# Patient Record
Sex: FEMALE | Race: WHITE | Employment: OTHER | URBAN - METROPOLITAN AREA
[De-identification: names, ages, dates, MRNs, and addresses within clinical notes are randomized per-mention and may not be internally consistent; named-entity substitution may affect disease eponyms.]

---

## 2017-01-24 ENCOUNTER — OFFICE VISIT (OUTPATIENT)
Dept: FAMILY MEDICINE CLINIC | Age: 77
End: 2017-01-24

## 2017-01-24 ENCOUNTER — TELEPHONE (OUTPATIENT)
Dept: FAMILY MEDICINE CLINIC | Age: 77
End: 2017-01-24

## 2017-01-24 ENCOUNTER — HOSPITAL ENCOUNTER (OUTPATIENT)
Dept: GENERAL RADIOLOGY | Age: 77
Discharge: HOME | End: 2017-01-24
Payer: MEDICARE

## 2017-01-24 ENCOUNTER — HOSPITAL ENCOUNTER (OUTPATIENT)
Age: 77
Discharge: HOME | End: 2017-01-24
Payer: MEDICARE

## 2017-01-24 VITALS
RESPIRATION RATE: 18 BRPM | TEMPERATURE: 97.8 F | OXYGEN SATURATION: 94 % | DIASTOLIC BLOOD PRESSURE: 80 MMHG | HEART RATE: 75 BPM | SYSTOLIC BLOOD PRESSURE: 140 MMHG

## 2017-01-24 DIAGNOSIS — S99.911A RIGHT ANKLE INJURY, INITIAL ENCOUNTER: Primary | ICD-10-CM

## 2017-01-24 DIAGNOSIS — S99.911A RIGHT ANKLE INJURY, INITIAL ENCOUNTER: ICD-10-CM
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
## 2017-01-24 PROCEDURE — 1090F PRES/ABSN URINE INCON ASSESS: CPT | Performed by: NURSE PRACTITIONER

## 2017-01-24 PROCEDURE — G8484 FLU IMMUNIZE NO ADMIN: HCPCS | Performed by: NURSE PRACTITIONER

## 2017-01-24 PROCEDURE — 1036F TOBACCO NON-USER: CPT | Performed by: NURSE PRACTITIONER

## 2017-01-24 PROCEDURE — 73610 X-RAY EXAM OF ANKLE: CPT

## 2017-01-24 PROCEDURE — 1123F ACP DISCUSS/DSCN MKR DOCD: CPT | Performed by: NURSE PRACTITIONER

## 2017-01-24 PROCEDURE — 3014F SCREEN MAMMO DOC REV: CPT | Performed by: NURSE PRACTITIONER

## 2017-01-24 PROCEDURE — 4040F PNEUMOC VAC/ADMIN/RCVD: CPT | Performed by: NURSE PRACTITIONER

## 2017-01-24 PROCEDURE — G8419 CALC BMI OUT NRM PARAM NOF/U: HCPCS | Performed by: NURSE PRACTITIONER

## 2017-01-24 PROCEDURE — G8427 DOCREV CUR MEDS BY ELIG CLIN: HCPCS | Performed by: NURSE PRACTITIONER

## 2017-01-24 PROCEDURE — G8400 PT W/DXA NO RESULTS DOC: HCPCS | Performed by: NURSE PRACTITIONER

## 2017-01-24 PROCEDURE — 99213 OFFICE O/P EST LOW 20 MIN: CPT | Performed by: NURSE PRACTITIONER

## 2017-01-24 PROCEDURE — 3017F COLORECTAL CA SCREEN DOC REV: CPT | Performed by: NURSE PRACTITIONER

## 2017-01-24 ASSESSMENT — ENCOUNTER SYMPTOMS
VOMITING: 0
VISUAL CHANGE: 0
NAUSEA: 0
COUGH: 0
BOWEL INCONTINENCE: 0
ABDOMINAL PAIN: 0

## 2017-02-06 ENCOUNTER — HOSPITAL ENCOUNTER (OUTPATIENT)
Dept: LAB | Age: 77
Discharge: HOME | End: 2017-02-06
Payer: MEDICARE

## 2017-02-06 DIAGNOSIS — E78.5 DYSLIPIDEMIA: ICD-10-CM

## 2017-02-06 DIAGNOSIS — I10 ESSENTIAL HYPERTENSION: ICD-10-CM

## 2017-02-06 LAB
ALBUMIN SERPL-MCNC: 4.4 G/DL (ref 3.9–4.9)
ALP BLD-CCNC: 88 U/L (ref 40–130)
ALT SERPL-CCNC: 16 U/L (ref 0–33)
ANION GAP SERPL CALCULATED.3IONS-SCNC: 18 MEQ/L (ref 7–13)
AST SERPL-CCNC: 14 U/L (ref 0–35)
BASOPHILS ABSOLUTE: 0.1 K/UL (ref 0–0.2)
BASOPHILS RELATIVE PERCENT: 0.5 %
BILIRUB SERPL-MCNC: 0.4 MG/DL (ref 0–1.2)
BUN BLDV-MCNC: 16 MG/DL (ref 8–23)
CALCIUM SERPL-MCNC: 9.4 MG/DL (ref 8.6–10.2)
CHLORIDE BLD-SCNC: 105 MEQ/L (ref 98–107)
CHOLESTEROL, TOTAL: 110 MG/DL (ref 0–199)
CO2: 24 MEQ/L (ref 22–29)
CREAT SERPL-MCNC: 0.7 MG/DL (ref 0.5–0.9)
EOSINOPHILS ABSOLUTE: 0.2 K/UL (ref 0–0.7)
EOSINOPHILS RELATIVE PERCENT: 2 %
GFR AFRICAN AMERICAN: >60
GFR NON-AFRICAN AMERICAN: >60
GLOBULIN: 2.4 G/DL (ref 2.3–3.5)
GLUCOSE BLD-MCNC: 93 MG/DL (ref 74–109)
HCT VFR BLD CALC: 43.2 % (ref 37–47)
HDLC SERPL-MCNC: 43 MG/DL (ref 40–59)
HEMOGLOBIN: 14.2 G/DL (ref 12–16)
LDL CHOLESTEROL CALCULATED: 42 MG/DL (ref 0–129)
LYMPHOCYTES ABSOLUTE: 1.7 K/UL (ref 1–4.8)
LYMPHOCYTES RELATIVE PERCENT: 15.8 %
MCH RBC QN AUTO: 30.4 PG (ref 27–31.3)
MCHC RBC AUTO-ENTMCNC: 32.8 % (ref 33–37)
MCV RBC AUTO: 92.6 FL (ref 82–100)
MONOCYTES ABSOLUTE: 0.8 K/UL (ref 0.2–0.8)
MONOCYTES RELATIVE PERCENT: 7.1 %
NEUTROPHILS ABSOLUTE: 8.1 K/UL (ref 1.4–6.5)
NEUTROPHILS RELATIVE PERCENT: 74.6 %
PDW BLD-RTO: 13.8 % (ref 11.5–14.5)
PLATELET # BLD: 240 K/UL (ref 130–400)
POTASSIUM SERPL-SCNC: 3.6 MEQ/L (ref 3.5–5.1)
RBC # BLD: 4.66 M/UL (ref 4.2–5.4)
SODIUM BLD-SCNC: 147 MEQ/L (ref 132–144)
TOTAL PROTEIN: 6.8 G/DL (ref 6.4–8.1)
TRIGL SERPL-MCNC: 127 MG/DL (ref 0–200)
WBC # BLD: 10.9 K/UL (ref 4.8–10.8)

## 2017-02-06 PROCEDURE — 36415 COLL VENOUS BLD VENIPUNCTURE: CPT

## 2017-02-06 PROCEDURE — 80053 COMPREHEN METABOLIC PANEL: CPT

## 2017-02-06 PROCEDURE — 85025 COMPLETE CBC W/AUTO DIFF WBC: CPT

## 2017-02-06 PROCEDURE — 80061 LIPID PANEL: CPT

## 2017-02-07 RX ORDER — BACLOFEN 10 MG/1
TABLET ORAL
Qty: 30 TABLET | Refills: 0 | Status: SHIPPED | OUTPATIENT
Start: 2017-02-07 | End: 2017-03-01 | Stop reason: SDUPTHER

## 2017-02-14 RX ORDER — ROPINIROLE 0.25 MG/1
TABLET, FILM COATED ORAL
Qty: 90 TABLET | Refills: 0 | Status: SHIPPED | OUTPATIENT
Start: 2017-02-14 | End: 2017-04-03 | Stop reason: SDUPTHER

## 2017-02-21 ENCOUNTER — OFFICE VISIT (OUTPATIENT)
Dept: FAMILY MEDICINE CLINIC | Age: 77
End: 2017-02-21

## 2017-02-21 VITALS
SYSTOLIC BLOOD PRESSURE: 138 MMHG | DIASTOLIC BLOOD PRESSURE: 74 MMHG | WEIGHT: 200 LBS | TEMPERATURE: 98.6 F | HEIGHT: 59 IN | BODY MASS INDEX: 40.32 KG/M2 | OXYGEN SATURATION: 93 % | HEART RATE: 66 BPM | RESPIRATION RATE: 18 BRPM

## 2017-02-21 DIAGNOSIS — M19.90 ARTHRITIS: ICD-10-CM

## 2017-02-21 DIAGNOSIS — F41.1 GENERALIZED ANXIETY DISORDER: ICD-10-CM

## 2017-02-21 DIAGNOSIS — Z91.81 AT HIGH RISK FOR FALLS: ICD-10-CM

## 2017-02-21 DIAGNOSIS — Z12.11 SCREENING FOR COLON CANCER: ICD-10-CM

## 2017-02-21 DIAGNOSIS — G25.81 RESTLESS LEG SYNDROME: ICD-10-CM

## 2017-02-21 DIAGNOSIS — M79.7 FIBROMYALGIA: ICD-10-CM

## 2017-02-21 DIAGNOSIS — I10 ESSENTIAL HYPERTENSION: Primary | ICD-10-CM

## 2017-02-21 DIAGNOSIS — R79.81 LOW OXYGEN SATURATION: ICD-10-CM

## 2017-02-21 DIAGNOSIS — E78.5 DYSLIPIDEMIA: ICD-10-CM

## 2017-02-21 DIAGNOSIS — J45.20 MILD INTERMITTENT ASTHMA WITHOUT COMPLICATION: ICD-10-CM

## 2017-02-21 PROCEDURE — G8427 DOCREV CUR MEDS BY ELIG CLIN: HCPCS | Performed by: NURSE PRACTITIONER

## 2017-02-21 PROCEDURE — 99214 OFFICE O/P EST MOD 30 MIN: CPT | Performed by: NURSE PRACTITIONER

## 2017-02-21 PROCEDURE — 82274 ASSAY TEST FOR BLOOD FECAL: CPT | Performed by: NURSE PRACTITIONER

## 2017-02-21 PROCEDURE — G8400 PT W/DXA NO RESULTS DOC: HCPCS | Performed by: NURSE PRACTITIONER

## 2017-02-21 PROCEDURE — 1090F PRES/ABSN URINE INCON ASSESS: CPT | Performed by: NURSE PRACTITIONER

## 2017-02-21 PROCEDURE — 3017F COLORECTAL CA SCREEN DOC REV: CPT | Performed by: NURSE PRACTITIONER

## 2017-02-21 PROCEDURE — G8417 CALC BMI ABV UP PARAM F/U: HCPCS | Performed by: NURSE PRACTITIONER

## 2017-02-21 PROCEDURE — 4040F PNEUMOC VAC/ADMIN/RCVD: CPT | Performed by: NURSE PRACTITIONER

## 2017-02-21 PROCEDURE — 1123F ACP DISCUSS/DSCN MKR DOCD: CPT | Performed by: NURSE PRACTITIONER

## 2017-02-21 PROCEDURE — 1036F TOBACCO NON-USER: CPT | Performed by: NURSE PRACTITIONER

## 2017-02-21 PROCEDURE — G8484 FLU IMMUNIZE NO ADMIN: HCPCS | Performed by: NURSE PRACTITIONER

## 2017-02-21 RX ORDER — TRAMADOL HYDROCHLORIDE 50 MG/1
TABLET ORAL
Qty: 60 TABLET | Refills: 2 | Status: SHIPPED | OUTPATIENT
Start: 2017-02-21 | End: 2017-08-21 | Stop reason: ALTCHOICE

## 2017-02-21 ASSESSMENT — PATIENT HEALTH QUESTIONNAIRE - PHQ9
2. FEELING DOWN, DEPRESSED OR HOPELESS: 0
1. LITTLE INTEREST OR PLEASURE IN DOING THINGS: 0
SUM OF ALL RESPONSES TO PHQ QUESTIONS 1-9: 0
SUM OF ALL RESPONSES TO PHQ9 QUESTIONS 1 & 2: 0

## 2017-02-27 LAB
CONTROL: NEGATIVE
HEMOCCULT STL QL: NEGATIVE

## 2017-03-01 RX ORDER — AMLODIPINE BESYLATE AND ATORVASTATIN CALCIUM 10; 40 MG/1; MG/1
1 TABLET, FILM COATED ORAL DAILY
Qty: 30 TABLET | Refills: 2 | Status: SHIPPED | OUTPATIENT
Start: 2017-03-01 | End: 2017-06-12 | Stop reason: SDUPTHER

## 2017-03-01 RX ORDER — BACLOFEN 10 MG/1
TABLET ORAL
Qty: 30 TABLET | Refills: 0 | Status: SHIPPED | OUTPATIENT
Start: 2017-03-01 | End: 2017-03-27 | Stop reason: SDUPTHER

## 2017-03-27 RX ORDER — BACLOFEN 10 MG/1
TABLET ORAL
Qty: 30 TABLET | Refills: 0 | Status: SHIPPED | OUTPATIENT
Start: 2017-03-27 | End: 2017-05-02 | Stop reason: SDUPTHER

## 2017-04-03 RX ORDER — ROPINIROLE 0.25 MG/1
TABLET, FILM COATED ORAL
Qty: 90 TABLET | Refills: 0 | Status: SHIPPED | OUTPATIENT
Start: 2017-04-03 | End: 2017-05-22 | Stop reason: SDUPTHER

## 2017-05-02 RX ORDER — BACLOFEN 10 MG/1
TABLET ORAL
Qty: 30 TABLET | Refills: 0 | Status: SHIPPED | OUTPATIENT
Start: 2017-05-02 | End: 2017-05-12 | Stop reason: SDUPTHER

## 2017-05-12 RX ORDER — BACLOFEN 10 MG/1
TABLET ORAL
Qty: 30 TABLET | Refills: 0 | Status: SHIPPED | OUTPATIENT
Start: 2017-05-12 | End: 2017-05-28 | Stop reason: SDUPTHER

## 2017-05-22 RX ORDER — ROPINIROLE 0.25 MG/1
TABLET, FILM COATED ORAL
Qty: 90 TABLET | Refills: 0 | Status: SHIPPED | OUTPATIENT
Start: 2017-05-22 | End: 2017-06-26 | Stop reason: SDUPTHER

## 2017-05-28 RX ORDER — BACLOFEN 10 MG/1
TABLET ORAL
Qty: 30 TABLET | Refills: 0 | Status: SHIPPED | OUTPATIENT
Start: 2017-05-28 | End: 2017-05-30 | Stop reason: SDUPTHER

## 2017-05-30 RX ORDER — BACLOFEN 10 MG/1
TABLET ORAL
Qty: 30 TABLET | Refills: 0 | Status: SHIPPED | OUTPATIENT
Start: 2017-05-30 | End: 2017-06-12 | Stop reason: SDUPTHER

## 2017-06-12 RX ORDER — BACLOFEN 10 MG/1
TABLET ORAL
Qty: 30 TABLET | Refills: 0 | Status: SHIPPED | OUTPATIENT
Start: 2017-06-12 | End: 2017-06-22 | Stop reason: SDUPTHER

## 2017-06-12 RX ORDER — AMLODIPINE BESYLATE AND ATORVASTATIN CALCIUM 10; 40 MG/1; MG/1
1 TABLET, FILM COATED ORAL DAILY
Qty: 30 TABLET | Refills: 2 | Status: SHIPPED | OUTPATIENT
Start: 2017-06-12 | End: 2017-07-10 | Stop reason: SDUPTHER

## 2017-06-22 DIAGNOSIS — F41.1 GENERALIZED ANXIETY DISORDER: ICD-10-CM

## 2017-06-22 RX ORDER — BACLOFEN 10 MG/1
TABLET ORAL
Qty: 30 TABLET | Refills: 0 | Status: SHIPPED | OUTPATIENT
Start: 2017-06-22 | End: 2017-07-05 | Stop reason: SDUPTHER

## 2017-06-26 DIAGNOSIS — F41.1 GENERALIZED ANXIETY DISORDER: ICD-10-CM

## 2017-06-26 RX ORDER — ROPINIROLE 0.25 MG/1
TABLET, FILM COATED ORAL
Qty: 90 TABLET | Refills: 0 | Status: SHIPPED | OUTPATIENT
Start: 2017-06-26 | End: 2017-08-08 | Stop reason: SDUPTHER

## 2017-07-05 RX ORDER — BACLOFEN 10 MG/1
TABLET ORAL
Qty: 30 TABLET | Refills: 0 | Status: SHIPPED | OUTPATIENT
Start: 2017-07-05 | End: 2017-07-24 | Stop reason: SDUPTHER

## 2017-07-10 RX ORDER — AMLODIPINE BESYLATE AND ATORVASTATIN CALCIUM 10; 40 MG/1; MG/1
1 TABLET, FILM COATED ORAL DAILY
Qty: 30 TABLET | Refills: 2 | Status: SHIPPED | OUTPATIENT
Start: 2017-07-10 | End: 2017-09-27 | Stop reason: SDUPTHER

## 2017-07-24 RX ORDER — BACLOFEN 10 MG/1
TABLET ORAL
Qty: 30 TABLET | Refills: 0 | Status: SHIPPED | OUTPATIENT
Start: 2017-07-24 | End: 2017-08-08 | Stop reason: SDUPTHER

## 2017-08-08 RX ORDER — BACLOFEN 10 MG/1
TABLET ORAL
Qty: 30 TABLET | Refills: 0 | Status: SHIPPED | OUTPATIENT
Start: 2017-08-08 | End: 2017-09-18 | Stop reason: SDUPTHER

## 2017-08-08 RX ORDER — ROPINIROLE 0.25 MG/1
TABLET, FILM COATED ORAL
Qty: 90 TABLET | Refills: 0 | Status: SHIPPED | OUTPATIENT
Start: 2017-08-08 | End: 2017-09-25 | Stop reason: SDUPTHER

## 2017-08-21 ENCOUNTER — OFFICE VISIT (OUTPATIENT)
Dept: FAMILY MEDICINE CLINIC | Age: 77
End: 2017-08-21

## 2017-08-21 VITALS
TEMPERATURE: 97.1 F | SYSTOLIC BLOOD PRESSURE: 130 MMHG | BODY MASS INDEX: 43.42 KG/M2 | OXYGEN SATURATION: 95 % | HEART RATE: 71 BPM | RESPIRATION RATE: 16 BRPM | WEIGHT: 215.4 LBS | HEIGHT: 59 IN | DIASTOLIC BLOOD PRESSURE: 80 MMHG

## 2017-08-21 DIAGNOSIS — F41.1 GENERALIZED ANXIETY DISORDER: ICD-10-CM

## 2017-08-21 DIAGNOSIS — M79.7 FIBROMYALGIA: ICD-10-CM

## 2017-08-21 DIAGNOSIS — Z91.89 AT HIGH RISK FOR CAREGIVER ROLE STRAIN: ICD-10-CM

## 2017-08-21 DIAGNOSIS — E78.5 DYSLIPIDEMIA: ICD-10-CM

## 2017-08-21 DIAGNOSIS — I10 ESSENTIAL HYPERTENSION: Primary | ICD-10-CM

## 2017-08-21 DIAGNOSIS — G25.81 RESTLESS LEG SYNDROME: ICD-10-CM

## 2017-08-21 DIAGNOSIS — M85.80 OSTEOPENIA, UNSPECIFIED LOCATION: ICD-10-CM

## 2017-08-21 DIAGNOSIS — Z78.0 POST-MENOPAUSAL: ICD-10-CM

## 2017-08-21 PROCEDURE — G8400 PT W/DXA NO RESULTS DOC: HCPCS | Performed by: NURSE PRACTITIONER

## 2017-08-21 PROCEDURE — G8417 CALC BMI ABV UP PARAM F/U: HCPCS | Performed by: NURSE PRACTITIONER

## 2017-08-21 PROCEDURE — 1123F ACP DISCUSS/DSCN MKR DOCD: CPT | Performed by: NURSE PRACTITIONER

## 2017-08-21 PROCEDURE — 3017F COLORECTAL CA SCREEN DOC REV: CPT | Performed by: NURSE PRACTITIONER

## 2017-08-21 PROCEDURE — 4040F PNEUMOC VAC/ADMIN/RCVD: CPT | Performed by: NURSE PRACTITIONER

## 2017-08-21 PROCEDURE — 1036F TOBACCO NON-USER: CPT | Performed by: NURSE PRACTITIONER

## 2017-08-21 PROCEDURE — G8427 DOCREV CUR MEDS BY ELIG CLIN: HCPCS | Performed by: NURSE PRACTITIONER

## 2017-08-21 PROCEDURE — 1090F PRES/ABSN URINE INCON ASSESS: CPT | Performed by: NURSE PRACTITIONER

## 2017-08-21 PROCEDURE — 99214 OFFICE O/P EST MOD 30 MIN: CPT | Performed by: NURSE PRACTITIONER

## 2017-08-21 RX ORDER — BACLOFEN 10 MG/1
TABLET ORAL
Qty: 30 TABLET | Refills: 3 | Status: SHIPPED | OUTPATIENT
Start: 2017-08-21 | End: 2017-09-18 | Stop reason: SDUPTHER

## 2017-08-23 ENCOUNTER — HOSPITAL ENCOUNTER (OUTPATIENT)
Dept: WOMENS IMAGING | Age: 77
Discharge: HOME | End: 2017-08-23
Payer: MEDICARE

## 2017-08-23 DIAGNOSIS — Z78.0 POST-MENOPAUSAL: ICD-10-CM

## 2017-08-23 DIAGNOSIS — M85.80 OSTEOPENIA, UNSPECIFIED LOCATION: ICD-10-CM

## 2017-08-23 PROCEDURE — 77080 DXA BONE DENSITY AXIAL: CPT

## 2017-09-07 ENCOUNTER — OFFICE VISIT (OUTPATIENT)
Dept: FAMILY MEDICINE CLINIC | Age: 77
End: 2017-09-07

## 2017-09-07 VITALS
HEART RATE: 78 BPM | RESPIRATION RATE: 18 BRPM | BODY MASS INDEX: 42.92 KG/M2 | TEMPERATURE: 97.9 F | HEIGHT: 59 IN | DIASTOLIC BLOOD PRESSURE: 80 MMHG | SYSTOLIC BLOOD PRESSURE: 138 MMHG | WEIGHT: 212.9 LBS

## 2017-09-07 DIAGNOSIS — R06.2 WHEEZING: ICD-10-CM

## 2017-09-07 DIAGNOSIS — J06.9 VIRAL URI: Primary | ICD-10-CM

## 2017-09-07 DIAGNOSIS — J45.41 MODERATE PERSISTENT ASTHMA WITH ACUTE EXACERBATION: ICD-10-CM

## 2017-09-07 DIAGNOSIS — J30.1 SEASONAL ALLERGIC RHINITIS DUE TO POLLEN: ICD-10-CM

## 2017-09-07 DIAGNOSIS — R05.9 COUGH: ICD-10-CM

## 2017-09-07 PROCEDURE — 1123F ACP DISCUSS/DSCN MKR DOCD: CPT | Performed by: FAMILY MEDICINE

## 2017-09-07 PROCEDURE — 99214 OFFICE O/P EST MOD 30 MIN: CPT | Performed by: FAMILY MEDICINE

## 2017-09-07 PROCEDURE — G8399 PT W/DXA RESULTS DOCUMENT: HCPCS | Performed by: FAMILY MEDICINE

## 2017-09-07 PROCEDURE — G8427 DOCREV CUR MEDS BY ELIG CLIN: HCPCS | Performed by: FAMILY MEDICINE

## 2017-09-07 PROCEDURE — 1090F PRES/ABSN URINE INCON ASSESS: CPT | Performed by: FAMILY MEDICINE

## 2017-09-07 PROCEDURE — 1036F TOBACCO NON-USER: CPT | Performed by: FAMILY MEDICINE

## 2017-09-07 PROCEDURE — 4040F PNEUMOC VAC/ADMIN/RCVD: CPT | Performed by: FAMILY MEDICINE

## 2017-09-07 PROCEDURE — G8417 CALC BMI ABV UP PARAM F/U: HCPCS | Performed by: FAMILY MEDICINE

## 2017-09-07 PROCEDURE — 3017F COLORECTAL CA SCREEN DOC REV: CPT | Performed by: FAMILY MEDICINE

## 2017-09-07 RX ORDER — PREDNISONE 20 MG/1
40 TABLET ORAL DAILY
Qty: 10 TABLET | Refills: 0 | Status: SHIPPED | OUTPATIENT
Start: 2017-09-07 | End: 2017-09-12

## 2017-09-07 RX ORDER — BENZONATATE 100 MG/1
100 CAPSULE ORAL 3 TIMES DAILY PRN
Qty: 21 CAPSULE | Refills: 0 | Status: SHIPPED | OUTPATIENT
Start: 2017-09-07 | End: 2017-09-14

## 2017-09-07 RX ORDER — FLUTICASONE PROPIONATE 50 MCG
2 SPRAY, SUSPENSION (ML) NASAL DAILY
Qty: 1 BOTTLE | Refills: 3
Start: 2017-09-07 | End: 2020-06-05

## 2017-09-07 RX ORDER — ALBUTEROL SULFATE 90 UG/1
2 AEROSOL, METERED RESPIRATORY (INHALATION) EVERY 6 HOURS PRN
Qty: 1 INHALER | Refills: 3 | Status: SHIPPED | OUTPATIENT
Start: 2017-09-07 | End: 2018-04-27 | Stop reason: SDUPTHER

## 2017-09-07 ASSESSMENT — ENCOUNTER SYMPTOMS
NAUSEA: 0
WHEEZING: 1
SORE THROAT: 0
CHEST TIGHTNESS: 1
RHINORRHEA: 1
SHORTNESS OF BREATH: 0
DIARRHEA: 0
COUGH: 1
TROUBLE SWALLOWING: 0
SINUS PRESSURE: 0
ABDOMINAL PAIN: 0
VOMITING: 0

## 2017-09-15 ENCOUNTER — HOSPITAL ENCOUNTER (OUTPATIENT)
Dept: LAB | Age: 77
Discharge: HOME | End: 2017-09-15
Payer: MEDICARE

## 2017-09-15 DIAGNOSIS — I10 ESSENTIAL HYPERTENSION: ICD-10-CM

## 2017-09-15 DIAGNOSIS — E78.5 DYSLIPIDEMIA: ICD-10-CM

## 2017-09-15 LAB
ALBUMIN SERPL-MCNC: 4.3 G/DL (ref 3.9–4.9)
ALP BLD-CCNC: 67 U/L (ref 40–130)
ALT SERPL-CCNC: 24 U/L (ref 0–33)
ANION GAP SERPL CALCULATED.3IONS-SCNC: 16 MEQ/L (ref 7–13)
AST SERPL-CCNC: 19 U/L (ref 0–35)
BASOPHILS ABSOLUTE: 0.1 K/UL (ref 0–0.2)
BASOPHILS RELATIVE PERCENT: 0.6 %
BILIRUB SERPL-MCNC: 0.5 MG/DL (ref 0–1.2)
BUN BLDV-MCNC: 8 MG/DL (ref 8–23)
CALCIUM SERPL-MCNC: 8.6 MG/DL (ref 8.6–10.2)
CHLORIDE BLD-SCNC: 102 MEQ/L (ref 98–107)
CHOLESTEROL, TOTAL: 109 MG/DL (ref 0–199)
CO2: 26 MEQ/L (ref 22–29)
CREAT SERPL-MCNC: 0.57 MG/DL (ref 0.5–0.9)
EOSINOPHILS ABSOLUTE: 0.2 K/UL (ref 0–0.7)
EOSINOPHILS RELATIVE PERCENT: 2.2 %
GFR AFRICAN AMERICAN: >60
GFR NON-AFRICAN AMERICAN: >60
GLOBULIN: 2.4 G/DL (ref 2.3–3.5)
GLUCOSE BLD-MCNC: 106 MG/DL (ref 74–109)
HCT VFR BLD CALC: 42 % (ref 37–47)
HDLC SERPL-MCNC: 51 MG/DL (ref 40–59)
HEMOGLOBIN: 13.7 G/DL (ref 12–16)
LDL CHOLESTEROL CALCULATED: 34 MG/DL (ref 0–129)
LYMPHOCYTES ABSOLUTE: 2.1 K/UL (ref 1–4.8)
LYMPHOCYTES RELATIVE PERCENT: 21.3 %
MCH RBC QN AUTO: 30.5 PG (ref 27–31.3)
MCHC RBC AUTO-ENTMCNC: 32.5 % (ref 33–37)
MCV RBC AUTO: 93.9 FL (ref 82–100)
MONOCYTES ABSOLUTE: 0.7 K/UL (ref 0.2–0.8)
MONOCYTES RELATIVE PERCENT: 7.3 %
NEUTROPHILS ABSOLUTE: 6.8 K/UL (ref 1.4–6.5)
NEUTROPHILS RELATIVE PERCENT: 68.6 %
PDW BLD-RTO: 14.8 % (ref 11.5–14.5)
PLATELET # BLD: 205 K/UL (ref 130–400)
POTASSIUM SERPL-SCNC: 3.9 MEQ/L (ref 3.5–5.1)
RBC # BLD: 4.47 M/UL (ref 4.2–5.4)
SODIUM BLD-SCNC: 144 MEQ/L (ref 132–144)
TOTAL PROTEIN: 6.7 G/DL (ref 6.4–8.1)
TRIGL SERPL-MCNC: 121 MG/DL (ref 0–200)
WBC # BLD: 10 K/UL (ref 4.8–10.8)

## 2017-09-15 PROCEDURE — 85025 COMPLETE CBC W/AUTO DIFF WBC: CPT

## 2017-09-15 PROCEDURE — 36415 COLL VENOUS BLD VENIPUNCTURE: CPT

## 2017-09-15 PROCEDURE — 80061 LIPID PANEL: CPT

## 2017-09-15 PROCEDURE — 80053 COMPREHEN METABOLIC PANEL: CPT

## 2017-09-18 ENCOUNTER — OFFICE VISIT (OUTPATIENT)
Dept: FAMILY MEDICINE CLINIC | Age: 77
End: 2017-09-18

## 2017-09-18 VITALS
TEMPERATURE: 98.1 F | BODY MASS INDEX: 43.14 KG/M2 | OXYGEN SATURATION: 95 % | DIASTOLIC BLOOD PRESSURE: 60 MMHG | HEART RATE: 70 BPM | WEIGHT: 214 LBS | SYSTOLIC BLOOD PRESSURE: 154 MMHG | RESPIRATION RATE: 16 BRPM | HEIGHT: 59 IN

## 2017-09-18 DIAGNOSIS — Z23 NEED FOR INFLUENZA VACCINATION: ICD-10-CM

## 2017-09-18 DIAGNOSIS — M62.838 MUSCLE SPASM: ICD-10-CM

## 2017-09-18 DIAGNOSIS — J45.20 MILD INTERMITTENT ASTHMA WITHOUT COMPLICATION: ICD-10-CM

## 2017-09-18 DIAGNOSIS — Z91.89 AT HIGH RISK FOR CAREGIVER ROLE STRAIN: ICD-10-CM

## 2017-09-18 DIAGNOSIS — M79.10 MYALGIA: ICD-10-CM

## 2017-09-18 DIAGNOSIS — E78.5 DYSLIPIDEMIA: ICD-10-CM

## 2017-09-18 DIAGNOSIS — R60.0 BILATERAL EDEMA OF LOWER EXTREMITY: ICD-10-CM

## 2017-09-18 DIAGNOSIS — I10 ESSENTIAL HYPERTENSION: Primary | ICD-10-CM

## 2017-09-18 DIAGNOSIS — F41.1 GENERALIZED ANXIETY DISORDER: ICD-10-CM

## 2017-09-18 PROCEDURE — 3017F COLORECTAL CA SCREEN DOC REV: CPT | Performed by: NURSE PRACTITIONER

## 2017-09-18 PROCEDURE — 4040F PNEUMOC VAC/ADMIN/RCVD: CPT | Performed by: NURSE PRACTITIONER

## 2017-09-18 PROCEDURE — 90662 IIV NO PRSV INCREASED AG IM: CPT | Performed by: NURSE PRACTITIONER

## 2017-09-18 PROCEDURE — 1036F TOBACCO NON-USER: CPT | Performed by: NURSE PRACTITIONER

## 2017-09-18 PROCEDURE — G8427 DOCREV CUR MEDS BY ELIG CLIN: HCPCS | Performed by: NURSE PRACTITIONER

## 2017-09-18 PROCEDURE — G8399 PT W/DXA RESULTS DOCUMENT: HCPCS | Performed by: NURSE PRACTITIONER

## 2017-09-18 PROCEDURE — 1090F PRES/ABSN URINE INCON ASSESS: CPT | Performed by: NURSE PRACTITIONER

## 2017-09-18 PROCEDURE — 99214 OFFICE O/P EST MOD 30 MIN: CPT | Performed by: NURSE PRACTITIONER

## 2017-09-18 PROCEDURE — G8417 CALC BMI ABV UP PARAM F/U: HCPCS | Performed by: NURSE PRACTITIONER

## 2017-09-18 PROCEDURE — 1123F ACP DISCUSS/DSCN MKR DOCD: CPT | Performed by: NURSE PRACTITIONER

## 2017-09-18 PROCEDURE — G0008 ADMIN INFLUENZA VIRUS VAC: HCPCS | Performed by: NURSE PRACTITIONER

## 2017-09-18 RX ORDER — BACLOFEN 10 MG/1
TABLET ORAL
Qty: 90 TABLET | Refills: 1 | Status: SHIPPED | OUTPATIENT
Start: 2017-09-18 | End: 2017-12-13 | Stop reason: SDUPTHER

## 2017-09-18 RX ORDER — PREDNISONE 20 MG/1
TABLET ORAL
Qty: 20 TABLET | Refills: 0 | Status: SHIPPED | OUTPATIENT
Start: 2017-09-18 | End: 2017-09-28

## 2017-09-18 RX ORDER — POTASSIUM CHLORIDE 20 MEQ/1
20 TABLET, EXTENDED RELEASE ORAL
Qty: 30 TABLET | Refills: 1 | Status: SHIPPED | OUTPATIENT
Start: 2017-09-18 | End: 2019-08-26 | Stop reason: ALTCHOICE

## 2017-09-18 RX ORDER — FUROSEMIDE 20 MG/1
20 TABLET ORAL
Qty: 30 TABLET | Refills: 0 | Status: SHIPPED | OUTPATIENT
Start: 2017-09-18 | End: 2018-06-22 | Stop reason: SDUPTHER

## 2017-09-18 ASSESSMENT — PATIENT HEALTH QUESTIONNAIRE - PHQ9
SUM OF ALL RESPONSES TO PHQ QUESTIONS 1-9: 0
SUM OF ALL RESPONSES TO PHQ9 QUESTIONS 1 & 2: 0
2. FEELING DOWN, DEPRESSED OR HOPELESS: 0
1. LITTLE INTEREST OR PLEASURE IN DOING THINGS: 0

## 2017-09-25 RX ORDER — ROPINIROLE 0.25 MG/1
TABLET, FILM COATED ORAL
Qty: 90 TABLET | Refills: 3 | Status: SHIPPED | OUTPATIENT
Start: 2017-09-25 | End: 2018-03-01 | Stop reason: SDUPTHER

## 2017-09-28 RX ORDER — AMLODIPINE BESYLATE AND ATORVASTATIN CALCIUM 10; 40 MG/1; MG/1
1 TABLET, FILM COATED ORAL DAILY
Qty: 30 TABLET | Refills: 2 | Status: SHIPPED | OUTPATIENT
Start: 2017-09-28 | End: 2017-11-29 | Stop reason: SDUPTHER

## 2017-10-04 ENCOUNTER — OFFICE VISIT (OUTPATIENT)
Dept: FAMILY MEDICINE CLINIC | Age: 77
End: 2017-10-04

## 2017-10-04 VITALS
DIASTOLIC BLOOD PRESSURE: 80 MMHG | HEART RATE: 71 BPM | SYSTOLIC BLOOD PRESSURE: 150 MMHG | RESPIRATION RATE: 20 BRPM | HEIGHT: 57 IN | TEMPERATURE: 98.2 F | WEIGHT: 217.2 LBS | OXYGEN SATURATION: 90 % | BODY MASS INDEX: 46.86 KG/M2

## 2017-10-04 DIAGNOSIS — J40 BRONCHITIS: Primary | ICD-10-CM

## 2017-10-04 DIAGNOSIS — J01.90 ACUTE SINUSITIS, RECURRENCE NOT SPECIFIED, UNSPECIFIED LOCATION: ICD-10-CM

## 2017-10-04 PROCEDURE — 1090F PRES/ABSN URINE INCON ASSESS: CPT | Performed by: NURSE PRACTITIONER

## 2017-10-04 PROCEDURE — 4040F PNEUMOC VAC/ADMIN/RCVD: CPT | Performed by: NURSE PRACTITIONER

## 2017-10-04 PROCEDURE — 99213 OFFICE O/P EST LOW 20 MIN: CPT | Performed by: NURSE PRACTITIONER

## 2017-10-04 PROCEDURE — 1123F ACP DISCUSS/DSCN MKR DOCD: CPT | Performed by: NURSE PRACTITIONER

## 2017-10-04 PROCEDURE — G8399 PT W/DXA RESULTS DOCUMENT: HCPCS | Performed by: NURSE PRACTITIONER

## 2017-10-04 PROCEDURE — G8417 CALC BMI ABV UP PARAM F/U: HCPCS | Performed by: NURSE PRACTITIONER

## 2017-10-04 PROCEDURE — G8427 DOCREV CUR MEDS BY ELIG CLIN: HCPCS | Performed by: NURSE PRACTITIONER

## 2017-10-04 PROCEDURE — G8484 FLU IMMUNIZE NO ADMIN: HCPCS | Performed by: NURSE PRACTITIONER

## 2017-10-04 PROCEDURE — 1036F TOBACCO NON-USER: CPT | Performed by: NURSE PRACTITIONER

## 2017-10-04 PROCEDURE — 3017F COLORECTAL CA SCREEN DOC REV: CPT | Performed by: NURSE PRACTITIONER

## 2017-10-04 RX ORDER — LEVOFLOXACIN 500 MG/1
500 TABLET, FILM COATED ORAL DAILY
Qty: 10 TABLET | Refills: 0 | Status: SHIPPED | OUTPATIENT
Start: 2017-10-04 | End: 2017-10-14

## 2017-10-04 ASSESSMENT — ENCOUNTER SYMPTOMS
TROUBLE SWALLOWING: 0
SHORTNESS OF BREATH: 1
RHINORRHEA: 1
WHEEZING: 0
NAUSEA: 0
ABDOMINAL PAIN: 0
DIARRHEA: 0
HEMOPTYSIS: 0
SORE THROAT: 1
COUGH: 1
HEARTBURN: 0
VOMITING: 0
SINUS PRESSURE: 1

## 2017-10-04 NOTE — MR AVS SNAPSHOT
After Visit Summary             Bryce Trujillo   10/4/2017 11:30 AM   Office Visit    Description:  Female : 1940   Provider:  Betzaida Gupta NP   Department:  [facility]              Your Follow-Up and Future Appointments         Below is a list of your follow-up and future appointments. This may not be a complete list as you may have made appointments directly with providers that we are not aware of or your providers may have made some for you. Please call your providers to confirm appointments. It is important to keep your appointments. Please bring your current insurance card, photo ID, co-pay, and all medication bottles to your appointment. If self-pay, payment is expected at the time of service. Your To-Do List     Future Appointments Provider Department Dept Phone    10/19/2017 2:40 PM Beata Valdez CNP [facility] [address] [facility] [phone]    Please arrive 15 minutes prior to appointment, bring photo ID and insurance card. 3/19/2018 2:00 PM Marcial Castellon CNP [facility] [address] [facility] [phone]    Please arrive 15 minutes prior to appointment, bring photo ID and insurance card. Follow-Up    Return in about 2 weeks (around 10/18/2017) for followup with Gloria. Information from Your Visit        Department     Name Address Phone Fax    [facility] [address] [phone]      Vital Signs     Blood Pressure Pulse Temperature Respirations Height Weight    150/80 71 98.2 °F (36.8 °C) (Oral) 20 4' 9.2\" (1.453 m) 217 lb 3.2 oz (98.5 kg)    Oxygen Saturation Body Mass Index Smoking Status             90% 46.67 kg/m2 Former Smoker         Additional Information about your Body Mass Index (BMI)           Your BMI as listed above is considered obese (30 or more). BMI is an estimate of body fat, calculated from your height and weight.   The higher your BMI, the greater your risk of heart disease, high blood pressure, type 2 diabetes, stroke, gallstones, arthritis, sleep apnea, and certain cancers. BMI is not perfect. It may overestimate body fat in athletes and people who are more muscular. Even a small weight loss (between 5 and 10 percent of your current weight) by decreasing your calorie intake and becoming more physically active will help lower your risk of developing or worsening diseases associated with obesity. Learn more at: [email or web address]             Today's Medication Changes          These changes are accurate as of: 10/4/17 12:03 PM.  If you have any questions, ask your nurse or doctor. START taking these medications           levofloxacin 500 MG tablet   Commonly known as:  LEVAQUIN   Instructions:   Take 1 tablet by mouth daily for 10 days   Quantity:  10 tablet   Refills:  0   Started by:  Dasha Dominguez NP            Where to Get Your Medications      These medications were sent to [address] [place] [address] Rudi Liu [phone]  [address] [place] [address]     Phone:  [phone]     levofloxacin 500 MG tablet               Your Current Medications Are              levofloxacin (LEVAQUIN) 500 MG tablet Take 1 tablet by mouth daily for 10 days    amLODIPine-atorvastatatin (CADUET) 10-40 MG per tablet Take 1 tablet by mouth daily    rOPINIRole (REQUIP) 0.25 MG tablet TAKE ONE TABLET BY MOUTH THREE TIMES DAILY    baclofen (LIORESAL) 10 MG tablet TAKE ONE TABLET BY MOUTH THREE TIMES DAILY    Co-Enzyme Q-10 100 MG CAPS Take 1 capsule by mouth daily    furosemide (LASIX) 20 MG tablet Take 1 tablet by mouth every 48 hours as needed (fluid retention)    potassium chloride (KLOR-CON M) 20 MEQ extended release tablet Take 1 tablet by mouth every 48 hours as needed (when taking lasix that day)    fluticasone (FLONASE) 50 MCG/ACT nasal spray 2 sprays by Nasal route daily Cetirizine HCl (ZYRTEC ALLERGY) 10 MG CAPS Take 10 mg by mouth daily    albuterol sulfate HFA (VENTOLIN HFA) 108 (90 Base) MCG/ACT inhaler Inhale 2 puffs into the lungs every 6 hours as needed for Wheezing    fluticasone-salmeterol (ADVAIR) 250-50 MCG/DOSE AEPB Inhale 1 puff into the lungs every 12 hours    sertraline (ZOLOFT) 50 MG tablet TAKE ONE TABLET BY MOUTH ONCE DAILY    polyethyl glycol-propyl glycol 0.4-0.3 % (SYSTANE) 0.4-0.3 % ophthalmic solution 1 drop as needed for Dry Eyes      Allergies              Seasonal     Grass, trees, dust, pollen. YEAR ROUND         Additional Information        Basic Information     Date Of Birth Sex Race Ethnicity Preferred Language    1940 Female White Non-/Non  English      Problem List as of 10/4/2017  Date Reviewed: 10/4/2017                At high risk for caregiver role strain    Hypertension    Generalized anxiety disorder    Restless leg syndrome    Arthritis    Fibromyalgia    Dyslipidemia    Osteopenia    Seasonal allergies    Mild intermittent asthma without complication      Immunizations as of 10/4/2017     Name Date    DTaP Vaccine 6/1/2016    Influenza, High Dose 9/18/2017    Pneumococcal 13-valent Conjugate (Alberto Montano) 12/5/2016      Preventive Care        Date Due    Zoster Vaccine 2/21/2018 (Originally 1/29/2002)    Pneumococcal Vaccines (two) for all adults aged 72 and over (2 of 2 - PPSV23) 12/5/2017    Colon Cancer Stool Test 2/27/2018    Cholesterol Screening 9/15/2022    Tetanus Combination Vaccine (2 - Td) 6/1/2026            MyChart Signup           Our records indicate that you have declined MyChart signup.

## 2017-10-04 NOTE — PROGRESS NOTES
albuterol sulfate HFA (VENTOLIN HFA) 108 (90 Base) MCG/ACT inhaler Inhale 2 puffs into the lungs every 6 hours as needed for Wheezing 1 Inhaler 3    fluticasone-salmeterol (ADVAIR) 250-50 MCG/DOSE AEPB Inhale 1 puff into the lungs every 12 hours 60 each 5    sertraline (ZOLOFT) 50 MG tablet TAKE ONE TABLET BY MOUTH ONCE DAILY 30 tablet 5    polyethyl glycol-propyl glycol 0.4-0.3 % (SYSTANE) 0.4-0.3 % ophthalmic solution 1 drop as needed for Dry Eyes       No current facility-administered medications on file prior to visit. History reviewed. No pertinent surgical history. History reviewed. No pertinent family history. Social History     Social History    Marital status:      Spouse name: N/A    Number of children: N/A    Years of education: N/A     Occupational History    Not on file. Social History Main Topics    Smoking status: Former Smoker    Smokeless tobacco: Not on file    Alcohol use Yes    Drug use: No    Sexual activity: Not on file     Other Topics Concern    Not on file     Social History Narrative     Allergies:  Seasonal    Review of Systems   Constitutional: Negative for appetite change, chills, diaphoresis, fatigue, fever and weight loss. HENT: Positive for congestion, postnasal drip, rhinorrhea, sinus pressure and sore throat. Negative for ear pain and trouble swallowing. Eyes: Negative for visual disturbance. Respiratory: Positive for cough and shortness of breath. Negative for hemoptysis and wheezing. Cardiovascular: Negative for chest pain, palpitations and leg swelling. Gastrointestinal: Negative for abdominal pain, diarrhea, heartburn, nausea and vomiting. Genitourinary: Negative for dysuria and hematuria. Skin: Negative for rash. Allergic/Immunologic: Positive for environmental allergies. Neurological: Positive for headaches. Negative for dizziness, syncope and light-headedness.        Objective:   BP (!) 150/80  Pulse 71  Temp 98.2 °F (36.8

## 2017-10-25 ENCOUNTER — HOSPITAL ENCOUNTER (OUTPATIENT)
Age: 77
Discharge: HOME | End: 2017-10-25
Payer: MEDICARE

## 2017-10-25 ENCOUNTER — OFFICE VISIT (OUTPATIENT)
Dept: FAMILY MEDICINE CLINIC | Age: 77
End: 2017-10-25

## 2017-10-25 ENCOUNTER — HOSPITAL ENCOUNTER (OUTPATIENT)
Dept: GENERAL RADIOLOGY | Age: 77
Discharge: HOME | End: 2017-10-25
Payer: MEDICARE

## 2017-10-25 VITALS
DIASTOLIC BLOOD PRESSURE: 60 MMHG | HEART RATE: 63 BPM | TEMPERATURE: 98.6 F | RESPIRATION RATE: 18 BRPM | HEIGHT: 58 IN | WEIGHT: 217 LBS | BODY MASS INDEX: 45.55 KG/M2 | OXYGEN SATURATION: 98 % | SYSTOLIC BLOOD PRESSURE: 132 MMHG

## 2017-10-25 DIAGNOSIS — R05.9 COUGH: ICD-10-CM

## 2017-10-25 DIAGNOSIS — R05.9 COUGH: Primary | ICD-10-CM

## 2017-10-25 DIAGNOSIS — I10 ESSENTIAL HYPERTENSION: ICD-10-CM

## 2017-10-25 PROCEDURE — 99213 OFFICE O/P EST LOW 20 MIN: CPT | Performed by: FAMILY MEDICINE

## 2017-10-25 PROCEDURE — 1036F TOBACCO NON-USER: CPT | Performed by: FAMILY MEDICINE

## 2017-10-25 PROCEDURE — 4040F PNEUMOC VAC/ADMIN/RCVD: CPT | Performed by: FAMILY MEDICINE

## 2017-10-25 PROCEDURE — 1090F PRES/ABSN URINE INCON ASSESS: CPT | Performed by: FAMILY MEDICINE

## 2017-10-25 PROCEDURE — 3017F COLORECTAL CA SCREEN DOC REV: CPT | Performed by: FAMILY MEDICINE

## 2017-10-25 PROCEDURE — 1123F ACP DISCUSS/DSCN MKR DOCD: CPT | Performed by: FAMILY MEDICINE

## 2017-10-25 PROCEDURE — G8484 FLU IMMUNIZE NO ADMIN: HCPCS | Performed by: FAMILY MEDICINE

## 2017-10-25 PROCEDURE — G8417 CALC BMI ABV UP PARAM F/U: HCPCS | Performed by: FAMILY MEDICINE

## 2017-10-25 PROCEDURE — G8427 DOCREV CUR MEDS BY ELIG CLIN: HCPCS | Performed by: FAMILY MEDICINE

## 2017-10-25 PROCEDURE — 71020 XR CHEST STANDARD TWO VW: CPT

## 2017-10-25 PROCEDURE — G8399 PT W/DXA RESULTS DOCUMENT: HCPCS | Performed by: FAMILY MEDICINE

## 2017-10-25 RX ORDER — AMOXICILLIN AND CLAVULANATE POTASSIUM 875; 125 MG/1; MG/1
1 TABLET, FILM COATED ORAL 2 TIMES DAILY
Qty: 14 TABLET | Refills: 0 | Status: SHIPPED | OUTPATIENT
Start: 2017-10-25 | End: 2017-11-01

## 2017-10-25 ASSESSMENT — ENCOUNTER SYMPTOMS
APNEA: 0
ABDOMINAL PAIN: 0
SINUS PAIN: 1
SORE THROAT: 0
BLOOD IN STOOL: 0
CHEST TIGHTNESS: 0
DIARRHEA: 0
CONSTIPATION: 0
VOICE CHANGE: 0
COUGH: 1
SINUS PRESSURE: 1
VOMITING: 0
RHINORRHEA: 1
SHORTNESS OF BREATH: 0
TROUBLE SWALLOWING: 0
NAUSEA: 0

## 2017-10-25 NOTE — PROGRESS NOTES
Subjective:      Patient ID: Mariana Ragland is a 78 y.o. female who presents today for:  Chief Complaint   Patient presents with    Hypertension     patient here today for blood pressure check . pressure in head and ear are fullness and been feeeling bad since nelld passed oct 1 , wants to talk about weight . HPI  Pt is a 78year old female who presents for:  1. Cough: present x 3 weeks. Pt was placed on levaquin in the beginning of the month and has not noticed improvement. Cough is non-productive and occurs throughout the day , but worse at night. Pt denies fever, but admits to sinus congestion and ear fullness   2. HTN: well controlled recently. No chest pain or shortness of breath  Past Medical History:   Diagnosis Date    Allergic rhinitis     Asthma     Fibromyalgia 12/5/2016    Hypertension     Restless leg syndrome 12/5/2016     History reviewed. No pertinent surgical history. Family History   Problem Relation Age of Onset    Allergy (Severe) Paternal Cousin      Social History     Social History    Marital status:      Spouse name: N/A    Number of children: N/A    Years of education: N/A     Occupational History    Not on file.      Social History Main Topics    Smoking status: Former Smoker    Smokeless tobacco: Never Used    Alcohol use Yes    Drug use: No    Sexual activity: Not on file     Other Topics Concern    Not on file     Social History Narrative    No narrative on file     Current Outpatient Prescriptions on File Prior to Visit   Medication Sig Dispense Refill    amLODIPine-atorvastatatin (CADUET) 10-40 MG per tablet Take 1 tablet by mouth daily 30 tablet 2    rOPINIRole (REQUIP) 0.25 MG tablet TAKE ONE TABLET BY MOUTH THREE TIMES DAILY 90 tablet 3    baclofen (LIORESAL) 10 MG tablet TAKE ONE TABLET BY MOUTH THREE TIMES DAILY 90 tablet 1    furosemide (LASIX) 20 MG tablet Take 1 tablet by mouth every 48 hours as needed (fluid retention) 30 tablet 0    potassium chloride (KLOR-CON M) 20 MEQ extended release tablet Take 1 tablet by mouth every 48 hours as needed (when taking lasix that day) 30 tablet 1    fluticasone (FLONASE) 50 MCG/ACT nasal spray 2 sprays by Nasal route daily 1 Bottle 3    Cetirizine HCl (ZYRTEC ALLERGY) 10 MG CAPS Take 10 mg by mouth daily 90 capsule 3    albuterol sulfate HFA (VENTOLIN HFA) 108 (90 Base) MCG/ACT inhaler Inhale 2 puffs into the lungs every 6 hours as needed for Wheezing 1 Inhaler 3    fluticasone-salmeterol (ADVAIR) 250-50 MCG/DOSE AEPB Inhale 1 puff into the lungs every 12 hours 60 each 5    sertraline (ZOLOFT) 50 MG tablet TAKE ONE TABLET BY MOUTH ONCE DAILY 30 tablet 5    polyethyl glycol-propyl glycol 0.4-0.3 % (SYSTANE) 0.4-0.3 % ophthalmic solution 1 drop as needed for Dry Eyes      Co-Enzyme Q-10 100 MG CAPS Take 1 capsule by mouth daily 30 capsule 3     No current facility-administered medications on file prior to visit. Allergies:  Seasonal    Review of Systems   Constitutional: Negative for activity change, appetite change and fatigue. HENT: Positive for congestion, ear pain, postnasal drip, rhinorrhea, sinus pain and sinus pressure. Negative for sneezing, sore throat, tinnitus, trouble swallowing and voice change. Respiratory: Positive for cough (dry cough). Negative for apnea, chest tightness and shortness of breath. Cardiovascular: Negative for chest pain, palpitations and leg swelling. Gastrointestinal: Negative for abdominal pain, blood in stool, constipation, diarrhea, nausea and vomiting. Musculoskeletal: Negative for arthralgias. Neurological: Negative for seizures and headaches. Psychiatric/Behavioral: Negative for hallucinations and suicidal ideas. Objective:   /60 (Site: Right Arm, Position: Sitting, Cuff Size: Large Adult)   Pulse 63   Temp 98.6 °F (37 °C) (Temporal)   Resp 18   Ht 4' 10\" (1.473 m)   Wt 217 lb (98.4 kg)   SpO2 98%   Breastfeeding?  No   BMI

## 2017-11-07 ENCOUNTER — OFFICE VISIT (OUTPATIENT)
Dept: FAMILY MEDICINE CLINIC | Age: 77
End: 2017-11-07

## 2017-11-07 VITALS
RESPIRATION RATE: 14 BRPM | OXYGEN SATURATION: 92 % | DIASTOLIC BLOOD PRESSURE: 62 MMHG | SYSTOLIC BLOOD PRESSURE: 138 MMHG | TEMPERATURE: 97.8 F | WEIGHT: 217 LBS | HEIGHT: 58 IN | BODY MASS INDEX: 45.55 KG/M2 | HEART RATE: 61 BPM

## 2017-11-07 DIAGNOSIS — R63.5 WEIGHT GAIN: ICD-10-CM

## 2017-11-07 DIAGNOSIS — J32.1 CHRONIC FRONTAL SINUSITIS: Primary | ICD-10-CM

## 2017-11-07 PROCEDURE — G8399 PT W/DXA RESULTS DOCUMENT: HCPCS | Performed by: NURSE PRACTITIONER

## 2017-11-07 PROCEDURE — 1090F PRES/ABSN URINE INCON ASSESS: CPT | Performed by: NURSE PRACTITIONER

## 2017-11-07 PROCEDURE — 4040F PNEUMOC VAC/ADMIN/RCVD: CPT | Performed by: NURSE PRACTITIONER

## 2017-11-07 PROCEDURE — G8427 DOCREV CUR MEDS BY ELIG CLIN: HCPCS | Performed by: NURSE PRACTITIONER

## 2017-11-07 PROCEDURE — 1036F TOBACCO NON-USER: CPT | Performed by: NURSE PRACTITIONER

## 2017-11-07 PROCEDURE — G8417 CALC BMI ABV UP PARAM F/U: HCPCS | Performed by: NURSE PRACTITIONER

## 2017-11-07 PROCEDURE — 3017F COLORECTAL CA SCREEN DOC REV: CPT | Performed by: NURSE PRACTITIONER

## 2017-11-07 PROCEDURE — 1123F ACP DISCUSS/DSCN MKR DOCD: CPT | Performed by: NURSE PRACTITIONER

## 2017-11-07 PROCEDURE — G8484 FLU IMMUNIZE NO ADMIN: HCPCS | Performed by: NURSE PRACTITIONER

## 2017-11-07 PROCEDURE — 99214 OFFICE O/P EST MOD 30 MIN: CPT | Performed by: NURSE PRACTITIONER

## 2017-11-07 RX ORDER — PREDNISONE 20 MG/1
TABLET ORAL
Qty: 20 TABLET | Refills: 0 | Status: SHIPPED | OUTPATIENT
Start: 2017-11-07 | End: 2017-11-17

## 2017-11-07 ASSESSMENT — PATIENT HEALTH QUESTIONNAIRE - PHQ9
2. FEELING DOWN, DEPRESSED OR HOPELESS: 1
1. LITTLE INTEREST OR PLEASURE IN DOING THINGS: 1
SUM OF ALL RESPONSES TO PHQ QUESTIONS 1-9: 2
SUM OF ALL RESPONSES TO PHQ9 QUESTIONS 1 & 2: 2

## 2017-11-07 NOTE — PROGRESS NOTES
does not want referral to dietician.        Electronically signed by Shelly Adamson, 5:13 PM 11/7/17

## 2017-11-09 ENCOUNTER — HOSPITAL ENCOUNTER (OUTPATIENT)
Dept: CT IMAGING | Age: 77
Discharge: HOME | End: 2017-11-09
Payer: MEDICARE

## 2017-11-09 DIAGNOSIS — J32.1 CHRONIC FRONTAL SINUSITIS: ICD-10-CM

## 2017-11-09 PROCEDURE — 70486 CT MAXILLOFACIAL W/O DYE: CPT

## 2017-11-10 NOTE — PROGRESS NOTES
Please notify Kwabena Dumont that CT of the sinuses show a few areas of chronic sinusitis. (inflammation of the sinuses). I can refer to ENT if she would like. This is my recommendation based on current symptoms.

## 2017-11-15 DIAGNOSIS — J32.9 CHRONIC SINUSITIS, UNSPECIFIED LOCATION: Primary | ICD-10-CM

## 2017-11-30 RX ORDER — AMLODIPINE BESYLATE AND ATORVASTATIN CALCIUM 10; 40 MG/1; MG/1
TABLET, FILM COATED ORAL
Qty: 90 TABLET | Refills: 1 | Status: SHIPPED | OUTPATIENT
Start: 2017-11-30 | End: 2019-06-24 | Stop reason: ALTCHOICE

## 2017-12-13 DIAGNOSIS — M62.838 MUSCLE SPASM: ICD-10-CM

## 2017-12-14 RX ORDER — BACLOFEN 10 MG/1
TABLET ORAL
Qty: 90 TABLET | Refills: 1 | Status: SHIPPED | OUTPATIENT
Start: 2017-12-14 | End: 2018-03-01 | Stop reason: SDUPTHER

## 2017-12-29 ENCOUNTER — HOSPITAL ENCOUNTER (OUTPATIENT)
Dept: PREADMISSION TESTING | Age: 77
Discharge: HOME | End: 2017-12-29
Payer: MEDICARE

## 2017-12-29 VITALS
HEART RATE: 63 BPM | BODY MASS INDEX: 45.76 KG/M2 | RESPIRATION RATE: 16 BRPM | SYSTOLIC BLOOD PRESSURE: 126 MMHG | WEIGHT: 218 LBS | DIASTOLIC BLOOD PRESSURE: 52 MMHG | HEIGHT: 58 IN | TEMPERATURE: 97.3 F

## 2017-12-29 DIAGNOSIS — J34.3 HYPERTROPHY OF NASAL TURBINATES: ICD-10-CM

## 2017-12-29 DIAGNOSIS — J32.0 CHRONIC MAXILLARY SINUSITIS: ICD-10-CM

## 2017-12-29 DIAGNOSIS — J34.2 DEVIATED SEPTUM: ICD-10-CM

## 2017-12-29 LAB
ANION GAP SERPL CALCULATED.3IONS-SCNC: 15 MEQ/L (ref 7–13)
BUN BLDV-MCNC: 13 MG/DL (ref 8–23)
CALCIUM SERPL-MCNC: 8.7 MG/DL (ref 8.6–10.2)
CHLORIDE BLD-SCNC: 104 MEQ/L (ref 98–107)
CO2: 26 MEQ/L (ref 22–29)
CREAT SERPL-MCNC: 0.68 MG/DL (ref 0.5–0.9)
EKG ATRIAL RATE: 62 BPM
EKG P AXIS: 55 DEGREES
EKG P-R INTERVAL: 172 MS
EKG Q-T INTERVAL: 430 MS
EKG QRS DURATION: 100 MS
EKG QTC CALCULATION (BAZETT): 436 MS
EKG R AXIS: -8 DEGREES
EKG T AXIS: 63 DEGREES
EKG VENTRICULAR RATE: 62 BPM
GFR AFRICAN AMERICAN: >60
GFR NON-AFRICAN AMERICAN: >60
GLUCOSE BLD-MCNC: 117 MG/DL (ref 74–109)
HCT VFR BLD CALC: 41.4 % (ref 37–47)
HEMOGLOBIN: 13.7 G/DL (ref 12–16)
MCH RBC QN AUTO: 30.9 PG (ref 27–31.3)
MCHC RBC AUTO-ENTMCNC: 33 % (ref 33–37)
MCV RBC AUTO: 93.6 FL (ref 82–100)
PDW BLD-RTO: 14.5 % (ref 11.5–14.5)
PLATELET # BLD: 197 K/UL (ref 130–400)
POTASSIUM SERPL-SCNC: 3.8 MEQ/L (ref 3.5–5.1)
RBC # BLD: 4.42 M/UL (ref 4.2–5.4)
SODIUM BLD-SCNC: 145 MEQ/L (ref 132–144)
WBC # BLD: 9 K/UL (ref 4.8–10.8)

## 2017-12-29 PROCEDURE — 93005 ELECTROCARDIOGRAM TRACING: CPT

## 2017-12-29 PROCEDURE — 80048 BASIC METABOLIC PNL TOTAL CA: CPT

## 2017-12-29 PROCEDURE — 85027 COMPLETE CBC AUTOMATED: CPT

## 2017-12-29 RX ORDER — SODIUM CHLORIDE 0.9 % (FLUSH) 0.9 %
10 SYRINGE (ML) INJECTION PRN
Status: CANCELLED | OUTPATIENT
Start: 2017-12-29

## 2017-12-29 RX ORDER — LIDOCAINE HYDROCHLORIDE 10 MG/ML
1 INJECTION, SOLUTION EPIDURAL; INFILTRATION; INTRACAUDAL; PERINEURAL
Status: CANCELLED | OUTPATIENT
Start: 2017-12-29 | End: 2017-12-29

## 2017-12-29 RX ORDER — SODIUM CHLORIDE, SODIUM LACTATE, POTASSIUM CHLORIDE, CALCIUM CHLORIDE 600; 310; 30; 20 MG/100ML; MG/100ML; MG/100ML; MG/100ML
INJECTION, SOLUTION INTRAVENOUS CONTINUOUS
Status: CANCELLED | OUTPATIENT
Start: 2017-12-29

## 2017-12-29 RX ORDER — SODIUM CHLORIDE 0.9 % (FLUSH) 0.9 %
10 SYRINGE (ML) INJECTION EVERY 12 HOURS SCHEDULED
Status: CANCELLED | OUTPATIENT
Start: 2017-12-29

## 2017-12-29 ASSESSMENT — ENCOUNTER SYMPTOMS
BLURRED VISION: 0
STRIDOR: 0
HEARTBURN: 0
ABDOMINAL PAIN: 0
EYES NEGATIVE: 1
SINUS PAIN: 0
NAUSEA: 0
DOUBLE VISION: 0
COUGH: 0
VOMITING: 0
SORE THROAT: 0
EYE REDNESS: 0
DIARRHEA: 0
CONSTIPATION: 0

## 2017-12-29 NOTE — H&P
Alicia Cid is an 78 y.o.  female. Sinusitis began years ago, but has progressively become worse the last two months. The patient has used medications to help with symptoms but nothing has helped. Dr. Bina Rivas recommends surgery. Past Medical History:   Diagnosis Date    Allergic rhinitis     Arthritis     Asthma     Chronic sinusitis     Class 3 obesity with body mass index (BMI) of 40.0 to 44.9 in adult [facility]) 11/7/2017    Fibromyalgia 12/5/2016    Hyperlipidemia     Hypertension     Restless leg syndrome 12/5/2016       Allergies: Allergies   Allergen Reactions    Seasonal      Grass, trees, dust, pollen. YEAR ROUND       Active Problems:    Chronic maxillary sinusitis    Blood pressure (!) 126/52, pulse 63, temperature 97.3 °F (36.3 °C), temperature source Temporal, resp. rate 16, height 4' 10\" (1.473 m), weight 218 lb (98.9 kg), not currently breastfeeding. Review of Systems   Constitutional: Negative. Negative for chills and fever. HENT: Positive for congestion. Negative for ear pain, sinus pain, sore throat and tinnitus. Bilateral ear fullness hearing deficit. Eyes: Negative. Negative for blurred vision, double vision and redness. Respiratory: Negative for cough and stridor. Patient experiences asthma symptoms of wheezing and shortness of breath. Patient uses inhalers as prescribed. Cardiovascular: Positive for leg swelling (trace leg edema). Negative for chest pain and palpitations. Gastrointestinal: Negative for abdominal pain, constipation, diarrhea, heartburn, nausea and vomiting. Genitourinary: Negative for dysuria, frequency and urgency. Musculoskeletal: Negative for falls. Generalized pain from arthritis. Skin: Negative. Negative for itching and rash. Neurological: Negative. Negative for dizziness, tingling, tremors, sensory change, seizures, weakness and headaches. Endo/Heme/Allergies: Does not bruise/bleed easily.

## 2018-01-02 PROCEDURE — 93010 ELECTROCARDIOGRAM REPORT: CPT | Performed by: INTERNAL MEDICINE

## 2018-01-03 ENCOUNTER — ANESTHESIA EVENT (OUTPATIENT)
Dept: OPERATING ROOM | Age: 78
End: 2018-01-03
Payer: MEDICARE

## 2018-01-04 ENCOUNTER — HOSPITAL ENCOUNTER (OUTPATIENT)
Dept: GENERAL RADIOLOGY | Age: 78
Setting detail: OUTPATIENT SURGERY
Discharge: HOME | End: 2018-01-04
Attending: OTOLARYNGOLOGY
Payer: MEDICARE

## 2018-01-04 ENCOUNTER — ANESTHESIA (OUTPATIENT)
Dept: OPERATING ROOM | Age: 78
End: 2018-01-04
Payer: MEDICARE

## 2018-01-04 ENCOUNTER — HOSPITAL ENCOUNTER (OUTPATIENT)
Age: 78
Setting detail: OUTPATIENT SURGERY
Discharge: HOME | End: 2018-01-04
Attending: OTOLARYNGOLOGY | Admitting: OTOLARYNGOLOGY
Payer: MEDICARE

## 2018-01-04 VITALS
HEART RATE: 70 BPM | OXYGEN SATURATION: 96 % | WEIGHT: 218 LBS | BODY MASS INDEX: 45.76 KG/M2 | HEIGHT: 58 IN | SYSTOLIC BLOOD PRESSURE: 139 MMHG | RESPIRATION RATE: 16 BRPM | TEMPERATURE: 97.5 F | DIASTOLIC BLOOD PRESSURE: 68 MMHG

## 2018-01-04 VITALS — OXYGEN SATURATION: 100 % | SYSTOLIC BLOOD PRESSURE: 146 MMHG | TEMPERATURE: 93.4 F | DIASTOLIC BLOOD PRESSURE: 77 MMHG

## 2018-01-04 DIAGNOSIS — R52 PAIN: ICD-10-CM

## 2018-01-04 DIAGNOSIS — J32.0 CHRONIC MAXILLARY SINUSITIS: Primary | ICD-10-CM

## 2018-01-04 DIAGNOSIS — J34.2 DEVIATED SEPTUM: ICD-10-CM

## 2018-01-04 PROCEDURE — 3600000014 HC SURGERY LEVEL 4 ADDTL 15MIN: Performed by: OTOLARYNGOLOGY

## 2018-01-04 PROCEDURE — 3209999900 FLUORO FOR SURGICAL PROCEDURES

## 2018-01-04 PROCEDURE — 2580000003 HC RX 258: Performed by: OTOLARYNGOLOGY

## 2018-01-04 PROCEDURE — 6370000000 HC RX 637 (ALT 250 FOR IP): Performed by: OTOLARYNGOLOGY

## 2018-01-04 PROCEDURE — 6360000002 HC RX W HCPCS: Performed by: OTOLARYNGOLOGY

## 2018-01-04 PROCEDURE — 3600000004 HC SURGERY LEVEL 4 BASE: Performed by: OTOLARYNGOLOGY

## 2018-01-04 PROCEDURE — 7100000010 HC PHASE II RECOVERY - FIRST 15 MIN: Performed by: OTOLARYNGOLOGY

## 2018-01-04 PROCEDURE — 6360000002 HC RX W HCPCS: Performed by: ANESTHESIOLOGY

## 2018-01-04 PROCEDURE — C1887 CATHETER, GUIDING: HCPCS | Performed by: OTOLARYNGOLOGY

## 2018-01-04 PROCEDURE — 6360000004 HC RX CONTRAST MEDICATION: Performed by: OTOLARYNGOLOGY

## 2018-01-04 PROCEDURE — 2720000010 HC SURG SUPPLY STERILE: Performed by: OTOLARYNGOLOGY

## 2018-01-04 PROCEDURE — 3700000000 HC ANESTHESIA ATTENDED CARE: Performed by: OTOLARYNGOLOGY

## 2018-01-04 PROCEDURE — 2500000003 HC RX 250 WO HCPCS: Performed by: OTOLARYNGOLOGY

## 2018-01-04 PROCEDURE — 88304 TISSUE EXAM BY PATHOLOGIST: CPT

## 2018-01-04 PROCEDURE — 7100000001 HC PACU RECOVERY - ADDTL 15 MIN: Performed by: OTOLARYNGOLOGY

## 2018-01-04 PROCEDURE — C1729 CATH, DRAINAGE: HCPCS | Performed by: OTOLARYNGOLOGY

## 2018-01-04 PROCEDURE — 2500000003 HC RX 250 WO HCPCS: Performed by: STUDENT IN AN ORGANIZED HEALTH CARE EDUCATION/TRAINING PROGRAM

## 2018-01-04 PROCEDURE — 6360000002 HC RX W HCPCS: Performed by: NURSE ANESTHETIST, CERTIFIED REGISTERED

## 2018-01-04 PROCEDURE — 7100000000 HC PACU RECOVERY - FIRST 15 MIN: Performed by: OTOLARYNGOLOGY

## 2018-01-04 PROCEDURE — 3700000001 HC ADD 15 MINUTES (ANESTHESIA): Performed by: OTOLARYNGOLOGY

## 2018-01-04 PROCEDURE — 2580000003 HC RX 258: Performed by: STUDENT IN AN ORGANIZED HEALTH CARE EDUCATION/TRAINING PROGRAM

## 2018-01-04 PROCEDURE — 7100000011 HC PHASE II RECOVERY - ADDTL 15 MIN: Performed by: OTOLARYNGOLOGY

## 2018-01-04 PROCEDURE — 2500000003 HC RX 250 WO HCPCS: Performed by: NURSE ANESTHETIST, CERTIFIED REGISTERED

## 2018-01-04 RX ORDER — FENTANYL CITRATE 50 UG/ML
INJECTION, SOLUTION INTRAMUSCULAR; INTRAVENOUS PRN
Status: DISCONTINUED | OUTPATIENT
Start: 2018-01-04 | End: 2018-01-04 | Stop reason: SDUPTHER

## 2018-01-04 RX ORDER — ONDANSETRON 2 MG/ML
INJECTION INTRAMUSCULAR; INTRAVENOUS PRN
Status: DISCONTINUED | OUTPATIENT
Start: 2018-01-04 | End: 2018-01-04 | Stop reason: SDUPTHER

## 2018-01-04 RX ORDER — ACETAMINOPHEN 325 MG/1
650 TABLET ORAL EVERY 4 HOURS PRN
Status: CANCELLED | OUTPATIENT
Start: 2018-01-04

## 2018-01-04 RX ORDER — DEXAMETHASONE SODIUM PHOSPHATE 4 MG/ML
INJECTION, SOLUTION INTRA-ARTICULAR; INTRALESIONAL; INTRAMUSCULAR; INTRAVENOUS; SOFT TISSUE PRN
Status: DISCONTINUED | OUTPATIENT
Start: 2018-01-04 | End: 2018-01-04 | Stop reason: SDUPTHER

## 2018-01-04 RX ORDER — GLYCOPYRROLATE 0.2 MG/ML
INJECTION INTRAMUSCULAR; INTRAVENOUS PRN
Status: DISCONTINUED | OUTPATIENT
Start: 2018-01-04 | End: 2018-01-04 | Stop reason: SDUPTHER

## 2018-01-04 RX ORDER — ROCURONIUM BROMIDE 10 MG/ML
INJECTION, SOLUTION INTRAVENOUS PRN
Status: DISCONTINUED | OUTPATIENT
Start: 2018-01-04 | End: 2018-01-04 | Stop reason: SDUPTHER

## 2018-01-04 RX ORDER — METOCLOPRAMIDE HYDROCHLORIDE 5 MG/ML
10 INJECTION INTRAMUSCULAR; INTRAVENOUS
Status: DISCONTINUED | OUTPATIENT
Start: 2018-01-04 | End: 2018-01-04 | Stop reason: HOSPADM

## 2018-01-04 RX ORDER — WOUND DRESSING ADHESIVE - LIQUID
LIQUID MISCELLANEOUS PRN
Status: DISCONTINUED | OUTPATIENT
Start: 2018-01-04 | End: 2018-01-04 | Stop reason: HOSPADM

## 2018-01-04 RX ORDER — FENTANYL CITRATE 50 UG/ML
50 INJECTION, SOLUTION INTRAMUSCULAR; INTRAVENOUS EVERY 10 MIN PRN
Status: DISCONTINUED | OUTPATIENT
Start: 2018-01-04 | End: 2018-01-04 | Stop reason: HOSPADM

## 2018-01-04 RX ORDER — CHLORHEXIDINE GLUCONATE 4 G/100ML
SOLUTION TOPICAL PRN
Status: DISCONTINUED | OUTPATIENT
Start: 2018-01-04 | End: 2018-01-04 | Stop reason: HOSPADM

## 2018-01-04 RX ORDER — LIDOCAINE HYDROCHLORIDE 10 MG/ML
1 INJECTION, SOLUTION EPIDURAL; INFILTRATION; INTRACAUDAL; PERINEURAL
Status: COMPLETED | OUTPATIENT
Start: 2018-01-04 | End: 2018-01-04

## 2018-01-04 RX ORDER — HYDROCODONE BITARTRATE AND ACETAMINOPHEN 5; 325 MG/1; MG/1
1 TABLET ORAL EVERY 4 HOURS PRN
Qty: 20 TABLET | Refills: 0 | Status: SHIPPED | OUTPATIENT
Start: 2018-01-04 | End: 2018-01-11

## 2018-01-04 RX ORDER — ONDANSETRON 2 MG/ML
4 INJECTION INTRAMUSCULAR; INTRAVENOUS EVERY 6 HOURS PRN
Status: CANCELLED | OUTPATIENT
Start: 2018-01-04

## 2018-01-04 RX ORDER — SODIUM CHLORIDE 0.9 % (FLUSH) 0.9 %
10 SYRINGE (ML) INJECTION EVERY 12 HOURS SCHEDULED
Status: DISCONTINUED | OUTPATIENT
Start: 2018-01-04 | End: 2018-01-04 | Stop reason: HOSPADM

## 2018-01-04 RX ORDER — HYDROCODONE BITARTRATE AND ACETAMINOPHEN 5; 325 MG/1; MG/1
1 TABLET ORAL PRN
Status: DISCONTINUED | OUTPATIENT
Start: 2018-01-04 | End: 2018-01-04 | Stop reason: HOSPADM

## 2018-01-04 RX ORDER — ONDANSETRON 2 MG/ML
4 INJECTION INTRAMUSCULAR; INTRAVENOUS
Status: DISCONTINUED | OUTPATIENT
Start: 2018-01-04 | End: 2018-01-04 | Stop reason: HOSPADM

## 2018-01-04 RX ORDER — LIDOCAINE HYDROCHLORIDE 20 MG/ML
INJECTION, SOLUTION INFILTRATION; PERINEURAL PRN
Status: DISCONTINUED | OUTPATIENT
Start: 2018-01-04 | End: 2018-01-04 | Stop reason: SDUPTHER

## 2018-01-04 RX ORDER — SODIUM CHLORIDE, SODIUM LACTATE, POTASSIUM CHLORIDE, CALCIUM CHLORIDE 600; 310; 30; 20 MG/100ML; MG/100ML; MG/100ML; MG/100ML
INJECTION, SOLUTION INTRAVENOUS CONTINUOUS
Status: DISCONTINUED | OUTPATIENT
Start: 2018-01-04 | End: 2018-01-04 | Stop reason: HOSPADM

## 2018-01-04 RX ORDER — MEPERIDINE HYDROCHLORIDE 25 MG/ML
12.5 INJECTION INTRAMUSCULAR; INTRAVENOUS; SUBCUTANEOUS EVERY 5 MIN PRN
Status: DISCONTINUED | OUTPATIENT
Start: 2018-01-04 | End: 2018-01-04 | Stop reason: HOSPADM

## 2018-01-04 RX ORDER — CEPHALEXIN 250 MG/1
250 CAPSULE ORAL 3 TIMES DAILY
Qty: 30 CAPSULE | Refills: 0 | Status: SHIPPED | OUTPATIENT
Start: 2018-01-04 | End: 2018-01-14

## 2018-01-04 RX ORDER — MAGNESIUM HYDROXIDE 1200 MG/15ML
LIQUID ORAL CONTINUOUS PRN
Status: DISCONTINUED | OUTPATIENT
Start: 2018-01-04 | End: 2018-01-04 | Stop reason: HOSPADM

## 2018-01-04 RX ORDER — HYDROCODONE BITARTRATE AND ACETAMINOPHEN 5; 325 MG/1; MG/1
2 TABLET ORAL PRN
Status: DISCONTINUED | OUTPATIENT
Start: 2018-01-04 | End: 2018-01-04 | Stop reason: HOSPADM

## 2018-01-04 RX ORDER — SODIUM CHLORIDE 0.9 % (FLUSH) 0.9 %
10 SYRINGE (ML) INJECTION EVERY 12 HOURS SCHEDULED
Status: CANCELLED | OUTPATIENT
Start: 2018-01-04

## 2018-01-04 RX ORDER — SODIUM CHLORIDE 0.9 % (FLUSH) 0.9 %
10 SYRINGE (ML) INJECTION PRN
Status: CANCELLED | OUTPATIENT
Start: 2018-01-04

## 2018-01-04 RX ORDER — PROPOFOL 10 MG/ML
INJECTION, EMULSION INTRAVENOUS PRN
Status: DISCONTINUED | OUTPATIENT
Start: 2018-01-04 | End: 2018-01-04 | Stop reason: SDUPTHER

## 2018-01-04 RX ORDER — DIPHENHYDRAMINE HYDROCHLORIDE 50 MG/ML
12.5 INJECTION INTRAMUSCULAR; INTRAVENOUS
Status: DISCONTINUED | OUTPATIENT
Start: 2018-01-04 | End: 2018-01-04 | Stop reason: HOSPADM

## 2018-01-04 RX ORDER — GINSENG 100 MG
CAPSULE ORAL PRN
Status: DISCONTINUED | OUTPATIENT
Start: 2018-01-04 | End: 2018-01-04 | Stop reason: HOSPADM

## 2018-01-04 RX ORDER — SODIUM CHLORIDE 0.9 % (FLUSH) 0.9 %
10 SYRINGE (ML) INJECTION PRN
Status: DISCONTINUED | OUTPATIENT
Start: 2018-01-04 | End: 2018-01-04 | Stop reason: HOSPADM

## 2018-01-04 RX ADMIN — FENTANYL CITRATE 50 MCG: 50 INJECTION, SOLUTION INTRAMUSCULAR; INTRAVENOUS at 10:12

## 2018-01-04 RX ADMIN — CEFAZOLIN SODIUM 2 G: 2 SOLUTION INTRAVENOUS at 07:30

## 2018-01-04 RX ADMIN — GLYCOPYRROLATE 0.2 MG: 0.2 INJECTION INTRAMUSCULAR; INTRAVENOUS at 07:47

## 2018-01-04 RX ADMIN — DEXAMETHASONE SODIUM PHOSPHATE 8 MG: 4 INJECTION INTRA-ARTICULAR; INTRALESIONAL; INTRAMUSCULAR; INTRAVENOUS; SOFT TISSUE at 07:47

## 2018-01-04 RX ADMIN — ROCURONIUM BROMIDE 50 MG: 10 INJECTION INTRAVENOUS at 07:38

## 2018-01-04 RX ADMIN — FENTANYL CITRATE 25 MCG: 50 INJECTION, SOLUTION INTRAMUSCULAR; INTRAVENOUS at 09:25

## 2018-01-04 RX ADMIN — PROPOFOL 150 MG: 10 INJECTION, EMULSION INTRAVENOUS at 07:37

## 2018-01-04 RX ADMIN — SODIUM CHLORIDE, POTASSIUM CHLORIDE, SODIUM LACTATE AND CALCIUM CHLORIDE: 600; 310; 30; 20 INJECTION, SOLUTION INTRAVENOUS at 06:42

## 2018-01-04 RX ADMIN — Medication 0.1 MG: at 08:35

## 2018-01-04 RX ADMIN — FENTANYL CITRATE 25 MCG: 50 INJECTION, SOLUTION INTRAMUSCULAR; INTRAVENOUS at 09:28

## 2018-01-04 RX ADMIN — SUGAMMADEX 198 MG: 100 INJECTION, SOLUTION INTRAVENOUS at 09:15

## 2018-01-04 RX ADMIN — LIDOCAINE HYDROCHLORIDE 80 MG: 20 INJECTION, SOLUTION INFILTRATION; PERINEURAL at 07:37

## 2018-01-04 RX ADMIN — FENTANYL CITRATE 50 MCG: 50 INJECTION, SOLUTION INTRAMUSCULAR; INTRAVENOUS at 07:37

## 2018-01-04 RX ADMIN — SODIUM CHLORIDE, POTASSIUM CHLORIDE, SODIUM LACTATE AND CALCIUM CHLORIDE: 600; 310; 30; 20 INJECTION, SOLUTION INTRAVENOUS at 08:40

## 2018-01-04 RX ADMIN — LIDOCAINE HYDROCHLORIDE 1 ML: 10 INJECTION, SOLUTION EPIDURAL; INFILTRATION; INTRACAUDAL; PERINEURAL at 06:42

## 2018-01-04 RX ADMIN — ONDANSETRON 4 MG: 2 INJECTION INTRAMUSCULAR; INTRAVENOUS at 09:10

## 2018-01-04 ASSESSMENT — PAIN DESCRIPTION - PAIN TYPE
TYPE: SURGICAL PAIN

## 2018-01-04 ASSESSMENT — PULMONARY FUNCTION TESTS
PIF_VALUE: 0
PIF_VALUE: 26
PIF_VALUE: 26
PIF_VALUE: 17
PIF_VALUE: 13
PIF_VALUE: 28
PIF_VALUE: 29
PIF_VALUE: 29
PIF_VALUE: 26
PIF_VALUE: 0
PIF_VALUE: 5
PIF_VALUE: 31
PIF_VALUE: 27
PIF_VALUE: 32
PIF_VALUE: 18
PIF_VALUE: 26
PIF_VALUE: 14
PIF_VALUE: 26
PIF_VALUE: 17
PIF_VALUE: 1
PIF_VALUE: 26
PIF_VALUE: 29
PIF_VALUE: 26
PIF_VALUE: 28
PIF_VALUE: 32
PIF_VALUE: 26
PIF_VALUE: 28
PIF_VALUE: 26
PIF_VALUE: 28
PIF_VALUE: 28
PIF_VALUE: 14
PIF_VALUE: 0
PIF_VALUE: 28
PIF_VALUE: 29
PIF_VALUE: 31
PIF_VALUE: 26
PIF_VALUE: 26
PIF_VALUE: 28
PIF_VALUE: 30
PIF_VALUE: 26
PIF_VALUE: 2
PIF_VALUE: 28
PIF_VALUE: 31
PIF_VALUE: 29
PIF_VALUE: 26
PIF_VALUE: 13
PIF_VALUE: 31
PIF_VALUE: 29
PIF_VALUE: 28
PIF_VALUE: 27
PIF_VALUE: 26
PIF_VALUE: 29
PIF_VALUE: 0
PIF_VALUE: 30
PIF_VALUE: 29
PIF_VALUE: 33
PIF_VALUE: 29
PIF_VALUE: 26
PIF_VALUE: 28
PIF_VALUE: 26
PIF_VALUE: 30
PIF_VALUE: 29
PIF_VALUE: 32
PIF_VALUE: 27
PIF_VALUE: 30
PIF_VALUE: 28
PIF_VALUE: 29
PIF_VALUE: 26
PIF_VALUE: 28
PIF_VALUE: 30
PIF_VALUE: 26
PIF_VALUE: 1
PIF_VALUE: 29
PIF_VALUE: 1
PIF_VALUE: 26
PIF_VALUE: 26
PIF_VALUE: 31
PIF_VALUE: 26
PIF_VALUE: 17
PIF_VALUE: 28
PIF_VALUE: 28
PIF_VALUE: 31
PIF_VALUE: 32
PIF_VALUE: 31
PIF_VALUE: 31
PIF_VALUE: 26
PIF_VALUE: 29
PIF_VALUE: 6
PIF_VALUE: 29
PIF_VALUE: 1
PIF_VALUE: 28
PIF_VALUE: 26
PIF_VALUE: 1
PIF_VALUE: 26
PIF_VALUE: 0
PIF_VALUE: 26
PIF_VALUE: 27
PIF_VALUE: 28
PIF_VALUE: 0
PIF_VALUE: 0
PIF_VALUE: 28
PIF_VALUE: 28
PIF_VALUE: 32
PIF_VALUE: 0
PIF_VALUE: 26
PIF_VALUE: 17
PIF_VALUE: 26
PIF_VALUE: 28

## 2018-01-04 ASSESSMENT — PAIN SCALES - GENERAL
PAINLEVEL_OUTOF10: 3
PAINLEVEL_OUTOF10: 2
PAINLEVEL_OUTOF10: 5
PAINLEVEL_OUTOF10: 5
PAINLEVEL_OUTOF10: 2
PAINLEVEL_OUTOF10: 3
PAINLEVEL_OUTOF10: 3

## 2018-01-04 ASSESSMENT — PAIN DESCRIPTION - LOCATION
LOCATION: NOSE

## 2018-01-04 ASSESSMENT — PAIN - FUNCTIONAL ASSESSMENT: PAIN_FUNCTIONAL_ASSESSMENT: 0-10

## 2018-01-04 NOTE — PROGRESS NOTES
Nasal drip pad changed for moderate red drainage. No active bleeding at present. Getting dresses with assist from daughter. Stable and comfortable. Patient declined offer for pain medicine  Lungs clear, heart rate regular. Color good, skin warm and dry.

## 2018-01-04 NOTE — H&P (VIEW-ONLY)
Ayana Yung is an 78 y.o.  female. Sinusitis began years ago, but has progressively become worse the last two months. The patient has used medications to help with symptoms but nothing has helped. Dr. Karrie Martinez recommends surgery. Past Medical History:   Diagnosis Date    Allergic rhinitis     Arthritis     Asthma     Chronic sinusitis     Class 3 obesity with body mass index (BMI) of 40.0 to 44.9 in adult [facility]) 11/7/2017    Fibromyalgia 12/5/2016    Hyperlipidemia     Hypertension     Restless leg syndrome 12/5/2016       Allergies: Allergies   Allergen Reactions    Seasonal      Grass, trees, dust, pollen. YEAR ROUND       Active Problems:    Chronic maxillary sinusitis    Blood pressure (!) 126/52, pulse 63, temperature 97.3 °F (36.3 °C), temperature source Temporal, resp. rate 16, height 4' 10\" (1.473 m), weight 218 lb (98.9 kg), not currently breastfeeding. Review of Systems   Constitutional: Negative. Negative for chills and fever. HENT: Positive for congestion. Negative for ear pain, sinus pain, sore throat and tinnitus. Bilateral ear fullness hearing deficit. Eyes: Negative. Negative for blurred vision, double vision and redness. Respiratory: Negative for cough and stridor. Patient experiences asthma symptoms of wheezing and shortness of breath. Patient uses inhalers as prescribed. Cardiovascular: Positive for leg swelling (trace leg edema). Negative for chest pain and palpitations. Gastrointestinal: Negative for abdominal pain, constipation, diarrhea, heartburn, nausea and vomiting. Genitourinary: Negative for dysuria, frequency and urgency. Musculoskeletal: Negative for falls. Generalized pain from arthritis. Skin: Negative. Negative for itching and rash. Neurological: Negative. Negative for dizziness, tingling, tremors, sensory change, seizures, weakness and headaches. Endo/Heme/Allergies: Does not bruise/bleed easily. Psychiatric/Behavioral: Positive for depression (recent death of spouse). Negative for memory loss and suicidal ideas. The patient is not nervous/anxious and does not have insomnia. Physical Exam   Constitutional: She is oriented to person, place, and time. She appears well-developed and well-nourished. HENT:   Head: Normocephalic and atraumatic. Right Ear: External ear normal.   Left Ear: External ear normal.   Mouth/Throat: Oropharynx is clear and moist. No oropharyngeal exudate. Eyes: Conjunctivae and EOM are normal. Pupils are equal, round, and reactive to light. Neck: Normal range of motion. No JVD present. No tracheal deviation present. No thyromegaly present. Cardiovascular: Normal rate, regular rhythm and normal heart sounds. Exam reveals no gallop. No murmur heard. Pulmonary/Chest: Effort normal. No respiratory distress. She has no wheezes. She exhibits no tenderness. Abdominal: Soft. Bowel sounds are normal. There is no tenderness. obese   Genitourinary:   Genitourinary Comments: Deferred   Musculoskeletal: Normal range of motion. She exhibits no edema, tenderness or deformity. Lymphadenopathy:     She has no cervical adenopathy. Neurological: She is alert and oriented to person, place, and time. No cranial nerve deficit. Coordination normal.   Skin: Skin is warm and dry. No rash noted. No erythema. No pallor. Psychiatric: She has a normal mood and affect.        Assessment:  H/O Chronic Sinusitis    Plan:  SEPTOPLASTY MICRODEBRIDER ASSISTED TURBINOPALSTY AND OUT-FRACTURING BILATERAL BALLOON SINUPLASTY OF MAXILLARY AND SPENOID SINUSES    Manohar Porter NP and Philippe Crouch CNP  12/29/2017

## 2018-01-04 NOTE — ANESTHESIA PRE PROCEDURE
medications:    No current facility-administered medications for this encounter. Current Outpatient Prescriptions   Medication Sig Dispense Refill    baclofen (LIORESAL) 10 MG tablet TAKE ONE TABLET BY MOUTH THREE TIMES DAILY 90 tablet 1    amLODIPine-atorvastatatin (CADUET) 10-40 MG per tablet TAKE ONE TABLET BY MOUTH ONCE DAILY 90 tablet 1    rOPINIRole (REQUIP) 0.25 MG tablet TAKE ONE TABLET BY MOUTH THREE TIMES DAILY 90 tablet 3    furosemide (LASIX) 20 MG tablet Take 1 tablet by mouth every 48 hours as needed (fluid retention) 30 tablet 0    potassium chloride (KLOR-CON M) 20 MEQ extended release tablet Take 1 tablet by mouth every 48 hours as needed (when taking lasix that day) 30 tablet 1    fluticasone (FLONASE) 50 MCG/ACT nasal spray 2 sprays by Nasal route daily 1 Bottle 3    Cetirizine HCl (ZYRTEC ALLERGY) 10 MG CAPS Take 10 mg by mouth daily 90 capsule 3    albuterol sulfate HFA (VENTOLIN HFA) 108 (90 Base) MCG/ACT inhaler Inhale 2 puffs into the lungs every 6 hours as needed for Wheezing 1 Inhaler 3    fluticasone-salmeterol (ADVAIR) 250-50 MCG/DOSE AEPB Inhale 1 puff into the lungs every 12 hours 60 each 5    sertraline (ZOLOFT) 50 MG tablet TAKE ONE TABLET BY MOUTH ONCE DAILY 30 tablet 5    polyethyl glycol-propyl glycol 0.4-0.3 % (SYSTANE) 0.4-0.3 % ophthalmic solution 1 drop as needed for Dry Eyes         Allergies: Allergies   Allergen Reactions    Seasonal      Grass, trees, dust, pollen.   YEAR ROUND       Problem List:    Patient Active Problem List   Diagnosis Code    Seasonal allergies J30.2    Mild intermittent asthma without complication R84.43    Hypertension I10    Generalized anxiety disorder F41.1    Restless leg syndrome G25.81    Arthritis M19.90    Fibromyalgia M79.7    Dyslipidemia E78.5    Osteopenia M85.80    At high risk for caregiver role strain Z91.89    Class 3 obesity with body mass index (BMI) of 40.0 to 44.9 in adult (HCC) E66.9, Z68.41    Chronic maxillary sinusitis J32.0    Deviated septum J34.2    Hypertrophy of nasal turbinates J34.3       Past Medical History:        Diagnosis Date    Allergic rhinitis     Arthritis     Asthma     Chronic sinusitis     Class 3 obesity with body mass index (BMI) of 40.0 to 44.9 in adult [facility]) 11/7/2017    Deviated septum     Fibromyalgia 12/5/2016    Hyperlipidemia     Hypertension     Hypertrophy of nasal turbinates     Restless leg syndrome 12/5/2016       Past Surgical History:        Procedure Laterality Date    APPENDECTOMY      CHOLECYSTECTOMY      COLONOSCOPY      ENDOSCOPY, COLON, DIAGNOSTIC         Social History:    Social History   Substance Use Topics    Smoking status: Former Smoker     Types: Cigarettes    Smokeless tobacco: Never Used      Comment: quit 25 years ago    Alcohol use Yes      Comment: occ                                Counseling given: Not Answered      Vital Signs (Current): There were no vitals filed for this visit.                                            BP Readings from Last 3 Encounters:   12/29/17 (!) 126/52   11/07/17 138/62   10/25/17 132/60       NPO Status:                                                                                 BMI:   Wt Readings from Last 3 Encounters:   12/29/17 218 lb (98.9 kg)   11/07/17 217 lb (98.4 kg)   10/25/17 217 lb (98.4 kg)     There is no height or weight on file to calculate BMI.    CBC:   Lab Results   Component Value Date    WBC 9.0 12/29/2017    RBC 4.42 12/29/2017    HGB 13.7 12/29/2017    HCT 41.4 12/29/2017    MCV 93.6 12/29/2017    RDW 14.5 12/29/2017     12/29/2017       CMP:   Lab Results   Component Value Date     12/29/2017    K 3.8 12/29/2017     12/29/2017    CO2 26 12/29/2017    BUN 13 12/29/2017    CREATININE 0.68 12/29/2017    GFRAA >60.0 12/29/2017    LABGLOM >60.0 12/29/2017    GLUCOSE 117 12/29/2017    PROT 6.7 09/15/2017    CALCIUM 8.7 12/29/2017    BILITOT 0.5 09/15/2017

## 2018-01-04 NOTE — PROGRESS NOTES
DR. Kaykay Shin was in to see patient. She is stable and comfortable.   Taking ice chips and crackers and tolerating well

## 2018-02-20 ENCOUNTER — OFFICE VISIT (OUTPATIENT)
Dept: FAMILY MEDICINE CLINIC | Age: 78
End: 2018-02-20
Payer: MEDICARE

## 2018-02-20 VITALS
WEIGHT: 213.4 LBS | HEART RATE: 77 BPM | OXYGEN SATURATION: 91 % | DIASTOLIC BLOOD PRESSURE: 80 MMHG | RESPIRATION RATE: 20 BRPM | TEMPERATURE: 98.4 F | BODY MASS INDEX: 44.6 KG/M2 | SYSTOLIC BLOOD PRESSURE: 136 MMHG

## 2018-02-20 DIAGNOSIS — J30.1 CHRONIC SEASONAL ALLERGIC RHINITIS DUE TO POLLEN: ICD-10-CM

## 2018-02-20 DIAGNOSIS — R06.2 WHEEZING: ICD-10-CM

## 2018-02-20 DIAGNOSIS — R05.9 COUGH: ICD-10-CM

## 2018-02-20 DIAGNOSIS — J45.41 MODERATE PERSISTENT ASTHMA WITH ACUTE EXACERBATION: Chronic | ICD-10-CM

## 2018-02-20 DIAGNOSIS — J40 BRONCHITIS: Primary | ICD-10-CM

## 2018-02-20 PROBLEM — J45.909 ASTHMA: Chronic | Status: ACTIVE | Noted: 2018-02-20

## 2018-02-20 PROCEDURE — 99213 OFFICE O/P EST LOW 20 MIN: CPT | Performed by: FAMILY MEDICINE

## 2018-02-20 RX ORDER — BACLOFEN 10 MG/1
TABLET ORAL
Qty: 90 TABLET | Refills: 1 | Status: CANCELLED | OUTPATIENT
Start: 2018-02-20

## 2018-02-20 RX ORDER — PREDNISONE 10 MG/1
TABLET ORAL
Qty: 45 TABLET | Refills: 0 | Status: SHIPPED | OUTPATIENT
Start: 2018-02-20 | End: 2018-04-27 | Stop reason: ALTCHOICE

## 2018-02-20 ASSESSMENT — ENCOUNTER SYMPTOMS
EYE DISCHARGE: 0
SHORTNESS OF BREATH: 1
ABDOMINAL PAIN: 0
RHINORRHEA: 1
NAUSEA: 0
WHEEZING: 1
VOMITING: 0
TROUBLE SWALLOWING: 0
SORE THROAT: 0
SINUS PRESSURE: 0
CHEST TIGHTNESS: 1
DIARRHEA: 0
SINUS PAIN: 0
COUGH: 1

## 2018-02-20 NOTE — PROGRESS NOTES
Subjective:      Patient ID: Ani Kaiser is a 70 y.o. female who presents today for:  Chief Complaint   Patient presents with    URI     Presents today C/O URI SX X 2-3 days/ SX include cough, congestion, SOB, wheezing, runny nose, chills, low grade temp of 99.4 at the highest. Denies any body aches, sweats, nausea, vomiting or diarrhea. Has a past HX of Pneumonia and is concerned that she could have Pneumonia. HPI     Patient here for acute visit regarding respiratory symptoms. She reports 2-3 day history of malaise, nasal congestion, runny nose, postnasal drainage, chills, and subjective fever with temps up to 99.4 F at home. There is harsh frequent cough occasionally productive of mucus with noted wheezing and tightness with breathing that she has been managing with use of albuterol Q6 hours. She denies any dyspnea at rest but has noted more problems with breathing when she is more active. She states she has not been using her advair at home over the past several days. She denies any myalgias, headache, sinus pain, sore throat, dyspnea, chest pain, abdominal pain, nausea/vomiting, diarrhea, or skin rash. She denies any close sick contacts.     Past Medical History:   Diagnosis Date    Allergic rhinitis     Arthritis     Asthma     Chronic sinusitis     Class 3 obesity with body mass index (BMI) of 40.0 to 44.9 in adult [facility]) 11/7/2017    Deviated septum     Fibromyalgia 12/5/2016    Hyperlipidemia     Hypertension     Hypertrophy of nasal turbinates     Restless leg syndrome 12/5/2016     Past Surgical History:   Procedure Laterality Date    APPENDECTOMY      CHOLECYSTECTOMY      COLONOSCOPY      ENDOSCOPY, COLON, DIAGNOSTIC      MI OFFICE/OUTPT VISIT,PROCEDURE ONLY N/A 1/4/2018    SEPTOPLASTY MICRODEBRIDER ASSISTED TURBINOPALSTY AND OUT-FRACTURING BILATERAL BALLOON SINUPLASTY OF MAXILLARY AND SPENOID SINUSES performed by Mechelle Sy MD at [facility]     Family History   Problem Relation Age of Onset    Allergy (Severe) Paternal Cousin    [place] Cancer Mother      renal CA    Heart Disease Father     Heart Attack Father     Cancer Sister      Breast CA    No Known Problems Daughter     No Known Problems Daughter      Social History     Social History    Marital status:      Spouse name: N/A    Number of children: N/A    Years of education: N/A     Occupational History    Not on file.      Social History Main Topics    Smoking status: Former Smoker     Types: Cigarettes    Smokeless tobacco: Never Used      Comment: quit 25 years ago    Alcohol use Yes      Comment: occ    Drug use: No    Sexual activity: Not on file     Other Topics Concern    Not on file     Social History Narrative    No narrative on file     Current Outpatient Prescriptions on File Prior to Visit   Medication Sig Dispense Refill    baclofen (LIORESAL) 10 MG tablet TAKE ONE TABLET BY MOUTH THREE TIMES DAILY 90 tablet 1    amLODIPine-atorvastatatin (CADUET) 10-40 MG per tablet TAKE ONE TABLET BY MOUTH ONCE DAILY 90 tablet 1    rOPINIRole (REQUIP) 0.25 MG tablet TAKE ONE TABLET BY MOUTH THREE TIMES DAILY 90 tablet 3    furosemide (LASIX) 20 MG tablet Take 1 tablet by mouth every 48 hours as needed (fluid retention) 30 tablet 0    potassium chloride (KLOR-CON M) 20 MEQ extended release tablet Take 1 tablet by mouth every 48 hours as needed (when taking lasix that day) 30 tablet 1    fluticasone (FLONASE) 50 MCG/ACT nasal spray 2 sprays by Nasal route daily 1 Bottle 3    Cetirizine HCl (ZYRTEC ALLERGY) 10 MG CAPS Take 10 mg by mouth daily 90 capsule 3    albuterol sulfate HFA (VENTOLIN HFA) 108 (90 Base) MCG/ACT inhaler Inhale 2 puffs into the lungs every 6 hours as needed for Wheezing 1 Inhaler 3    sertraline (ZOLOFT) 50 MG tablet TAKE ONE TABLET BY MOUTH ONCE DAILY 30 tablet 5    polyethyl glycol-propyl glycol 0.4-0.3 % (SYSTANE) 0.4-0.3 % ophthalmic solution 1 drop as needed for Dry Eyes discharge. Left eye exhibits no discharge. Neck: Neck supple. No thyromegaly present. Cardiovascular: Normal rate, regular rhythm and normal heart sounds. No murmur heard. Pulmonary/Chest: Effort normal. No accessory muscle usage. No tachypnea. No respiratory distress. She has decreased breath sounds (throughout all lung fields). She has wheezes ( expiratory wheezing throughout all lung fields). She has no rhonchi. She has no rales. Abdominal: Soft. Bowel sounds are normal. There is no tenderness. Musculoskeletal: Normal range of motion. She exhibits no edema. Lymphadenopathy:     She has cervical adenopathy (tender anterior cervical nodes bilaterally). Neurological: She is alert and oriented to person, place, and time. Skin: No rash noted. She is not diaphoretic. Ortho Exam (If Applicable)      Assessment & Plan:      1. Bronchitis  Will treat supportively for viral infection initially with supportive care and course of nasal spray and antihistamine. Patient was instructed to call office if no better or worsening over the next 5-7 days to start antibiotic for secondary bacterial infection. Patient is to return to office if no better or worsening despite treatment. 2. Wheezing  Likely due to airway exacerbation. Will treat with tapered steroid and have her use scheduled albuterol Q6H for the next 5 days and then Q6H PRN afterward. See below. - predniSONE (DELTASONE) 10 MG tablet; Take 5 tabs daily x 3 days, 4 tabs daily x 3 days, 3 tabs daily x 3 days, 2 tabs daily x 3 days, 1 tab daily x 3 days  Dispense: 45 tablet; Refill: 0    3. Cough  Likely due to PND and airway exacerbation. Patient to restart nasal spray. See below    4. Moderate persistent asthma with acute exacerbation  I stressed with patient the need to restart Advair twice daily at baseline for underlying asthma management.   She was instructed to go to the emergency room for any worsening dyspnea despite use of prednisone

## 2018-02-22 ENCOUNTER — TELEPHONE (OUTPATIENT)
Dept: FAMILY MEDICINE CLINIC | Age: 78
End: 2018-02-22

## 2018-02-22 ENCOUNTER — OFFICE VISIT (OUTPATIENT)
Dept: FAMILY MEDICINE CLINIC | Age: 78
End: 2018-02-22
Payer: MEDICARE

## 2018-02-22 VITALS
SYSTOLIC BLOOD PRESSURE: 136 MMHG | HEIGHT: 59 IN | RESPIRATION RATE: 14 BRPM | WEIGHT: 212.6 LBS | TEMPERATURE: 98.4 F | HEART RATE: 81 BPM | DIASTOLIC BLOOD PRESSURE: 70 MMHG | OXYGEN SATURATION: 93 % | BODY MASS INDEX: 42.86 KG/M2

## 2018-02-22 DIAGNOSIS — J01.90 ACUTE BACTERIAL SINUSITIS: Primary | ICD-10-CM

## 2018-02-22 DIAGNOSIS — B96.89 ACUTE BACTERIAL SINUSITIS: Primary | ICD-10-CM

## 2018-02-22 PROCEDURE — G8399 PT W/DXA RESULTS DOCUMENT: HCPCS | Performed by: NURSE PRACTITIONER

## 2018-02-22 PROCEDURE — 1036F TOBACCO NON-USER: CPT | Performed by: NURSE PRACTITIONER

## 2018-02-22 PROCEDURE — 99213 OFFICE O/P EST LOW 20 MIN: CPT | Performed by: NURSE PRACTITIONER

## 2018-02-22 PROCEDURE — 1090F PRES/ABSN URINE INCON ASSESS: CPT | Performed by: NURSE PRACTITIONER

## 2018-02-22 PROCEDURE — 4040F PNEUMOC VAC/ADMIN/RCVD: CPT | Performed by: NURSE PRACTITIONER

## 2018-02-22 PROCEDURE — 1123F ACP DISCUSS/DSCN MKR DOCD: CPT | Performed by: NURSE PRACTITIONER

## 2018-02-22 PROCEDURE — G8427 DOCREV CUR MEDS BY ELIG CLIN: HCPCS | Performed by: NURSE PRACTITIONER

## 2018-02-22 PROCEDURE — G8484 FLU IMMUNIZE NO ADMIN: HCPCS | Performed by: NURSE PRACTITIONER

## 2018-02-22 PROCEDURE — G8417 CALC BMI ABV UP PARAM F/U: HCPCS | Performed by: NURSE PRACTITIONER

## 2018-02-22 RX ORDER — AMOXICILLIN AND CLAVULANATE POTASSIUM 875; 125 MG/1; MG/1
1 TABLET, FILM COATED ORAL 2 TIMES DAILY
Qty: 20 TABLET | Refills: 0 | Status: SHIPPED | OUTPATIENT
Start: 2018-02-22 | End: 2018-03-04

## 2018-02-22 NOTE — TELEPHONE ENCOUNTER
I would recommend that the patient be evaluated as he may need to start antibiotics and obtain a chest x-ray

## 2018-02-22 NOTE — PROGRESS NOTES
pressure. Negative for hoarse voice, sneezing and sore throat. Eyes: Negative. Respiratory: Positive for cough, chest tightness and wheezing. Negative for shortness of breath. Cardiovascular: Negative for chest pain and palpitations. Gastrointestinal: Negative for abdominal distention, abdominal pain, anal bleeding, blood in stool, constipation, diarrhea, nausea and vomiting. Musculoskeletal: Negative for neck pain. Neurological: Negative for headaches. Objective:   /70 (Site: Left Arm, Position: Sitting, Cuff Size: Large Adult)   Pulse 81   Temp 98.4 °F (36.9 °C) (Temporal)   Resp 14   Ht 4' 11\" (1.499 m)   Wt 212 lb 9.6 oz (96.4 kg)   SpO2 93%   Breastfeeding? No   BMI 42.94 kg/m²     Physical Exam   Constitutional: She is oriented to person, place, and time. Vital signs are normal. She appears well-developed and well-nourished. No distress. HENT:   Head: Normocephalic and atraumatic. Right Ear: Tympanic membrane, external ear and ear canal normal.   Left Ear: Tympanic membrane, external ear and ear canal normal.   Nose: Mucosal edema, rhinorrhea and sinus tenderness present. Right sinus exhibits maxillary sinus tenderness. Right sinus exhibits no frontal sinus tenderness. Left sinus exhibits maxillary sinus tenderness. Left sinus exhibits no frontal sinus tenderness. Mouth/Throat: Uvula is midline and mucous membranes are normal. Posterior oropharyngeal erythema present. No oropharyngeal exudate or posterior oropharyngeal edema. Eyes: Conjunctivae and lids are normal.   Cardiovascular: Normal rate, regular rhythm, S1 normal, S2 normal, normal heart sounds, intact distal pulses and normal pulses. No murmur heard. Pulmonary/Chest: Effort normal. No accessory muscle usage. No respiratory distress. She has no decreased breath sounds. She has wheezes. She has no rhonchi. She has no rales. Lymphadenopathy:     She has cervical adenopathy.    Neurological: She is alert and

## 2018-02-23 ASSESSMENT — ENCOUNTER SYMPTOMS
VOMITING: 0
HOARSE VOICE: 0
SORE THROAT: 0
ANAL BLEEDING: 0
WHEEZING: 1
BLOOD IN STOOL: 0
SHORTNESS OF BREATH: 0
SWOLLEN GLANDS: 1
ABDOMINAL PAIN: 0
COUGH: 1
EYES NEGATIVE: 1
NAUSEA: 0
SINUS PRESSURE: 1
RHINORRHEA: 1
CONSTIPATION: 0
ABDOMINAL DISTENTION: 0
DIARRHEA: 0
CHEST TIGHTNESS: 1

## 2018-03-01 DIAGNOSIS — M62.838 MUSCLE SPASM: ICD-10-CM

## 2018-03-01 RX ORDER — BACLOFEN 10 MG/1
TABLET ORAL
Qty: 90 TABLET | Refills: 2 | Status: SHIPPED | OUTPATIENT
Start: 2018-03-01 | End: 2018-04-27 | Stop reason: SDUPTHER

## 2018-03-01 RX ORDER — ROPINIROLE 0.25 MG/1
TABLET, FILM COATED ORAL
Qty: 90 TABLET | Refills: 2 | Status: SHIPPED | OUTPATIENT
Start: 2018-03-01 | End: 2018-04-27 | Stop reason: SDUPTHER

## 2018-03-07 DIAGNOSIS — J30.1 SEASONAL ALLERGIC RHINITIS DUE TO POLLEN: ICD-10-CM

## 2018-03-07 NOTE — TELEPHONE ENCOUNTER
Last seen 2/22/2018. Has a follow up appointment scheduled 3/29/2018. Medication is pending if okay. Please advise.

## 2018-03-08 RX ORDER — FLUTICASONE PROPIONATE 50 MCG
SPRAY, SUSPENSION (ML) NASAL
Qty: 3 BOTTLE | Refills: 1 | Status: SHIPPED | OUTPATIENT
Start: 2018-03-08 | End: 2018-04-27 | Stop reason: SDUPTHER

## 2018-03-22 ENCOUNTER — HOSPITAL ENCOUNTER (OUTPATIENT)
Age: 78
Setting detail: SPECIMEN
Discharge: HOME | End: 2018-03-22
Payer: MEDICARE

## 2018-03-22 ENCOUNTER — HOSPITAL ENCOUNTER (EMERGENCY)
Age: 78
Discharge: HOME | End: 2018-03-22
Attending: EMERGENCY MEDICINE
Payer: MEDICARE

## 2018-03-22 ENCOUNTER — APPOINTMENT (OUTPATIENT)
Dept: GENERAL RADIOLOGY | Age: 78
End: 2018-03-22
Payer: MEDICARE

## 2018-03-22 VITALS
TEMPERATURE: 98.3 F | WEIGHT: 212 LBS | RESPIRATION RATE: 16 BRPM | HEIGHT: 59 IN | SYSTOLIC BLOOD PRESSURE: 176 MMHG | BODY MASS INDEX: 42.74 KG/M2 | HEART RATE: 65 BPM | DIASTOLIC BLOOD PRESSURE: 70 MMHG | OXYGEN SATURATION: 94 %

## 2018-03-22 DIAGNOSIS — S82.62XA CLOSED DISPLACED FRACTURE OF LATERAL MALLEOLUS OF LEFT FIBULA, INITIAL ENCOUNTER: Primary | ICD-10-CM

## 2018-03-22 DIAGNOSIS — R63.5 WEIGHT GAIN: ICD-10-CM

## 2018-03-22 LAB
ALBUMIN SERPL-MCNC: 4.3 G/DL (ref 3.9–4.9)
ALP BLD-CCNC: 74 U/L (ref 40–130)
ALT SERPL-CCNC: 23 U/L (ref 0–33)
ANION GAP SERPL CALCULATED.3IONS-SCNC: 22 MEQ/L (ref 7–13)
AST SERPL-CCNC: 20 U/L (ref 0–35)
BASOPHILS ABSOLUTE: 0 K/UL (ref 0–0.2)
BASOPHILS RELATIVE PERCENT: 0.4 %
BILIRUB SERPL-MCNC: 0.6 MG/DL (ref 0–1.2)
BUN BLDV-MCNC: 10 MG/DL (ref 8–23)
CALCIUM SERPL-MCNC: 9.3 MG/DL (ref 8.6–10.2)
CHLORIDE BLD-SCNC: 102 MEQ/L (ref 98–107)
CO2: 23 MEQ/L (ref 22–29)
CREAT SERPL-MCNC: 0.62 MG/DL (ref 0.5–0.9)
EOSINOPHILS ABSOLUTE: 0.2 K/UL (ref 0–0.7)
EOSINOPHILS RELATIVE PERCENT: 1.9 %
GFR AFRICAN AMERICAN: >60
GFR NON-AFRICAN AMERICAN: >60
GLOBULIN: 2.5 G/DL (ref 2.3–3.5)
GLUCOSE BLD-MCNC: 90 MG/DL (ref 74–109)
HCT VFR BLD CALC: 40.6 % (ref 37–47)
HEMOGLOBIN: 13.9 G/DL (ref 12–16)
LYMPHOCYTES ABSOLUTE: 1.3 K/UL (ref 1–4.8)
LYMPHOCYTES RELATIVE PERCENT: 14.4 %
MCH RBC QN AUTO: 30.6 PG (ref 27–31.3)
MCHC RBC AUTO-ENTMCNC: 34.2 % (ref 33–37)
MCV RBC AUTO: 89.5 FL (ref 82–100)
MONOCYTES ABSOLUTE: 0.6 K/UL (ref 0.2–0.8)
MONOCYTES RELATIVE PERCENT: 6.3 %
NEUTROPHILS ABSOLUTE: 7.2 K/UL (ref 1.4–6.5)
NEUTROPHILS RELATIVE PERCENT: 77 %
PDW BLD-RTO: 15.5 % (ref 11.5–14.5)
PLATELET # BLD: 206 K/UL (ref 130–400)
POTASSIUM SERPL-SCNC: 4.1 MEQ/L (ref 3.5–5.1)
RBC # BLD: 4.54 M/UL (ref 4.2–5.4)
SODIUM BLD-SCNC: 147 MEQ/L (ref 132–144)
TOTAL PROTEIN: 6.8 G/DL (ref 6.4–8.1)
TSH SERPL DL<=0.05 MIU/L-ACNC: 3.67 UIU/ML (ref 0.27–4.2)
WBC # BLD: 9.3 K/UL (ref 4.8–10.8)

## 2018-03-22 PROCEDURE — 85025 COMPLETE CBC W/AUTO DIFF WBC: CPT

## 2018-03-22 PROCEDURE — 84443 ASSAY THYROID STIM HORMONE: CPT

## 2018-03-22 PROCEDURE — 84439 ASSAY OF FREE THYROXINE: CPT

## 2018-03-22 PROCEDURE — 6360000002 HC RX W HCPCS: Performed by: EMERGENCY MEDICINE

## 2018-03-22 PROCEDURE — 96372 THER/PROPH/DIAG INJ SC/IM: CPT

## 2018-03-22 PROCEDURE — 99283 EMERGENCY DEPT VISIT LOW MDM: CPT

## 2018-03-22 PROCEDURE — 73630 X-RAY EXAM OF FOOT: CPT

## 2018-03-22 PROCEDURE — 73610 X-RAY EXAM OF ANKLE: CPT

## 2018-03-22 PROCEDURE — 29515 APPLICATION SHORT LEG SPLINT: CPT

## 2018-03-22 PROCEDURE — 80053 COMPREHEN METABOLIC PANEL: CPT

## 2018-03-22 RX ORDER — KETOROLAC TROMETHAMINE 10 MG/1
10 TABLET, FILM COATED ORAL EVERY 6 HOURS PRN
Qty: 20 TABLET | Refills: 0 | Status: SHIPPED | OUTPATIENT
Start: 2018-03-22 | End: 2018-04-27 | Stop reason: ALTCHOICE

## 2018-03-22 RX ORDER — KETOROLAC TROMETHAMINE 30 MG/ML
60 INJECTION, SOLUTION INTRAMUSCULAR; INTRAVENOUS ONCE
Status: COMPLETED | OUTPATIENT
Start: 2018-03-22 | End: 2018-03-22

## 2018-03-22 RX ORDER — TRAMADOL HYDROCHLORIDE 50 MG/1
50 TABLET ORAL EVERY 6 HOURS PRN
Qty: 12 TABLET | Refills: 0 | Status: SHIPPED | OUTPATIENT
Start: 2018-03-22 | End: 2018-03-25

## 2018-03-22 RX ADMIN — KETOROLAC TROMETHAMINE 60 MG: 30 INJECTION, SOLUTION INTRAMUSCULAR at 17:32

## 2018-03-22 ASSESSMENT — ENCOUNTER SYMPTOMS
SINUS PRESSURE: 0
CONSTIPATION: 0
VOMITING: 0
SORE THROAT: 0
EYE PAIN: 0
APNEA: 0
ABDOMINAL DISTENTION: 0
SHORTNESS OF BREATH: 0
BACK PAIN: 0
DIARRHEA: 0
ABDOMINAL PAIN: 0
PHOTOPHOBIA: 0
COLOR CHANGE: 0
NAUSEA: 0
COUGH: 0
WHEEZING: 0
RHINORRHEA: 0

## 2018-03-22 ASSESSMENT — PAIN DESCRIPTION - ORIENTATION: ORIENTATION: LEFT

## 2018-03-22 ASSESSMENT — PAIN DESCRIPTION - DESCRIPTORS: DESCRIPTORS: SHARP

## 2018-03-22 ASSESSMENT — PAIN DESCRIPTION - LOCATION: LOCATION: ANKLE

## 2018-03-22 ASSESSMENT — PAIN DESCRIPTION - FREQUENCY: FREQUENCY: CONTINUOUS

## 2018-03-22 ASSESSMENT — PAIN DESCRIPTION - PAIN TYPE: TYPE: ACUTE PAIN

## 2018-03-22 ASSESSMENT — PAIN SCALES - GENERAL: PAINLEVEL_OUTOF10: 5

## 2018-03-22 NOTE — ED PROVIDER NOTES
and headaches. Psychiatric/Behavioral: Negative for agitation, confusion and hallucinations. All other systems reviewed and are negative. Except as noted above the remainder of the review of systems was reviewed and negative.        PAST MEDICAL HISTORY     Past Medical History:   Diagnosis Date    Allergic rhinitis     Arthritis     Asthma     Chronic sinusitis     Class 3 obesity with body mass index (BMI) of 40.0 to 44.9 in adult [facility]) 11/7/2017    Deviated septum     Fibromyalgia 12/5/2016    Hyperlipidemia     Hypertension     Hypertrophy of nasal turbinates     Restless leg syndrome 12/5/2016         SURGICAL HISTORY       Past Surgical History:   Procedure Laterality Date    APPENDECTOMY      CHOLECYSTECTOMY      COLONOSCOPY      ENDOSCOPY, COLON, DIAGNOSTIC      CO OFFICE/OUTPT VISIT,PROCEDURE ONLY N/A 1/4/2018    SEPTOPLASTY MICRODEBRIDER ASSISTED TURBINOPALSTY AND OUT-FRACTURING BILATERAL BALLOON SINUPLASTY OF MAXILLARY AND SPENOID SINUSES performed by Dimas Pineda MD at [address] [place] [address]       Discharge Medication List as of 3/22/2018  6:21 PM      CONTINUE these medications which have NOT CHANGED    Details   !! fluticasone (FLONASE) 50 MCG/ACT nasal spray USE ONE SPRAY(S) IN EACH NOSTRIL ONCE DAILY, Disp-3 Bottle, R-1Please consider 90 day supplies to promote better adherenceNormal      rOPINIRole (REQUIP) 0.25 MG tablet TAKE ONE TABLET BY MOUTH THREE TIMES DAILY, Disp-90 tablet, R-2Please consider 90 day supplies to promote better adherenceNormal      baclofen (LIORESAL) 10 MG tablet TAKE ONE TABLET BY MOUTH THREE TIMES DAILY, Disp-90 tablet, R-2Please consider 90 day supplies to promote better adherenceNormal      predniSONE (DELTASONE) 10 MG tablet Take 5 tabs daily x 3 days, 4 tabs daily x 3 days, 3 tabs daily x 3 days, 2 tabs daily x 3 days, 1 tab daily x 3 days, Disp-45 tablet, R-0Normal      fluticasone-salmeterol (ADVAIR) 250-50 MCG/DOSE AEPB Inhale Asked     Other Topics Concern    None     Social History Narrative    None       SCREENINGS             PHYSICAL EXAM    (up to 7 for level 4, 8 or more for level 5)     ED Triage Vitals [03/22/18 1707]   BP Temp Temp Source Pulse Resp SpO2 Height Weight   (!) 176/70 98.3 °F (36.8 °C) Oral 59 18 96 % 4' 11\" (1.499 m) 212 lb (96.2 kg)       Physical Exam   Constitutional: She is oriented to person, place, and time. She appears well-developed and well-nourished. No distress. HENT:   Head: Normocephalic and atraumatic. Nose: Nose normal.   Mouth/Throat: Oropharynx is clear and moist. No oropharyngeal exudate. Eyes: Conjunctivae and EOM are normal. Pupils are equal, round, and reactive to light. Right eye exhibits no discharge. Left eye exhibits no discharge. No scleral icterus. Neck: Normal range of motion. Neck supple. No JVD present. No tracheal deviation present. No thyromegaly present. Cardiovascular: Normal rate, regular rhythm, normal heart sounds and intact distal pulses. Exam reveals no gallop and no friction rub. No murmur heard. Pulmonary/Chest: Effort normal and breath sounds normal. No stridor. No respiratory distress. She has no wheezes. She has no rales. She exhibits no tenderness. Abdominal: Soft. Bowel sounds are normal. She exhibits no distension and no mass. There is no tenderness. There is no rebound and no guarding. Musculoskeletal: Normal range of motion. She exhibits edema and tenderness. She exhibits no deformity. Right ankle swelling with no deformities. Right ankle tenderness. Right foot tenderness. Lymphadenopathy:     She has no cervical adenopathy. Neurological: She is alert and oriented to person, place, and time. She has normal reflexes. No cranial nerve deficit. She exhibits normal muscle tone. Coordination normal.   Skin: Skin is warm and dry. No rash noted. She is not diaphoretic. No erythema. No pallor. Psychiatric: She has a normal mood and affect.  Her patient's condition which required my urgent intervention. CONSULTS:  None    PROCEDURES:  Unless otherwise noted below, none     Procedures    FINAL IMPRESSION      1. Closed displaced fracture of lateral malleolus of left fibula, initial encounter          DISPOSITION/PLAN   DISPOSITION Decision To Discharge 03/22/2018 06:23:49 PM      PATIENT REFERRED TO:  Rejielina Yuan VERNON [address], [address] [address] [address]  [phone]    In 1 week      Chandrakant Beltran MD  [id number] [place] [id number]    In 1 day        DISCHARGE MEDICATIONS:  Discharge Medication List as of 3/22/2018  6:21 PM      START taking these medications    Details   ketorolac (TORADOL) 10 MG tablet Take 1 tablet by mouth every 6 hours as needed for Pain, Disp-20 tablet, R-0Print      traMADol (ULTRAM) 50 MG tablet Take 1 tablet by mouth every 6 hours as needed for Pain for up to 3 days .  Take lowest dose possible to manage pain., Disp-12 tablet, R-0Print                (Please note that portions of this note were completed with a voice recognition program.  Efforts were made to edit the dictations but occasionally words are mis-transcribed.)    Megan Hunt MD (electronically signed)  Attending Emergency Physician          Megan Hunt MD  03/23/18 7229

## 2018-03-23 LAB — T4 FREE: 1.07 NG/DL (ref 0.93–1.7)

## 2018-04-23 DIAGNOSIS — F41.1 GENERALIZED ANXIETY DISORDER: ICD-10-CM

## 2018-04-27 ENCOUNTER — OFFICE VISIT (OUTPATIENT)
Dept: FAMILY MEDICINE CLINIC | Age: 78
End: 2018-04-27
Payer: MEDICARE

## 2018-04-27 VITALS
RESPIRATION RATE: 14 BRPM | DIASTOLIC BLOOD PRESSURE: 70 MMHG | TEMPERATURE: 98.2 F | OXYGEN SATURATION: 96 % | WEIGHT: 219 LBS | SYSTOLIC BLOOD PRESSURE: 156 MMHG | HEART RATE: 64 BPM | BODY MASS INDEX: 44.15 KG/M2 | HEIGHT: 59 IN

## 2018-04-27 DIAGNOSIS — J45.41 MODERATE PERSISTENT ASTHMA WITH ACUTE EXACERBATION: ICD-10-CM

## 2018-04-27 DIAGNOSIS — S82.892D CLOSED FRACTURE OF LEFT ANKLE WITH ROUTINE HEALING, SUBSEQUENT ENCOUNTER: ICD-10-CM

## 2018-04-27 DIAGNOSIS — I10 ESSENTIAL HYPERTENSION: Primary | ICD-10-CM

## 2018-04-27 DIAGNOSIS — Z23 NEED FOR VACCINATION FOR STREP PNEUMONIAE: ICD-10-CM

## 2018-04-27 DIAGNOSIS — M62.838 MUSCLE SPASM: ICD-10-CM

## 2018-04-27 DIAGNOSIS — G25.81 RESTLESS LEG SYNDROME: ICD-10-CM

## 2018-04-27 DIAGNOSIS — E78.5 DYSLIPIDEMIA: ICD-10-CM

## 2018-04-27 DIAGNOSIS — F34.1 DYSTHYMIA: ICD-10-CM

## 2018-04-27 PROBLEM — S82.892A CLOSED FRACTURE OF LEFT ANKLE: Status: ACTIVE | Noted: 2018-04-27

## 2018-04-27 PROCEDURE — G8417 CALC BMI ABV UP PARAM F/U: HCPCS | Performed by: NURSE PRACTITIONER

## 2018-04-27 PROCEDURE — 4040F PNEUMOC VAC/ADMIN/RCVD: CPT | Performed by: NURSE PRACTITIONER

## 2018-04-27 PROCEDURE — 90732 PPSV23 VACC 2 YRS+ SUBQ/IM: CPT | Performed by: NURSE PRACTITIONER

## 2018-04-27 PROCEDURE — 1123F ACP DISCUSS/DSCN MKR DOCD: CPT | Performed by: NURSE PRACTITIONER

## 2018-04-27 PROCEDURE — G8427 DOCREV CUR MEDS BY ELIG CLIN: HCPCS | Performed by: NURSE PRACTITIONER

## 2018-04-27 PROCEDURE — G0009 ADMIN PNEUMOCOCCAL VACCINE: HCPCS | Performed by: NURSE PRACTITIONER

## 2018-04-27 PROCEDURE — 1090F PRES/ABSN URINE INCON ASSESS: CPT | Performed by: NURSE PRACTITIONER

## 2018-04-27 PROCEDURE — 99214 OFFICE O/P EST MOD 30 MIN: CPT | Performed by: NURSE PRACTITIONER

## 2018-04-27 PROCEDURE — G8399 PT W/DXA RESULTS DOCUMENT: HCPCS | Performed by: NURSE PRACTITIONER

## 2018-04-27 PROCEDURE — 1036F TOBACCO NON-USER: CPT | Performed by: NURSE PRACTITIONER

## 2018-04-27 RX ORDER — ALBUTEROL SULFATE 90 UG/1
2 AEROSOL, METERED RESPIRATORY (INHALATION) EVERY 6 HOURS PRN
Qty: 1 INHALER | Refills: 3 | Status: SHIPPED | OUTPATIENT
Start: 2018-04-27 | End: 2019-03-20 | Stop reason: SDUPTHER

## 2018-04-27 RX ORDER — AMLODIPINE BESYLATE 10 MG/1
10 TABLET ORAL DAILY
Qty: 90 TABLET | Refills: 1 | Status: SHIPPED | OUTPATIENT
Start: 2018-04-27 | End: 2019-01-16 | Stop reason: ALTCHOICE

## 2018-04-27 RX ORDER — ROPINIROLE 0.5 MG/1
TABLET, FILM COATED ORAL
Qty: 90 TABLET | Refills: 2 | Status: SHIPPED | OUTPATIENT
Start: 2018-04-27 | End: 2018-09-25 | Stop reason: SDUPTHER

## 2018-04-27 RX ORDER — ATORVASTATIN CALCIUM 40 MG/1
40 TABLET, FILM COATED ORAL DAILY
Qty: 90 TABLET | Refills: 1 | Status: SHIPPED | OUTPATIENT
Start: 2018-04-27 | End: 2019-01-26 | Stop reason: SDUPTHER

## 2018-04-27 RX ORDER — BACLOFEN 10 MG/1
TABLET ORAL
Qty: 90 TABLET | Refills: 2 | Status: SHIPPED | OUTPATIENT
Start: 2018-04-27 | End: 2018-09-07 | Stop reason: SDUPTHER

## 2018-05-24 ENCOUNTER — HOSPITAL ENCOUNTER (OUTPATIENT)
Dept: GENERAL RADIOLOGY | Age: 78
Discharge: HOME | End: 2018-05-26
Payer: MEDICARE

## 2018-05-24 DIAGNOSIS — S82.65XD CLOSED NONDISPLACED FRACTURE OF LATERAL MALLEOLUS OF LEFT FIBULA WITH ROUTINE HEALING, SUBSEQUENT ENCOUNTER: ICD-10-CM

## 2018-05-24 PROCEDURE — 73610 X-RAY EXAM OF ANKLE: CPT

## 2018-06-22 DIAGNOSIS — R60.0 BILATERAL EDEMA OF LOWER EXTREMITY: ICD-10-CM

## 2018-06-22 RX ORDER — FUROSEMIDE 20 MG/1
20 TABLET ORAL
Qty: 30 TABLET | Refills: 0 | Status: SHIPPED | OUTPATIENT
Start: 2018-06-22 | End: 2019-02-25 | Stop reason: SDUPTHER

## 2018-07-19 ENCOUNTER — OFFICE VISIT (OUTPATIENT)
Dept: FAMILY MEDICINE CLINIC | Age: 78
End: 2018-07-19
Payer: MEDICARE

## 2018-07-19 VITALS
TEMPERATURE: 97.5 F | BODY MASS INDEX: 43.95 KG/M2 | HEART RATE: 65 BPM | WEIGHT: 218 LBS | OXYGEN SATURATION: 94 % | HEIGHT: 59 IN | DIASTOLIC BLOOD PRESSURE: 70 MMHG | RESPIRATION RATE: 16 BRPM | SYSTOLIC BLOOD PRESSURE: 132 MMHG

## 2018-07-19 DIAGNOSIS — E66.01 MORBID OBESITY WITH BMI OF 40.0-44.9, ADULT (HCC): ICD-10-CM

## 2018-07-19 DIAGNOSIS — R06.2 WHEEZE: ICD-10-CM

## 2018-07-19 DIAGNOSIS — J01.01 ACUTE RECURRENT MAXILLARY SINUSITIS: ICD-10-CM

## 2018-07-19 DIAGNOSIS — R06.02 SHORTNESS OF BREATH: ICD-10-CM

## 2018-07-19 DIAGNOSIS — J20.9 ACUTE BRONCHITIS, UNSPECIFIED ORGANISM: Primary | ICD-10-CM

## 2018-07-19 DIAGNOSIS — J30.1 CHRONIC SEASONAL ALLERGIC RHINITIS DUE TO POLLEN: ICD-10-CM

## 2018-07-19 DIAGNOSIS — J45.40 MODERATE PERSISTENT ASTHMA WITHOUT COMPLICATION: ICD-10-CM

## 2018-07-19 PROCEDURE — G8399 PT W/DXA RESULTS DOCUMENT: HCPCS | Performed by: NURSE PRACTITIONER

## 2018-07-19 PROCEDURE — 1123F ACP DISCUSS/DSCN MKR DOCD: CPT | Performed by: NURSE PRACTITIONER

## 2018-07-19 PROCEDURE — 1090F PRES/ABSN URINE INCON ASSESS: CPT | Performed by: NURSE PRACTITIONER

## 2018-07-19 PROCEDURE — G8427 DOCREV CUR MEDS BY ELIG CLIN: HCPCS | Performed by: NURSE PRACTITIONER

## 2018-07-19 PROCEDURE — 1036F TOBACCO NON-USER: CPT | Performed by: NURSE PRACTITIONER

## 2018-07-19 PROCEDURE — 99213 OFFICE O/P EST LOW 20 MIN: CPT | Performed by: NURSE PRACTITIONER

## 2018-07-19 PROCEDURE — G8417 CALC BMI ABV UP PARAM F/U: HCPCS | Performed by: NURSE PRACTITIONER

## 2018-07-19 PROCEDURE — 1101F PT FALLS ASSESS-DOCD LE1/YR: CPT | Performed by: NURSE PRACTITIONER

## 2018-07-19 PROCEDURE — 4040F PNEUMOC VAC/ADMIN/RCVD: CPT | Performed by: NURSE PRACTITIONER

## 2018-07-19 RX ORDER — PREDNISONE 20 MG/1
TABLET ORAL
Qty: 20 TABLET | Refills: 0 | Status: SHIPPED | OUTPATIENT
Start: 2018-07-19 | End: 2018-07-29

## 2018-07-19 RX ORDER — DOXYCYCLINE HYCLATE 100 MG
100 TABLET ORAL 2 TIMES DAILY
Qty: 20 TABLET | Refills: 0 | Status: SHIPPED | OUTPATIENT
Start: 2018-07-19 | End: 2019-06-24 | Stop reason: ALTCHOICE

## 2018-07-19 NOTE — PROGRESS NOTES
Steve Suazo is a 70 y.o. female presenting for cough. HPI:  Cough: Patient complains of productive cough with sputum described as yellow and green and wheezing. Symptoms began 5 days ago. The cough is with wheezing, with shortness of breath and is aggravated by nothing Associated symptoms include:postnasal drip and sputum production. Patient does have a history of asthma. Patient does have a history of environmental allergens. SH:Patient does have a history of smoking. She has not had fever or chills. She is not a current smoker. She states that chronic asthma symptoms have been worsening over recent weeks. Much more frequent need for ventolin. Allergies have been acting up even with taking zyrtec and flonase and mucinex. She used a nebulizer that her sister had and it really helped to open her airways. She wants to know if she might be able to get her own. Allergies   Allergen Reactions    Seasonal      Grass, trees, dust, pollen. YEAR ROUND       EXAM:  Constitutional Blood pressure 132/70, pulse 65, temperature 97.5 °F (36.4 °C), temperature source Temporal, resp. rate 16, height 4' 11\" (1.499 m), weight 218 lb (98.9 kg), SpO2 94 %, not currently breastfeeding. .  She has a normal affect, no acute distress, appears well developed and well nourished. Nose/Sinuses:  Nares normal. Septum midline. Mucosa normal. No drainage or sinus tenderness. Mouth/Throat:  Mucosa moist.  No lesions. Pharynx without erythema, edema or exudate. Neck:  neck- supple, no mass, non-tender and no bruits  Lungs:  Breathing Pattern: regular, no distress, Breath sounds: wheezing- throughout and scattered- harsh cough with scattered rhonchi  Heart:  Heart sounds are normal.  Regular rate and rhythm without murmur, gallop or rub. Extremities: Extremities warm to touch, pink, with no edema. DIAGNOSIS:    Diagnosis Orders   1.  Acute bronchitis, unspecified organism  doxycycline hyclate (VIBRA-TABS) 100 MG tablet predniSONE (DELTASONE) 20 MG tablet    XR CHEST STANDARD (2 VW)   2. Moderate persistent asthma without complication     3. Chronic seasonal allergic rhinitis due to pollen     4. Acute recurrent maxillary sinusitis  doxycycline hyclate (VIBRA-TABS) 100 MG tablet   5. Wheeze  XR CHEST STANDARD (2 VW)   6. Shortness of breath  XR CHEST STANDARD (2 VW)       PLAN: Include orders in the DX section. Follow up as needed if symptoms worsen or fail to improve. Antibiotic and steroid ordered. Nebulizer machine and medication to be ordered through DME. She had good results when she tried her sister's nebulizer with albuterol. Worked much better than inhaler alone. She continues to take advair and ventolin. CXR if symptoms do not improve or if they worsen. If shortness of breath worsens at all, I recommend ER evaluation. She has history of pneumonia in the past.     She continues to wear oxygen at night at [facility]. This has been helpful in reducing nocturnal shortness of breath and daytime somnolence. Instructions given:  The patient is instructed to take Probiotic tablets twice a day for the duration of antibiotic therapy and for 4 days after completion of antibiotics. This will help restore the good bacteria to your colon and prevent side effects of antibiotic therapy such as cramping and diarrhea. Probiotic tablets can be found at your local pharmacy over the counter. Ask your pharmacist if you need help finding tablets.        Electronically signed by Neymar Mendoza [id number], 2:45 PM 7/19/18

## 2018-08-23 PROBLEM — G47.34 NOCTURNAL HYPOXEMIA: Status: ACTIVE | Noted: 2018-08-23

## 2018-08-28 ENCOUNTER — TELEPHONE (OUTPATIENT)
Dept: FAMILY MEDICINE CLINIC | Age: 78
End: 2018-08-28

## 2018-08-28 NOTE — TELEPHONE ENCOUNTER
More information needed. What testing was completed? Please follow up with Amy RE: what is needed. Please help with this patient who is seen by [facility] long term.

## 2018-08-31 ENCOUNTER — TELEPHONE (OUTPATIENT)
Dept: FAMILY MEDICINE CLINIC | Age: 78
End: 2018-08-31

## 2018-08-31 NOTE — TELEPHONE ENCOUNTER
Left VM for patient to call the office to schedule an appointment within the next 30 days per Krista Crystal for medical necessity. The neccessary forms have been completed, faxed to Neil Chester from Krista Crystal and scanned to media. Patient should keep scheduled appointment for 10/26/2018 a 6 month f/u.

## 2018-09-07 DIAGNOSIS — M62.838 MUSCLE SPASM: ICD-10-CM

## 2018-09-07 NOTE — TELEPHONE ENCOUNTER
Agustín is requesting medication refill.  Please approve or deny this request.    Rx requested:  Requested Prescriptions     Pending Prescriptions Disp Refills    baclofen (LIORESAL) 10 MG tablet [Pharmacy Med Name: BACLOFEN 10MG       TAB] 90 tablet 2     Sig: Take 1 tablet by mouth 3 times daily       Last Office Visit:   7/19/2018    Next Visit Date:  Future Appointments  Date Time Provider Dulce Brunson   9/25/2018 11:10 AM [facility]   10/26/2018 2:40 PM [facility]

## 2018-09-10 RX ORDER — BACLOFEN 10 MG/1
10 TABLET ORAL 3 TIMES DAILY
Qty: 90 TABLET | Refills: 2 | Status: SHIPPED | OUTPATIENT
Start: 2018-09-10 | End: 2019-03-11 | Stop reason: SDUPTHER

## 2018-09-18 ENCOUNTER — TELEPHONE (OUTPATIENT)
Dept: FAMILY MEDICINE CLINIC | Age: 78
End: 2018-09-18

## 2018-09-20 NOTE — TELEPHONE ENCOUNTER
I called patient to let her know that she can come in at 10:30 for an appt for the oxygen evaluation but she said she could not make it. cp

## 2018-09-25 ENCOUNTER — OFFICE VISIT (OUTPATIENT)
Dept: FAMILY MEDICINE CLINIC | Age: 78
End: 2018-09-25
Payer: MEDICARE

## 2018-09-25 VITALS
OXYGEN SATURATION: 96 % | RESPIRATION RATE: 20 BRPM | HEIGHT: 59 IN | BODY MASS INDEX: 44.47 KG/M2 | SYSTOLIC BLOOD PRESSURE: 138 MMHG | WEIGHT: 220.6 LBS | DIASTOLIC BLOOD PRESSURE: 70 MMHG | HEART RATE: 64 BPM | TEMPERATURE: 97.6 F

## 2018-09-25 DIAGNOSIS — G25.81 RESTLESS LEG SYNDROME: ICD-10-CM

## 2018-09-25 DIAGNOSIS — R06.02 SHORTNESS OF BREATH: ICD-10-CM

## 2018-09-25 DIAGNOSIS — Z87.891 FORMER SMOKER: ICD-10-CM

## 2018-09-25 DIAGNOSIS — Z23 NEED FOR VACCINATION: ICD-10-CM

## 2018-09-25 DIAGNOSIS — Z23 NEED FOR SHINGLES VACCINE: ICD-10-CM

## 2018-09-25 DIAGNOSIS — J45.40 MODERATE PERSISTENT ASTHMA WITHOUT COMPLICATION: ICD-10-CM

## 2018-09-25 DIAGNOSIS — G47.34 NOCTURNAL HYPOXEMIA: Primary | ICD-10-CM

## 2018-09-25 DIAGNOSIS — I10 ESSENTIAL HYPERTENSION: ICD-10-CM

## 2018-09-25 DIAGNOSIS — M79.7 FIBROMYALGIA: ICD-10-CM

## 2018-09-25 PROCEDURE — 4040F PNEUMOC VAC/ADMIN/RCVD: CPT | Performed by: NURSE PRACTITIONER

## 2018-09-25 PROCEDURE — 99214 OFFICE O/P EST MOD 30 MIN: CPT | Performed by: NURSE PRACTITIONER

## 2018-09-25 PROCEDURE — G8417 CALC BMI ABV UP PARAM F/U: HCPCS | Performed by: NURSE PRACTITIONER

## 2018-09-25 PROCEDURE — G0008 ADMIN INFLUENZA VIRUS VAC: HCPCS | Performed by: NURSE PRACTITIONER

## 2018-09-25 PROCEDURE — 90662 IIV NO PRSV INCREASED AG IM: CPT | Performed by: NURSE PRACTITIONER

## 2018-09-25 PROCEDURE — 1036F TOBACCO NON-USER: CPT | Performed by: NURSE PRACTITIONER

## 2018-09-25 PROCEDURE — 1123F ACP DISCUSS/DSCN MKR DOCD: CPT | Performed by: NURSE PRACTITIONER

## 2018-09-25 PROCEDURE — G8399 PT W/DXA RESULTS DOCUMENT: HCPCS | Performed by: NURSE PRACTITIONER

## 2018-09-25 PROCEDURE — G8427 DOCREV CUR MEDS BY ELIG CLIN: HCPCS | Performed by: NURSE PRACTITIONER

## 2018-09-25 PROCEDURE — 1101F PT FALLS ASSESS-DOCD LE1/YR: CPT | Performed by: NURSE PRACTITIONER

## 2018-09-25 PROCEDURE — 1090F PRES/ABSN URINE INCON ASSESS: CPT | Performed by: NURSE PRACTITIONER

## 2018-09-25 RX ORDER — ROPINIROLE 0.5 MG/1
TABLET, FILM COATED ORAL
Qty: 90 TABLET | Refills: 2 | Status: SHIPPED | OUTPATIENT
Start: 2018-09-25 | End: 2019-02-01 | Stop reason: SDUPTHER

## 2018-09-25 NOTE — PROGRESS NOTES
recombinant adjuvanted vaccine (SHINGRIX) 50 MCG SUSR injection   6. Moderate persistent asthma without complication     7. Fibromyalgia     8. Essential hypertension     9. Need for vaccination  INFLUENZA, HIGH DOSE, 65 YRS +, IM, PF, PREFILL SYR, 0.5ML (FLUZONE HD)       PLAN: Include orders in the DX section. Follow up: 1 month and as needed. Blood work one week prior as ordered. Recommend PFT. Possible obstructive disease with smoking history. Order given. Patient has known significant oxygen desaturation during sleep to the 70's. Symptoms have improved significantly since starting oxygen at night. Much less RLS and night time awakening. Flu shot discussed and given.        Electronically signed by Tessie Adamson, 11:40 AM 9/25/18

## 2018-10-08 ENCOUNTER — HOSPITAL ENCOUNTER (OUTPATIENT)
Dept: GENERAL RADIOLOGY | Age: 78
Discharge: HOME | End: 2018-10-10
Payer: MEDICARE

## 2018-10-08 ENCOUNTER — HOSPITAL ENCOUNTER (OUTPATIENT)
Dept: PULMONOLOGY | Age: 78
Discharge: HOME | End: 2018-10-08
Payer: MEDICARE

## 2018-10-08 DIAGNOSIS — G47.34 NOCTURNAL HYPOXEMIA: ICD-10-CM

## 2018-10-08 DIAGNOSIS — R06.02 SHORTNESS OF BREATH: ICD-10-CM

## 2018-10-08 DIAGNOSIS — R06.2 WHEEZE: ICD-10-CM

## 2018-10-08 DIAGNOSIS — J20.9 ACUTE BRONCHITIS, UNSPECIFIED ORGANISM: ICD-10-CM

## 2018-10-08 DIAGNOSIS — Z87.891 FORMER SMOKER: ICD-10-CM

## 2018-10-08 PROCEDURE — 94729 DIFFUSING CAPACITY: CPT

## 2018-10-08 PROCEDURE — 94060 EVALUATION OF WHEEZING: CPT | Performed by: INTERNAL MEDICINE

## 2018-10-08 PROCEDURE — 71046 X-RAY EXAM CHEST 2 VIEWS: CPT

## 2018-10-08 PROCEDURE — 94726 PLETHYSMOGRAPHY LUNG VOLUMES: CPT

## 2018-10-08 PROCEDURE — 6360000002 HC RX W HCPCS: Performed by: NURSE PRACTITIONER

## 2018-10-08 PROCEDURE — 94060 EVALUATION OF WHEEZING: CPT

## 2018-10-08 PROCEDURE — 94729 DIFFUSING CAPACITY: CPT | Performed by: INTERNAL MEDICINE

## 2018-10-08 PROCEDURE — 94726 PLETHYSMOGRAPHY LUNG VOLUMES: CPT | Performed by: INTERNAL MEDICINE

## 2018-10-08 RX ORDER — ALBUTEROL SULFATE 2.5 MG/3ML
2.5 SOLUTION RESPIRATORY (INHALATION) ONCE
Status: COMPLETED | OUTPATIENT
Start: 2018-10-08 | End: 2018-10-08

## 2018-10-08 RX ADMIN — ALBUTEROL SULFATE 2.5 MG: 2.5 SOLUTION RESPIRATORY (INHALATION) at 14:48

## 2018-10-11 PROBLEM — J43.8 OTHER EMPHYSEMA (HCC): Status: ACTIVE | Noted: 2018-10-11

## 2018-10-24 ENCOUNTER — TELEPHONE (OUTPATIENT)
Dept: FAMILY MEDICINE CLINIC | Age: 78
End: 2018-10-24

## 2018-11-27 ENCOUNTER — CARE COORDINATION (OUTPATIENT)
Dept: CASE MANAGEMENT | Age: 78
End: 2018-11-27

## 2018-12-06 ENCOUNTER — CARE COORDINATION (OUTPATIENT)
Dept: CARE COORDINATION | Age: 78
End: 2018-12-06

## 2018-12-13 ENCOUNTER — CARE COORDINATION (OUTPATIENT)
Dept: CARE COORDINATION | Age: 78
End: 2018-12-13

## 2019-01-10 ENCOUNTER — TELEPHONE (OUTPATIENT)
Dept: FAMILY MEDICINE CLINIC | Age: 79
End: 2019-01-10

## 2019-01-11 ENCOUNTER — OFFICE VISIT (OUTPATIENT)
Dept: FAMILY MEDICINE CLINIC | Age: 79
End: 2019-01-11
Payer: MEDICARE

## 2019-01-11 VITALS
TEMPERATURE: 97.8 F | WEIGHT: 235 LBS | RESPIRATION RATE: 12 BRPM | HEIGHT: 59 IN | SYSTOLIC BLOOD PRESSURE: 128 MMHG | BODY MASS INDEX: 47.37 KG/M2 | OXYGEN SATURATION: 90 % | DIASTOLIC BLOOD PRESSURE: 68 MMHG | HEART RATE: 81 BPM

## 2019-01-11 DIAGNOSIS — E66.01 MORBID OBESITY WITH BMI OF 45.0-49.9, ADULT (HCC): ICD-10-CM

## 2019-01-11 DIAGNOSIS — R09.02 HYPOXIA: Primary | ICD-10-CM

## 2019-01-11 DIAGNOSIS — R60.0 LOWER EXTREMITY EDEMA: ICD-10-CM

## 2019-01-11 DIAGNOSIS — J43.8 OTHER EMPHYSEMA (HCC): ICD-10-CM

## 2019-01-11 PROCEDURE — G8926 SPIRO NO PERF OR DOC: HCPCS | Performed by: FAMILY MEDICINE

## 2019-01-11 PROCEDURE — 1036F TOBACCO NON-USER: CPT | Performed by: FAMILY MEDICINE

## 2019-01-11 PROCEDURE — G8399 PT W/DXA RESULTS DOCUMENT: HCPCS | Performed by: FAMILY MEDICINE

## 2019-01-11 PROCEDURE — 1101F PT FALLS ASSESS-DOCD LE1/YR: CPT | Performed by: FAMILY MEDICINE

## 2019-01-11 PROCEDURE — 1090F PRES/ABSN URINE INCON ASSESS: CPT | Performed by: FAMILY MEDICINE

## 2019-01-11 PROCEDURE — 99215 OFFICE O/P EST HI 40 MIN: CPT | Performed by: FAMILY MEDICINE

## 2019-01-11 PROCEDURE — G8482 FLU IMMUNIZE ORDER/ADMIN: HCPCS | Performed by: FAMILY MEDICINE

## 2019-01-11 PROCEDURE — G8427 DOCREV CUR MEDS BY ELIG CLIN: HCPCS | Performed by: FAMILY MEDICINE

## 2019-01-11 PROCEDURE — 4040F PNEUMOC VAC/ADMIN/RCVD: CPT | Performed by: FAMILY MEDICINE

## 2019-01-11 PROCEDURE — 1123F ACP DISCUSS/DSCN MKR DOCD: CPT | Performed by: FAMILY MEDICINE

## 2019-01-11 PROCEDURE — G8417 CALC BMI ABV UP PARAM F/U: HCPCS | Performed by: FAMILY MEDICINE

## 2019-01-11 PROCEDURE — 3023F SPIROM DOC REV: CPT | Performed by: FAMILY MEDICINE

## 2019-01-11 ASSESSMENT — PATIENT HEALTH QUESTIONNAIRE - PHQ9
SUM OF ALL RESPONSES TO PHQ QUESTIONS 1-9: 0
2. FEELING DOWN, DEPRESSED OR HOPELESS: 0
SUM OF ALL RESPONSES TO PHQ9 QUESTIONS 1 & 2: 0
SUM OF ALL RESPONSES TO PHQ QUESTIONS 1-9: 0
1. LITTLE INTEREST OR PLEASURE IN DOING THINGS: 0

## 2019-01-11 ASSESSMENT — ENCOUNTER SYMPTOMS
WHEEZING: 0
COUGH: 0
ABDOMINAL PAIN: 0
VOMITING: 0
DIARRHEA: 0
APNEA: 0
STRIDOR: 0
NAUSEA: 0
CHEST TIGHTNESS: 0
SHORTNESS OF BREATH: 1
CONSTIPATION: 0
BLOOD IN STOOL: 0

## 2019-01-14 ENCOUNTER — TELEPHONE (OUTPATIENT)
Dept: FAMILY MEDICINE CLINIC | Age: 79
End: 2019-01-14

## 2019-01-14 PROBLEM — R60.0 BILATERAL LOWER EXTREMITY EDEMA: Status: ACTIVE | Noted: 2019-01-14

## 2019-01-16 ENCOUNTER — OFFICE VISIT (OUTPATIENT)
Dept: CARDIOLOGY CLINIC | Age: 79
End: 2019-01-16
Payer: MEDICARE

## 2019-01-16 VITALS
WEIGHT: 235 LBS | OXYGEN SATURATION: 97 % | HEART RATE: 77 BPM | HEIGHT: 59 IN | SYSTOLIC BLOOD PRESSURE: 124 MMHG | BODY MASS INDEX: 47.37 KG/M2 | DIASTOLIC BLOOD PRESSURE: 78 MMHG

## 2019-01-16 DIAGNOSIS — I10 ESSENTIAL HYPERTENSION: Primary | ICD-10-CM

## 2019-01-16 DIAGNOSIS — I20.9 ANGINA PECTORIS (HCC): ICD-10-CM

## 2019-01-16 DIAGNOSIS — I50.9 CONGESTIVE HEART FAILURE, UNSPECIFIED HF CHRONICITY, UNSPECIFIED HEART FAILURE TYPE (HCC): ICD-10-CM

## 2019-01-16 DIAGNOSIS — R60.0 BILATERAL LOWER EXTREMITY EDEMA: ICD-10-CM

## 2019-01-16 PROCEDURE — 1101F PT FALLS ASSESS-DOCD LE1/YR: CPT | Performed by: INTERNAL MEDICINE

## 2019-01-16 PROCEDURE — G8417 CALC BMI ABV UP PARAM F/U: HCPCS | Performed by: INTERNAL MEDICINE

## 2019-01-16 PROCEDURE — G8427 DOCREV CUR MEDS BY ELIG CLIN: HCPCS | Performed by: INTERNAL MEDICINE

## 2019-01-16 PROCEDURE — 1090F PRES/ABSN URINE INCON ASSESS: CPT | Performed by: INTERNAL MEDICINE

## 2019-01-16 PROCEDURE — 99204 OFFICE O/P NEW MOD 45 MIN: CPT | Performed by: INTERNAL MEDICINE

## 2019-01-16 PROCEDURE — G8482 FLU IMMUNIZE ORDER/ADMIN: HCPCS | Performed by: INTERNAL MEDICINE

## 2019-01-16 RX ORDER — OLMESARTAN MEDOXOMIL AND HYDROCHLOROTHIAZIDE 40/25 40; 25 MG/1; MG/1
1 TABLET ORAL DAILY
Qty: 90 TABLET | Refills: 3 | Status: SHIPPED | OUTPATIENT
Start: 2019-01-16 | End: 2020-02-13

## 2019-01-16 ASSESSMENT — ENCOUNTER SYMPTOMS
SHORTNESS OF BREATH: 0
WHEEZING: 0
TROUBLE SWALLOWING: 0
CHEST TIGHTNESS: 0
VOICE CHANGE: 0
BLOOD IN STOOL: 0
APNEA: 0
ANAL BLEEDING: 0
COLOR CHANGE: 0
ABDOMINAL DISTENTION: 0
FACIAL SWELLING: 0
VOMITING: 0
NAUSEA: 0
DIARRHEA: 0

## 2019-01-26 DIAGNOSIS — E78.5 DYSLIPIDEMIA: ICD-10-CM

## 2019-01-28 RX ORDER — ATORVASTATIN CALCIUM 40 MG/1
TABLET, FILM COATED ORAL
Qty: 90 TABLET | Refills: 1 | Status: SHIPPED | OUTPATIENT
Start: 2019-01-28 | End: 2019-05-06 | Stop reason: SDUPTHER

## 2019-01-29 ENCOUNTER — HOSPITAL ENCOUNTER (OUTPATIENT)
Dept: NON INVASIVE DIAGNOSTICS | Age: 79
Discharge: HOME | End: 2019-01-29
Payer: MEDICARE

## 2019-01-29 DIAGNOSIS — R60.0 LOWER EXTREMITY EDEMA: ICD-10-CM

## 2019-01-29 DIAGNOSIS — R09.02 HYPOXIA: ICD-10-CM

## 2019-01-29 LAB
LV EF: 55 %
LVEF MODALITY: NORMAL

## 2019-01-29 PROCEDURE — 93306 TTE W/DOPPLER COMPLETE: CPT

## 2019-02-01 DIAGNOSIS — G25.81 RESTLESS LEG SYNDROME: ICD-10-CM

## 2019-02-01 RX ORDER — ROPINIROLE 0.5 MG/1
TABLET, FILM COATED ORAL
Qty: 90 TABLET | Refills: 2 | Status: SHIPPED | OUTPATIENT
Start: 2019-02-01 | End: 2019-06-24 | Stop reason: SDUPTHER

## 2019-02-12 ENCOUNTER — HOSPITAL ENCOUNTER (OUTPATIENT)
Dept: NON INVASIVE DIAGNOSTICS | Age: 79
Discharge: HOME | End: 2019-02-12
Payer: MEDICARE

## 2019-02-12 ENCOUNTER — HOSPITAL ENCOUNTER (OUTPATIENT)
Dept: NUCLEAR MEDICINE | Age: 79
Discharge: HOME | End: 2019-02-14
Payer: MEDICARE

## 2019-02-12 VITALS — HEART RATE: 65 BPM | SYSTOLIC BLOOD PRESSURE: 142 MMHG | DIASTOLIC BLOOD PRESSURE: 80 MMHG

## 2019-02-12 DIAGNOSIS — I50.9 CONGESTIVE HEART FAILURE, UNSPECIFIED HF CHRONICITY, UNSPECIFIED HEART FAILURE TYPE (HCC): ICD-10-CM

## 2019-02-12 DIAGNOSIS — I20.9 ANGINA PECTORIS (HCC): ICD-10-CM

## 2019-02-12 DIAGNOSIS — I10 ESSENTIAL HYPERTENSION: ICD-10-CM

## 2019-02-12 PROCEDURE — 2580000003 HC RX 258: Performed by: INTERNAL MEDICINE

## 2019-02-12 PROCEDURE — A9502 TC99M TETROFOSMIN: HCPCS | Performed by: INTERNAL MEDICINE

## 2019-02-12 PROCEDURE — 3430000000 HC RX DIAGNOSTIC RADIOPHARMACEUTICAL: Performed by: INTERNAL MEDICINE

## 2019-02-12 PROCEDURE — 93017 CV STRESS TEST TRACING ONLY: CPT

## 2019-02-12 PROCEDURE — 6360000002 HC RX W HCPCS: Performed by: INTERNAL MEDICINE

## 2019-02-12 PROCEDURE — 78452 HT MUSCLE IMAGE SPECT MULT: CPT

## 2019-02-12 RX ORDER — SODIUM CHLORIDE 0.9 % (FLUSH) 0.9 %
10 SYRINGE (ML) INJECTION PRN
Status: DISCONTINUED | OUTPATIENT
Start: 2019-02-12 | End: 2019-02-15 | Stop reason: HOSPADM

## 2019-02-12 RX ADMIN — TETROFOSMIN 11.2 MILLICURIE: 1.38 INJECTION, POWDER, LYOPHILIZED, FOR SOLUTION INTRAVENOUS at 07:32

## 2019-02-12 RX ADMIN — REGADENOSON 0.4 MG: 0.08 INJECTION, SOLUTION INTRAVENOUS at 09:09

## 2019-02-12 RX ADMIN — TETROFOSMIN 33 MILLICURIE: 1.38 INJECTION, POWDER, LYOPHILIZED, FOR SOLUTION INTRAVENOUS at 09:11

## 2019-02-12 RX ADMIN — Medication 10 ML: at 07:33

## 2019-02-12 RX ADMIN — Medication 10 ML: at 09:10

## 2019-02-12 RX ADMIN — Medication 10 ML: at 09:12

## 2019-02-13 DIAGNOSIS — F41.1 GENERALIZED ANXIETY DISORDER: ICD-10-CM

## 2019-02-13 PROCEDURE — 78452 HT MUSCLE IMAGE SPECT MULT: CPT | Performed by: INTERNAL MEDICINE

## 2019-02-13 PROCEDURE — 93018 CV STRESS TEST I&R ONLY: CPT | Performed by: INTERNAL MEDICINE

## 2019-02-13 PROCEDURE — 93016 CV STRESS TEST SUPVJ ONLY: CPT | Performed by: INTERNAL MEDICINE

## 2019-02-25 DIAGNOSIS — R60.0 BILATERAL EDEMA OF LOWER EXTREMITY: ICD-10-CM

## 2019-02-25 RX ORDER — FUROSEMIDE 20 MG/1
20 TABLET ORAL SEE ADMIN INSTRUCTIONS
Qty: 30 TABLET | Refills: 0 | Status: SHIPPED | OUTPATIENT
Start: 2019-02-25 | End: 2019-08-26 | Stop reason: ALTCHOICE

## 2019-03-06 ENCOUNTER — OFFICE VISIT (OUTPATIENT)
Dept: CARDIOLOGY CLINIC | Age: 79
End: 2019-03-06
Payer: MEDICARE

## 2019-03-06 VITALS
RESPIRATION RATE: 22 BRPM | BODY MASS INDEX: 45.48 KG/M2 | DIASTOLIC BLOOD PRESSURE: 84 MMHG | SYSTOLIC BLOOD PRESSURE: 122 MMHG | HEART RATE: 66 BPM | WEIGHT: 225.6 LBS | OXYGEN SATURATION: 95 % | HEIGHT: 59 IN

## 2019-03-06 DIAGNOSIS — I50.9 CONGESTIVE HEART FAILURE, UNSPECIFIED HF CHRONICITY, UNSPECIFIED HEART FAILURE TYPE (HCC): ICD-10-CM

## 2019-03-06 DIAGNOSIS — I10 ESSENTIAL HYPERTENSION: Primary | ICD-10-CM

## 2019-03-06 DIAGNOSIS — I20.9 ANGINA PECTORIS (HCC): ICD-10-CM

## 2019-03-06 DIAGNOSIS — Z87.891 FORMER SMOKER: ICD-10-CM

## 2019-03-06 PROCEDURE — G8482 FLU IMMUNIZE ORDER/ADMIN: HCPCS | Performed by: INTERNAL MEDICINE

## 2019-03-06 PROCEDURE — 4040F PNEUMOC VAC/ADMIN/RCVD: CPT | Performed by: INTERNAL MEDICINE

## 2019-03-06 PROCEDURE — 1036F TOBACCO NON-USER: CPT | Performed by: INTERNAL MEDICINE

## 2019-03-06 PROCEDURE — G8417 CALC BMI ABV UP PARAM F/U: HCPCS | Performed by: INTERNAL MEDICINE

## 2019-03-06 PROCEDURE — G8399 PT W/DXA RESULTS DOCUMENT: HCPCS | Performed by: INTERNAL MEDICINE

## 2019-03-06 PROCEDURE — 1123F ACP DISCUSS/DSCN MKR DOCD: CPT | Performed by: INTERNAL MEDICINE

## 2019-03-06 PROCEDURE — G8599 NO ASA/ANTIPLAT THER USE RNG: HCPCS | Performed by: INTERNAL MEDICINE

## 2019-03-06 PROCEDURE — 1090F PRES/ABSN URINE INCON ASSESS: CPT | Performed by: INTERNAL MEDICINE

## 2019-03-06 PROCEDURE — 99214 OFFICE O/P EST MOD 30 MIN: CPT | Performed by: INTERNAL MEDICINE

## 2019-03-06 PROCEDURE — G8427 DOCREV CUR MEDS BY ELIG CLIN: HCPCS | Performed by: INTERNAL MEDICINE

## 2019-03-06 PROCEDURE — 1101F PT FALLS ASSESS-DOCD LE1/YR: CPT | Performed by: INTERNAL MEDICINE

## 2019-03-06 ASSESSMENT — ENCOUNTER SYMPTOMS
VOMITING: 0
APNEA: 0
NAUSEA: 0
DIARRHEA: 0
SHORTNESS OF BREATH: 0
BLOOD IN STOOL: 0
CHEST TIGHTNESS: 0

## 2019-03-11 DIAGNOSIS — M62.838 MUSCLE SPASM: ICD-10-CM

## 2019-03-11 RX ORDER — BACLOFEN 10 MG/1
TABLET ORAL
Qty: 90 TABLET | Refills: 2 | Status: SHIPPED | OUTPATIENT
Start: 2019-03-11 | End: 2019-06-20 | Stop reason: SDUPTHER

## 2019-03-13 ASSESSMENT — ENCOUNTER SYMPTOMS
COLOR CHANGE: 0
FACIAL SWELLING: 0
VOICE CHANGE: 0
ANAL BLEEDING: 0
WHEEZING: 0
ABDOMINAL DISTENTION: 0
TROUBLE SWALLOWING: 0

## 2019-03-20 ENCOUNTER — OFFICE VISIT (OUTPATIENT)
Dept: PULMONOLOGY | Age: 79
End: 2019-03-20
Payer: MEDICARE

## 2019-03-20 VITALS
DIASTOLIC BLOOD PRESSURE: 78 MMHG | HEIGHT: 59 IN | OXYGEN SATURATION: 93 % | HEART RATE: 58 BPM | TEMPERATURE: 97.1 F | BODY MASS INDEX: 45.32 KG/M2 | WEIGHT: 224.8 LBS | RESPIRATION RATE: 16 BRPM | SYSTOLIC BLOOD PRESSURE: 122 MMHG

## 2019-03-20 DIAGNOSIS — G47.33 OBSTRUCTIVE SLEEP APNEA: ICD-10-CM

## 2019-03-20 DIAGNOSIS — G47.34 NOCTURNAL HYPOXIA: ICD-10-CM

## 2019-03-20 DIAGNOSIS — J44.9 ASTHMA WITH COPD (CHRONIC OBSTRUCTIVE PULMONARY DISEASE) (HCC): Primary | ICD-10-CM

## 2019-03-20 DIAGNOSIS — I27.20 PULMONARY HYPERTENSION, MODERATE TO SEVERE (HCC): ICD-10-CM

## 2019-03-20 DIAGNOSIS — J45.41 MODERATE PERSISTENT ASTHMA WITH ACUTE EXACERBATION: ICD-10-CM

## 2019-03-20 DIAGNOSIS — E66.3 OVERWEIGHT: ICD-10-CM

## 2019-03-20 PROCEDURE — 99204 OFFICE O/P NEW MOD 45 MIN: CPT | Performed by: INTERNAL MEDICINE

## 2019-03-20 PROCEDURE — G8417 CALC BMI ABV UP PARAM F/U: HCPCS | Performed by: INTERNAL MEDICINE

## 2019-03-20 PROCEDURE — 3023F SPIROM DOC REV: CPT | Performed by: INTERNAL MEDICINE

## 2019-03-20 PROCEDURE — 1101F PT FALLS ASSESS-DOCD LE1/YR: CPT | Performed by: INTERNAL MEDICINE

## 2019-03-20 PROCEDURE — 1036F TOBACCO NON-USER: CPT | Performed by: INTERNAL MEDICINE

## 2019-03-20 PROCEDURE — 4040F PNEUMOC VAC/ADMIN/RCVD: CPT | Performed by: INTERNAL MEDICINE

## 2019-03-20 PROCEDURE — G8599 NO ASA/ANTIPLAT THER USE RNG: HCPCS | Performed by: INTERNAL MEDICINE

## 2019-03-20 PROCEDURE — G8399 PT W/DXA RESULTS DOCUMENT: HCPCS | Performed by: INTERNAL MEDICINE

## 2019-03-20 PROCEDURE — 1090F PRES/ABSN URINE INCON ASSESS: CPT | Performed by: INTERNAL MEDICINE

## 2019-03-20 PROCEDURE — G8427 DOCREV CUR MEDS BY ELIG CLIN: HCPCS | Performed by: INTERNAL MEDICINE

## 2019-03-20 PROCEDURE — G8482 FLU IMMUNIZE ORDER/ADMIN: HCPCS | Performed by: INTERNAL MEDICINE

## 2019-03-20 PROCEDURE — G8926 SPIRO NO PERF OR DOC: HCPCS | Performed by: INTERNAL MEDICINE

## 2019-03-20 PROCEDURE — 1123F ACP DISCUSS/DSCN MKR DOCD: CPT | Performed by: INTERNAL MEDICINE

## 2019-03-20 RX ORDER — ALBUTEROL SULFATE 90 UG/1
2 AEROSOL, METERED RESPIRATORY (INHALATION) EVERY 6 HOURS PRN
Qty: 1 INHALER | Refills: 5 | Status: SHIPPED | OUTPATIENT
Start: 2019-03-20

## 2019-03-20 ASSESSMENT — ENCOUNTER SYMPTOMS
CHEST TIGHTNESS: 0
SINUS PRESSURE: 0
VOMITING: 0
ABDOMINAL PAIN: 0
SORE THROAT: 0
RHINORRHEA: 1
SHORTNESS OF BREATH: 1
COUGH: 1
WHEEZING: 1
DIARRHEA: 0
NAUSEA: 0

## 2019-04-24 ENCOUNTER — OFFICE VISIT (OUTPATIENT)
Dept: FAMILY MEDICINE CLINIC | Age: 79
End: 2019-04-24
Payer: MEDICARE

## 2019-04-24 VITALS
WEIGHT: 223.4 LBS | HEART RATE: 63 BPM | HEIGHT: 59 IN | OXYGEN SATURATION: 93 % | RESPIRATION RATE: 20 BRPM | BODY MASS INDEX: 45.04 KG/M2 | DIASTOLIC BLOOD PRESSURE: 70 MMHG | SYSTOLIC BLOOD PRESSURE: 138 MMHG | TEMPERATURE: 96.9 F

## 2019-04-24 DIAGNOSIS — G47.34 NOCTURNAL HYPOXEMIA: Primary | ICD-10-CM

## 2019-04-24 DIAGNOSIS — J43.8 OTHER EMPHYSEMA (HCC): ICD-10-CM

## 2019-04-24 PROCEDURE — G8599 NO ASA/ANTIPLAT THER USE RNG: HCPCS | Performed by: FAMILY MEDICINE

## 2019-04-24 PROCEDURE — G8417 CALC BMI ABV UP PARAM F/U: HCPCS | Performed by: FAMILY MEDICINE

## 2019-04-24 PROCEDURE — 1036F TOBACCO NON-USER: CPT | Performed by: FAMILY MEDICINE

## 2019-04-24 PROCEDURE — 99213 OFFICE O/P EST LOW 20 MIN: CPT | Performed by: FAMILY MEDICINE

## 2019-04-24 PROCEDURE — G8926 SPIRO NO PERF OR DOC: HCPCS | Performed by: FAMILY MEDICINE

## 2019-04-24 PROCEDURE — 1123F ACP DISCUSS/DSCN MKR DOCD: CPT | Performed by: FAMILY MEDICINE

## 2019-04-24 PROCEDURE — G8427 DOCREV CUR MEDS BY ELIG CLIN: HCPCS | Performed by: FAMILY MEDICINE

## 2019-04-24 PROCEDURE — 3023F SPIROM DOC REV: CPT | Performed by: FAMILY MEDICINE

## 2019-04-24 PROCEDURE — 1090F PRES/ABSN URINE INCON ASSESS: CPT | Performed by: FAMILY MEDICINE

## 2019-04-24 PROCEDURE — 4040F PNEUMOC VAC/ADMIN/RCVD: CPT | Performed by: FAMILY MEDICINE

## 2019-04-24 PROCEDURE — G8399 PT W/DXA RESULTS DOCUMENT: HCPCS | Performed by: FAMILY MEDICINE

## 2019-04-24 NOTE — PROGRESS NOTES
Take 1 tablet by mouth See Admin Instructions 30 tablet 0    sertraline (ZOLOFT) 50 MG tablet TAKE 1 TABLET BY MOUTH ONCE DAILY 30 tablet 5    rOPINIRole (REQUIP) 0.5 MG tablet TAKE ONE TABLET BY MOUTH THREE TIMES DAILY 90 tablet 2    atorvastatin (LIPITOR) 40 MG tablet TAKE 1 TABLET BY MOUTH ONCE DAILY 90 tablet 1    olmesartan-hydrochlorothiazide (BENICAR HCT) 40-25 MG per tablet Take 1 tablet by mouth daily 90 tablet 3    doxycycline hyclate (VIBRA-TABS) 100 MG tablet Take 1 tablet by mouth 2 times daily 20 tablet 0    amLODIPine-atorvastatatin (CADUET) 10-40 MG per tablet TAKE ONE TABLET BY MOUTH ONCE DAILY 90 tablet 1    potassium chloride (KLOR-CON M) 20 MEQ extended release tablet Take 1 tablet by mouth every 48 hours as needed (when taking lasix that day) 30 tablet 1    fluticasone (FLONASE) 50 MCG/ACT nasal spray 2 sprays by Nasal route daily 1 Bottle 3    Cetirizine HCl (ZYRTEC ALLERGY) 10 MG CAPS Take 10 mg by mouth daily 90 capsule 3    polyethyl glycol-propyl glycol 0.4-0.3 % (SYSTANE) 0.4-0.3 % ophthalmic solution 1 drop as needed for Dry Eyes       No current facility-administered medications on file prior to visit. Allergies:  Seasonal    Review of Systems   Constitutional: Negative for activity change, appetite change and fatigue. Respiratory: Positive for shortness of breath. Negative for apnea, cough and chest tightness. Cardiovascular: Negative for chest pain, palpitations and leg swelling. Gastrointestinal: Negative for abdominal pain, blood in stool, constipation, diarrhea, nausea and vomiting. Musculoskeletal: Negative for arthralgias. Neurological: Negative for seizures and headaches. Psychiatric/Behavioral: Negative for hallucinations and suicidal ideas.        Objective:   /70 (Site: Left Upper Arm, Position: Sitting, Cuff Size: Large Adult)   Pulse 63   Temp 96.9 °F (36.1 °C) (Temporal)   Resp 20   Ht 4' 11\" (1.499 m)   Wt 223 lb 6.4 oz (101.3 kg) SpO2 93%   BMI 45.12 kg/m²     Physical Exam   Constitutional: She is oriented to person, place, and time. She appears well-developed and well-nourished. No distress. HENT:   Head: Normocephalic and atraumatic. Eyes: Pupils are equal, round, and reactive to light. Conjunctivae and EOM are normal.   Neck: Normal range of motion. Cardiovascular: Normal rate, regular rhythm and normal heart sounds. Exam reveals no gallop and no friction rub. No murmur heard. Pulmonary/Chest: Effort normal and breath sounds normal. No respiratory distress. She has no wheezes. She has no rales. She exhibits no tenderness. Prolonged expiratory phase   Abdominal: Soft. Bowel sounds are normal.   Musculoskeletal: She exhibits edema (trace/+1 pitting edema laterally to mid shin). Neurological: She is alert and oriented to person, place, and time. Skin: Skin is warm and dry. She is not diaphoretic. Psychiatric: She has a normal mood and affect. Her behavior is normal. Judgment and thought content normal.   Nursing note and vitals reviewed. Assessment & Plan:     1. Nocturnal hypoxemia  Due to needed and past medical history we will recertify home oxygen    2. Other emphysema ([address]- moderate COPD- PFT 10/18  Due to needed and past medical history we will recertify home oxygen      Return if symptoms worsen or fail to improve.     Dominic Rey MD

## 2019-05-02 ASSESSMENT — ENCOUNTER SYMPTOMS
VOMITING: 0
CHEST TIGHTNESS: 0
DIARRHEA: 0
BLOOD IN STOOL: 0
APNEA: 0
COUGH: 0
CONSTIPATION: 0
NAUSEA: 0
ABDOMINAL PAIN: 0
SHORTNESS OF BREATH: 1

## 2019-05-06 DIAGNOSIS — E78.5 DYSLIPIDEMIA: ICD-10-CM

## 2019-05-06 RX ORDER — ATORVASTATIN CALCIUM 40 MG/1
40 TABLET, FILM COATED ORAL DAILY
Qty: 90 TABLET | Refills: 1 | Status: SHIPPED | OUTPATIENT
Start: 2019-05-06 | End: 2020-03-09

## 2019-06-13 ENCOUNTER — TELEPHONE (OUTPATIENT)
Dept: FAMILY MEDICINE CLINIC | Age: 79
End: 2019-06-13

## 2019-06-13 NOTE — TELEPHONE ENCOUNTER
Patient stated that she had contacted [employer] for her portable oxygen and they were supposed to fax us over some forms. They had said they needed a prescription from us.

## 2019-06-20 DIAGNOSIS — M62.838 MUSCLE SPASM: ICD-10-CM

## 2019-06-20 RX ORDER — BACLOFEN 10 MG/1
TABLET ORAL
Qty: 90 TABLET | Refills: 2 | Status: SHIPPED | OUTPATIENT
Start: 2019-06-20 | End: 2019-10-02 | Stop reason: SDUPTHER

## 2019-06-24 ENCOUNTER — OFFICE VISIT (OUTPATIENT)
Dept: FAMILY MEDICINE CLINIC | Age: 79
End: 2019-06-24
Payer: MEDICARE

## 2019-06-24 VITALS
HEART RATE: 88 BPM | HEIGHT: 59 IN | SYSTOLIC BLOOD PRESSURE: 124 MMHG | DIASTOLIC BLOOD PRESSURE: 82 MMHG | OXYGEN SATURATION: 93 % | RESPIRATION RATE: 15 BRPM | WEIGHT: 223.6 LBS | BODY MASS INDEX: 45.08 KG/M2 | TEMPERATURE: 97.6 F

## 2019-06-24 DIAGNOSIS — I10 ESSENTIAL HYPERTENSION: ICD-10-CM

## 2019-06-24 DIAGNOSIS — G25.81 RESTLESS LEG SYNDROME: ICD-10-CM

## 2019-06-24 DIAGNOSIS — J30.89 SEASONAL ALLERGIC RHINITIS DUE TO OTHER ALLERGIC TRIGGER: ICD-10-CM

## 2019-06-24 DIAGNOSIS — J45.41 MODERATE PERSISTENT ASTHMA WITH ACUTE EXACERBATION: ICD-10-CM

## 2019-06-24 DIAGNOSIS — J43.8 OTHER EMPHYSEMA (HCC): Primary | ICD-10-CM

## 2019-06-24 DIAGNOSIS — E78.5 DYSLIPIDEMIA: ICD-10-CM

## 2019-06-24 DIAGNOSIS — G47.34 NOCTURNAL HYPOXEMIA: ICD-10-CM

## 2019-06-24 PROCEDURE — G8399 PT W/DXA RESULTS DOCUMENT: HCPCS | Performed by: NURSE PRACTITIONER

## 2019-06-24 PROCEDURE — G8427 DOCREV CUR MEDS BY ELIG CLIN: HCPCS | Performed by: NURSE PRACTITIONER

## 2019-06-24 PROCEDURE — G8599 NO ASA/ANTIPLAT THER USE RNG: HCPCS | Performed by: NURSE PRACTITIONER

## 2019-06-24 PROCEDURE — 1036F TOBACCO NON-USER: CPT | Performed by: NURSE PRACTITIONER

## 2019-06-24 PROCEDURE — 4040F PNEUMOC VAC/ADMIN/RCVD: CPT | Performed by: NURSE PRACTITIONER

## 2019-06-24 PROCEDURE — 1090F PRES/ABSN URINE INCON ASSESS: CPT | Performed by: NURSE PRACTITIONER

## 2019-06-24 PROCEDURE — 3023F SPIROM DOC REV: CPT | Performed by: NURSE PRACTITIONER

## 2019-06-24 PROCEDURE — 99214 OFFICE O/P EST MOD 30 MIN: CPT | Performed by: NURSE PRACTITIONER

## 2019-06-24 PROCEDURE — 1123F ACP DISCUSS/DSCN MKR DOCD: CPT | Performed by: NURSE PRACTITIONER

## 2019-06-24 PROCEDURE — G8417 CALC BMI ABV UP PARAM F/U: HCPCS | Performed by: NURSE PRACTITIONER

## 2019-06-24 PROCEDURE — G8926 SPIRO NO PERF OR DOC: HCPCS | Performed by: NURSE PRACTITIONER

## 2019-06-24 RX ORDER — ROPINIROLE 1 MG/1
TABLET, FILM COATED ORAL
Qty: 90 TABLET | Refills: 2 | Status: SHIPPED | OUTPATIENT
Start: 2019-06-24 | End: 2019-11-19 | Stop reason: SDUPTHER

## 2019-06-24 NOTE — PROGRESS NOTES
COPD: She presents for evaluation and treatment of COPD. Symptoms include dyspnea on exertion, post nasal drip and wheezing. Associated symptoms include nonproductive cough. Appetite has been unchanged. Symptoms are exacerbated by any exercise and humid air. Symptoms are alleviated by medication(s) (albuterol and Advair). She does not have had any adverse reactions or side effects to medications. The last hospitalization occurred 6 months ago. She states that her pulse oximetry at night typically does not go above 93 or 94%. She wears 2 L of oxygen at night and not sure if she should be using more or if this is normal.  She does not typically feel short of breath at night. No difficulty breathing lying down. Recently established care with [address] [place] [address] pulmonology after most recent hospitalization. (Dr. Mauricio Chowdhury). Allergic rhinitis: She states that she alternates taking Zyrtec versus Benadryl and does not get tired with the medication. Has used nasal spray with some relief. States that she has tried Singulair in the past and this has not really done anything for her. RLS: She states that restless leg symptoms have been worse lately. She continues to use the Requip 3 times a day but is wondering if she could try a higher dose. Feels that her legs are always on easy and jerk without her control. She states that her father had this problem for several years as well. Symptoms are very bothersome to her. Keep her from relaxing a lot of the time. No claudication symptoms reported. No cool extremities or feet. HTN/dyslipidemia: She states that she continues to take medication as ordered and follows routinely with cardiology. Typically sees Dr. Lazaro Mae every 6 months. Had recent echocardiogram and was told that the right side of her heart is enlarged but she does not have congestive heart failure. Had recent stress test with no sign of blockage. No GI upset or myalgia with statin.        ROS: The patient reports no nausea or vomiting. There is no heartburn or indigestion. There is no diarrhea or constipation. No black, bloody, mucusy or tarry stool noticed. The patient reports no bloating and no change in appetite. The patient has no headache, vision or hearing changes. The patient reports no numbness, tingling or weakness in the arms or legs. There is no difficulty with speech or swallowing. EXAM:  Constitutional Blood pressure 124/82, pulse 88, temperature 97.6 °F (36.4 °C), temperature source Temporal, resp. rate 15, height 4' 11\" (1.499 m), weight 223 lb 9.6 oz (101.4 kg), SpO2 93 %, not currently breastfeeding. .  She has a normal affect, no acute distress, appears well developed and well nourished. Neck:  neck- supple, no mass, non-tender and no bruits  Lungs:  Normal expansion. Clear to auscultation. No rales, rhonchi, or wheezing., No chest wall tenderness. Heart:  Heart sounds are normal.  Regular rate and rhythm without murmur, gallop or rub. Abdomen:  Soft, non-tender, normal bowel sounds. No bruits, organomegaly or masses. Extremities: Extremities warm to touch, pink, with no edema. DIAGNOSIS:    Diagnosis Orders   1. Other emphysema ([address]- moderate COPD- PFT 10/18     2. Moderate persistent asthma with acute exacerbation  fluticasone-salmeterol (ADVAIR) 250-50 MCG/DOSE AEPB   3. Nocturnal hypoxemia     4. Seasonal allergic rhinitis due to other allergic trigger     5. Essential hypertension     6. Dyslipidemia  CBC Auto Differential    Comprehensive Metabolic Panel    Lipid Panel   7. Restless leg syndrome  rOPINIRole (REQUIP) 1 MG tablet       PLAN:  If smoking, need to quit. Use controller medicines regularly, not just as needed. Follow up routinely in 2 months or sooner if needed. AWV in October. 1. 2. 3. 4.  Breathing has been at baseline. She is now following routinely with pulmonology and is due for follow-up visit.   She is encouraged to discuss nighttime pulse oximetry of 94% on oxygen to see if any titration of oxygen is recommended. Discussed pathophysiology of COPD and asthma and O2 and CO2 .    5.  Blood pressure is well controlled on current medication. Continue the same. No side effects reported and with recent changes ordered by Dr. Aureliano Monroe, she has had no further swelling of her legs. 6.  Discussed recommendation for current lab with lipid level. She is not having side effects with statin. We will plan to follow-up and review labs in August.    7.  Requip dose increased to 1 mg 3 times per day. Would not recommend going higher in the dose than this. Can refer to neurology in the future if symptoms worsen or do not get better. Please note this report has been partially produced using speech recognition software and may cause contain errors related to that system including grammar, punctuation and spelling as well as words and phrases that may seem inappropriate. If there are questions or concerns please feel free to contact me to clarify.         Electronically signed by Tabby Estrella, 4:14 PM 6/24/19

## 2019-08-22 ENCOUNTER — HOSPITAL ENCOUNTER (OUTPATIENT)
Dept: LAB | Age: 79
Discharge: HOME | End: 2019-08-22
Payer: MEDICARE

## 2019-08-22 DIAGNOSIS — E78.5 DYSLIPIDEMIA: ICD-10-CM

## 2019-08-22 LAB
ALBUMIN SERPL-MCNC: 4.3 G/DL (ref 3.5–4.6)
ALP BLD-CCNC: 61 U/L (ref 40–130)
ALT SERPL-CCNC: 34 U/L (ref 0–33)
ANION GAP SERPL CALCULATED.3IONS-SCNC: 14 MEQ/L (ref 9–15)
AST SERPL-CCNC: 40 U/L (ref 0–35)
BASOPHILS ABSOLUTE: 0 K/UL (ref 0–0.2)
BASOPHILS RELATIVE PERCENT: 0.4 %
BILIRUB SERPL-MCNC: 0.5 MG/DL (ref 0.2–0.7)
BUN BLDV-MCNC: 18 MG/DL (ref 8–23)
CALCIUM SERPL-MCNC: 9.8 MG/DL (ref 8.5–9.9)
CHLORIDE BLD-SCNC: 101 MEQ/L (ref 95–107)
CHOLESTEROL, TOTAL: 110 MG/DL (ref 0–199)
CO2: 27 MEQ/L (ref 20–31)
CREAT SERPL-MCNC: 0.86 MG/DL (ref 0.5–0.9)
EOSINOPHILS ABSOLUTE: 0.1 K/UL (ref 0–0.7)
EOSINOPHILS RELATIVE PERCENT: 1.8 %
GFR AFRICAN AMERICAN: >60
GFR NON-AFRICAN AMERICAN: >60
GLOBULIN: 3.1 G/DL (ref 2.3–3.5)
GLUCOSE BLD-MCNC: 104 MG/DL (ref 70–99)
HCT VFR BLD CALC: 41.9 % (ref 37–47)
HDLC SERPL-MCNC: 37 MG/DL (ref 40–59)
HEMOGLOBIN: 14.3 G/DL (ref 12–16)
LDL CHOLESTEROL CALCULATED: 47 MG/DL (ref 0–129)
LYMPHOCYTES ABSOLUTE: 2.2 K/UL (ref 1–4.8)
LYMPHOCYTES RELATIVE PERCENT: 28.4 %
MCH RBC QN AUTO: 32.1 PG (ref 27–31.3)
MCHC RBC AUTO-ENTMCNC: 34.1 % (ref 33–37)
MCV RBC AUTO: 94.1 FL (ref 82–100)
MONOCYTES ABSOLUTE: 0.5 K/UL (ref 0.2–0.8)
MONOCYTES RELATIVE PERCENT: 6.4 %
NEUTROPHILS ABSOLUTE: 4.8 K/UL (ref 1.4–6.5)
NEUTROPHILS RELATIVE PERCENT: 63 %
PDW BLD-RTO: 14.9 % (ref 11.5–14.5)
PLATELET # BLD: 181 K/UL (ref 130–400)
POTASSIUM SERPL-SCNC: 4 MEQ/L (ref 3.4–4.9)
RBC # BLD: 4.46 M/UL (ref 4.2–5.4)
SODIUM BLD-SCNC: 142 MEQ/L (ref 135–144)
TOTAL PROTEIN: 7.4 G/DL (ref 6.3–8)
TRIGL SERPL-MCNC: 129 MG/DL (ref 0–150)
WBC # BLD: 7.6 K/UL (ref 4.8–10.8)

## 2019-08-22 PROCEDURE — 85025 COMPLETE CBC W/AUTO DIFF WBC: CPT

## 2019-08-22 PROCEDURE — 80061 LIPID PANEL: CPT

## 2019-08-22 PROCEDURE — 36415 COLL VENOUS BLD VENIPUNCTURE: CPT

## 2019-08-22 PROCEDURE — 80053 COMPREHEN METABOLIC PANEL: CPT

## 2019-08-26 ENCOUNTER — OFFICE VISIT (OUTPATIENT)
Dept: FAMILY MEDICINE CLINIC | Age: 79
End: 2019-08-26
Payer: MEDICARE

## 2019-08-26 VITALS
BODY MASS INDEX: 44.76 KG/M2 | TEMPERATURE: 97 F | HEART RATE: 58 BPM | SYSTOLIC BLOOD PRESSURE: 128 MMHG | OXYGEN SATURATION: 94 % | HEIGHT: 59 IN | WEIGHT: 222 LBS | DIASTOLIC BLOOD PRESSURE: 72 MMHG | RESPIRATION RATE: 16 BRPM

## 2019-08-26 DIAGNOSIS — J45.41 MODERATE PERSISTENT ASTHMA WITH ACUTE EXACERBATION: ICD-10-CM

## 2019-08-26 DIAGNOSIS — I10 ESSENTIAL HYPERTENSION: Primary | ICD-10-CM

## 2019-08-26 DIAGNOSIS — F41.1 GENERALIZED ANXIETY DISORDER: ICD-10-CM

## 2019-08-26 DIAGNOSIS — J30.89 SEASONAL ALLERGIC RHINITIS DUE TO OTHER ALLERGIC TRIGGER: ICD-10-CM

## 2019-08-26 DIAGNOSIS — J44.1 COPD EXACERBATION (HCC): ICD-10-CM

## 2019-08-26 DIAGNOSIS — J43.8 OTHER EMPHYSEMA (HCC): ICD-10-CM

## 2019-08-26 DIAGNOSIS — E78.5 DYSLIPIDEMIA: ICD-10-CM

## 2019-08-26 DIAGNOSIS — G25.81 RESTLESS LEG SYNDROME: ICD-10-CM

## 2019-08-26 DIAGNOSIS — R73.09 ELEVATED GLUCOSE: ICD-10-CM

## 2019-08-26 DIAGNOSIS — H65.93 FLUID LEVEL BEHIND TYMPANIC MEMBRANE OF BOTH EARS: ICD-10-CM

## 2019-08-26 DIAGNOSIS — J30.2 SEASONAL ALLERGIES: ICD-10-CM

## 2019-08-26 PROCEDURE — 4040F PNEUMOC VAC/ADMIN/RCVD: CPT | Performed by: NURSE PRACTITIONER

## 2019-08-26 PROCEDURE — G8417 CALC BMI ABV UP PARAM F/U: HCPCS | Performed by: NURSE PRACTITIONER

## 2019-08-26 PROCEDURE — G8926 SPIRO NO PERF OR DOC: HCPCS | Performed by: NURSE PRACTITIONER

## 2019-08-26 PROCEDURE — 3023F SPIROM DOC REV: CPT | Performed by: NURSE PRACTITIONER

## 2019-08-26 PROCEDURE — G8399 PT W/DXA RESULTS DOCUMENT: HCPCS | Performed by: NURSE PRACTITIONER

## 2019-08-26 PROCEDURE — 1123F ACP DISCUSS/DSCN MKR DOCD: CPT | Performed by: NURSE PRACTITIONER

## 2019-08-26 PROCEDURE — 1036F TOBACCO NON-USER: CPT | Performed by: NURSE PRACTITIONER

## 2019-08-26 PROCEDURE — 1090F PRES/ABSN URINE INCON ASSESS: CPT | Performed by: NURSE PRACTITIONER

## 2019-08-26 PROCEDURE — 99214 OFFICE O/P EST MOD 30 MIN: CPT | Performed by: NURSE PRACTITIONER

## 2019-08-26 PROCEDURE — G8427 DOCREV CUR MEDS BY ELIG CLIN: HCPCS | Performed by: NURSE PRACTITIONER

## 2019-08-26 PROCEDURE — G8599 NO ASA/ANTIPLAT THER USE RNG: HCPCS | Performed by: NURSE PRACTITIONER

## 2019-08-26 RX ORDER — AZITHROMYCIN 250 MG/1
TABLET, FILM COATED ORAL
Qty: 6 TABLET | Refills: 0 | Status: SHIPPED | OUTPATIENT
Start: 2019-08-26 | End: 2019-09-05

## 2019-08-26 RX ORDER — PREDNISONE 20 MG/1
TABLET ORAL
Qty: 20 TABLET | Refills: 0 | Status: SHIPPED | OUTPATIENT
Start: 2019-08-26 | End: 2019-11-22 | Stop reason: SDUPTHER

## 2019-08-26 ASSESSMENT — PATIENT HEALTH QUESTIONNAIRE - PHQ9
1. LITTLE INTEREST OR PLEASURE IN DOING THINGS: 0
SUM OF ALL RESPONSES TO PHQ9 QUESTIONS 1 & 2: 0
SUM OF ALL RESPONSES TO PHQ QUESTIONS 1-9: 0
2. FEELING DOWN, DEPRESSED OR HOPELESS: 0
SUM OF ALL RESPONSES TO PHQ QUESTIONS 1-9: 0

## 2019-09-06 DIAGNOSIS — F41.1 GENERALIZED ANXIETY DISORDER: ICD-10-CM

## 2019-09-06 NOTE — TELEPHONE ENCOUNTER
Pharmacy is requesting medication refill.  Please approve or deny this request.    Rx requested:  Requested Prescriptions     Pending Prescriptions Disp Refills    sertraline (ZOLOFT) 50 MG tablet [Pharmacy Med Name: SERTRALINE 50MG TAB] 90 tablet 1     Sig: Take 1 tablet by mouth daily         Last Office Visit:   8/26/2019      Next Visit Date:  Future Appointments   Date Time Provider Dulce Brunson   9/11/2019 12:15 PM Susie Sanderson MD [address]   10/14/2019  1:00 PM [facility]   2/28/2020  2:00 PM JUAN Read - CNP [address]

## 2019-09-11 ENCOUNTER — OFFICE VISIT (OUTPATIENT)
Dept: CARDIOLOGY CLINIC | Age: 79
End: 2019-09-11
Payer: MEDICARE

## 2019-09-11 VITALS
SYSTOLIC BLOOD PRESSURE: 124 MMHG | HEART RATE: 74 BPM | WEIGHT: 222.2 LBS | BODY MASS INDEX: 44.8 KG/M2 | RESPIRATION RATE: 20 BRPM | OXYGEN SATURATION: 94 % | HEIGHT: 59 IN | DIASTOLIC BLOOD PRESSURE: 84 MMHG

## 2019-09-11 DIAGNOSIS — I10 ESSENTIAL HYPERTENSION: ICD-10-CM

## 2019-09-11 DIAGNOSIS — Z87.891 FORMER SMOKER: ICD-10-CM

## 2019-09-11 DIAGNOSIS — I50.9 CONGESTIVE HEART FAILURE, UNSPECIFIED HF CHRONICITY, UNSPECIFIED HEART FAILURE TYPE (HCC): Primary | ICD-10-CM

## 2019-09-11 DIAGNOSIS — R60.0 BILATERAL LOWER EXTREMITY EDEMA: ICD-10-CM

## 2019-09-11 PROCEDURE — 1123F ACP DISCUSS/DSCN MKR DOCD: CPT | Performed by: INTERNAL MEDICINE

## 2019-09-11 PROCEDURE — 99213 OFFICE O/P EST LOW 20 MIN: CPT | Performed by: INTERNAL MEDICINE

## 2019-09-11 PROCEDURE — G8427 DOCREV CUR MEDS BY ELIG CLIN: HCPCS | Performed by: INTERNAL MEDICINE

## 2019-09-11 PROCEDURE — G8599 NO ASA/ANTIPLAT THER USE RNG: HCPCS | Performed by: INTERNAL MEDICINE

## 2019-09-11 PROCEDURE — 4040F PNEUMOC VAC/ADMIN/RCVD: CPT | Performed by: INTERNAL MEDICINE

## 2019-09-11 PROCEDURE — 1036F TOBACCO NON-USER: CPT | Performed by: INTERNAL MEDICINE

## 2019-09-11 PROCEDURE — G8399 PT W/DXA RESULTS DOCUMENT: HCPCS | Performed by: INTERNAL MEDICINE

## 2019-09-11 PROCEDURE — 1090F PRES/ABSN URINE INCON ASSESS: CPT | Performed by: INTERNAL MEDICINE

## 2019-09-11 PROCEDURE — G8417 CALC BMI ABV UP PARAM F/U: HCPCS | Performed by: INTERNAL MEDICINE

## 2019-09-11 ASSESSMENT — ENCOUNTER SYMPTOMS
DIARRHEA: 0
APNEA: 0
NAUSEA: 0
VOMITING: 0
BLOOD IN STOOL: 0
SHORTNESS OF BREATH: 0
CHEST TIGHTNESS: 0

## 2019-09-11 NOTE — PROGRESS NOTES
Generalized anxiety disorder     Hyperlipidemia     Hypertension     Hypertrophy of nasal turbinates     Other emphysema (HCC)- moderate COPD- PFT 10/18 10/11/2018    Restless leg syndrome 2016       Past Surgical History:   Procedure Laterality Date    APPENDECTOMY      BRONCHOSCOPY  2018    Bronchoscopy with Bronchial Biopsy [place]    CHOLECYSTECTOMY      COLONOSCOPY      ENDOSCOPY, COLON, DIAGNOSTIC      AK OFFICE/OUTPT VISIT,PROCEDURE ONLY N/A 2018    SEPTOPLASTY MICRODEBRIDER ASSISTED TURBINOPALSTY AND OUT-FRACTURING BILATERAL BALLOON SINUPLASTY OF MAXILLARY AND SPENOID SINUSES performed by Piyush Duvall MD at [facility]       Family History   Problem Relation Age of Onset    Allergy (Severe) Paternal Cousin    [facility] Cancer Mother         renal CA    Heart Disease Father     Heart Attack Father     Cancer Sister         Breast CA    No Known Problems Daughter     No Known Problems Daughter        Social History     Socioeconomic History    Marital status:       Spouse name: None    Number of children: None    Years of education: None    Highest education level: None   Occupational History    None   Social Needs    Financial resource strain: None    Food insecurity:     Worry: None     Inability: None    Transportation needs:     Medical: None     Non-medical: None   Tobacco Use    Smoking status: Former Smoker     Packs/day: 2.00     Years: 20.00     Pack years: 40.00     Types: Cigarettes     Last attempt to quit: 3/7/1990     Years since quittin.5    Smokeless tobacco: Never Used    Tobacco comment: quit 25 years ago   Substance and Sexual Activity    Alcohol use: Yes     Comment: occ    Drug use: No    Sexual activity: None   Lifestyle    Physical activity:     Days per week: None     Minutes per session: None    Stress: None   Relationships    Social connections:     Talks on phone: None     Gets together: None     Attends Congregational service: None Active member of club or organization: None     Attends meetings of clubs or organizations: None     Relationship status: None    Intimate partner violence:     Fear of current or ex partner: None     Emotionally abused: None     Physically abused: None     Forced sexual activity: None   Other Topics Concern    None   Social History Narrative    None       Allergies   Allergen Reactions    Seasonal      Grass, trees, dust, pollen. YEAR ROUND       Current Outpatient Medications   Medication Sig Dispense Refill    sertraline (ZOLOFT) 50 MG tablet Take 1 tablet by mouth daily 90 tablet 1    fluticasone-salmeterol (ADVAIR) 250-50 MCG/DOSE AEPB Inhale 1 puff into the lungs every 12 hours 1 Inhaler 5    Blood Pressure Monitoring (SPHYGMOMANOMETER) MISC 1 Device by Does not apply route daily 1 each 0    rOPINIRole (REQUIP) 1 MG tablet TAKE ONE TABLET BY MOUTH THREE TIMES DAILY 90 tablet 2    baclofen (LIORESAL) 10 MG tablet TAKE 1 TABLET BY MOUTH THREE TIMES DAILY 90 tablet 2    atorvastatin (LIPITOR) 40 MG tablet Take 1 tablet by mouth daily 90 tablet 1    albuterol sulfate HFA (VENTOLIN HFA) 108 (90 Base) MCG/ACT inhaler Inhale 2 puffs into the lungs every 6 hours as needed for Wheezing 1 Inhaler 5    olmesartan-hydrochlorothiazide (BENICAR HCT) 40-25 MG per tablet Take 1 tablet by mouth daily 90 tablet 3    fluticasone (FLONASE) 50 MCG/ACT nasal spray 2 sprays by Nasal route daily 1 Bottle 3    polyethyl glycol-propyl glycol 0.4-0.3 % (SYSTANE) 0.4-0.3 % ophthalmic solution 1 drop as needed for Dry Eyes       No current facility-administered medications for this visit. Review of Systems:   Review of Systems   Constitutional: Negative for appetite change, diaphoresis and fatigue. HENT: Negative for nosebleeds. Respiratory: Negative for apnea, chest tightness and shortness of breath. Cardiovascular: Negative for chest pain, palpitations and leg swelling.    Gastrointestinal: Negative for

## 2019-09-17 ASSESSMENT — ENCOUNTER SYMPTOMS: COLOR CHANGE: 0

## 2019-10-02 DIAGNOSIS — M62.838 MUSCLE SPASM: ICD-10-CM

## 2019-10-03 RX ORDER — BACLOFEN 10 MG/1
10 TABLET ORAL 3 TIMES DAILY
Qty: 90 TABLET | Refills: 2 | Status: SHIPPED | OUTPATIENT
Start: 2019-10-03 | End: 2020-01-20

## 2019-11-05 ENCOUNTER — NURSE ONLY (OUTPATIENT)
Dept: FAMILY MEDICINE CLINIC | Age: 79
End: 2019-11-05
Payer: MEDICARE

## 2019-11-05 DIAGNOSIS — Z23 FLU VACCINE NEED: Primary | ICD-10-CM

## 2019-11-05 PROCEDURE — G0008 ADMIN INFLUENZA VIRUS VAC: HCPCS | Performed by: NURSE PRACTITIONER

## 2019-11-05 PROCEDURE — 90653 IIV ADJUVANT VACCINE IM: CPT | Performed by: NURSE PRACTITIONER

## 2019-11-19 DIAGNOSIS — G25.81 RESTLESS LEG SYNDROME: ICD-10-CM

## 2019-11-19 RX ORDER — ROPINIROLE 1 MG/1
TABLET, FILM COATED ORAL
Qty: 90 TABLET | Refills: 0 | Status: SHIPPED | OUTPATIENT
Start: 2019-11-19 | End: 2020-01-27

## 2019-11-22 ENCOUNTER — OFFICE VISIT (OUTPATIENT)
Dept: FAMILY MEDICINE CLINIC | Age: 79
End: 2019-11-22
Payer: MEDICARE

## 2019-11-22 VITALS
BODY MASS INDEX: 45 KG/M2 | WEIGHT: 223.2 LBS | HEART RATE: 88 BPM | OXYGEN SATURATION: 97 % | DIASTOLIC BLOOD PRESSURE: 62 MMHG | TEMPERATURE: 97.8 F | HEIGHT: 59 IN | SYSTOLIC BLOOD PRESSURE: 140 MMHG | RESPIRATION RATE: 18 BRPM

## 2019-11-22 DIAGNOSIS — J44.0 COPD WITH ACUTE BRONCHITIS (HCC): Primary | ICD-10-CM

## 2019-11-22 DIAGNOSIS — J20.9 COPD WITH ACUTE BRONCHITIS (HCC): Primary | ICD-10-CM

## 2019-11-22 PROCEDURE — G8427 DOCREV CUR MEDS BY ELIG CLIN: HCPCS | Performed by: NURSE PRACTITIONER

## 2019-11-22 PROCEDURE — 4040F PNEUMOC VAC/ADMIN/RCVD: CPT | Performed by: NURSE PRACTITIONER

## 2019-11-22 PROCEDURE — G8599 NO ASA/ANTIPLAT THER USE RNG: HCPCS | Performed by: NURSE PRACTITIONER

## 2019-11-22 PROCEDURE — G8926 SPIRO NO PERF OR DOC: HCPCS | Performed by: NURSE PRACTITIONER

## 2019-11-22 PROCEDURE — 1036F TOBACCO NON-USER: CPT | Performed by: NURSE PRACTITIONER

## 2019-11-22 PROCEDURE — 1123F ACP DISCUSS/DSCN MKR DOCD: CPT | Performed by: NURSE PRACTITIONER

## 2019-11-22 PROCEDURE — G8482 FLU IMMUNIZE ORDER/ADMIN: HCPCS | Performed by: NURSE PRACTITIONER

## 2019-11-22 PROCEDURE — 99214 OFFICE O/P EST MOD 30 MIN: CPT | Performed by: NURSE PRACTITIONER

## 2019-11-22 PROCEDURE — 3023F SPIROM DOC REV: CPT | Performed by: NURSE PRACTITIONER

## 2019-11-22 PROCEDURE — G8417 CALC BMI ABV UP PARAM F/U: HCPCS | Performed by: NURSE PRACTITIONER

## 2019-11-22 PROCEDURE — G8399 PT W/DXA RESULTS DOCUMENT: HCPCS | Performed by: NURSE PRACTITIONER

## 2019-11-22 PROCEDURE — 1090F PRES/ABSN URINE INCON ASSESS: CPT | Performed by: NURSE PRACTITIONER

## 2019-11-22 RX ORDER — PREDNISONE 20 MG/1
TABLET ORAL
Qty: 20 TABLET | Refills: 0 | Status: SHIPPED | OUTPATIENT
Start: 2019-11-22 | End: 2019-12-02

## 2019-11-22 RX ORDER — DOXYCYCLINE HYCLATE 100 MG
100 TABLET ORAL 2 TIMES DAILY
Qty: 14 TABLET | Refills: 0 | Status: SHIPPED | OUTPATIENT
Start: 2019-11-22 | End: 2019-11-29

## 2019-11-22 ASSESSMENT — ENCOUNTER SYMPTOMS
RHINORRHEA: 0
SORE THROAT: 0
HOARSE VOICE: 1
DIARRHEA: 0
ABDOMINAL PAIN: 0
COUGH: 1
TROUBLE SWALLOWING: 0
HEMOPTYSIS: 0
SINUS PRESSURE: 1
NAUSEA: 0
SPUTUM PRODUCTION: 1
FACIAL SWELLING: 0
DIFFICULTY BREATHING: 0
WHEEZING: 1
HEARTBURN: 0
CHEST TIGHTNESS: 0
FREQUENT THROAT CLEARING: 0
SHORTNESS OF BREATH: 1
EYES NEGATIVE: 1
VOMITING: 0

## 2019-11-22 ASSESSMENT — COPD QUESTIONNAIRES: COPD: 1

## 2020-01-20 RX ORDER — BACLOFEN 10 MG/1
10 TABLET ORAL 3 TIMES DAILY
Qty: 90 TABLET | Refills: 0 | Status: SHIPPED | OUTPATIENT
Start: 2020-01-20 | End: 2020-01-24 | Stop reason: SDUPTHER

## 2020-01-20 NOTE — TELEPHONE ENCOUNTER
Pharmacy is requesting medication refill.  Please approve or deny this request.    Rx requested:  Requested Prescriptions     Pending Prescriptions Disp Refills    baclofen (LIORESAL) 10 MG tablet [Pharmacy Med Name: Baclofen 10 MG Oral Tablet] 90 tablet 0     Sig: Take 1 tablet by mouth 3 times daily         Last Office Visit:   8/26/2019      Next Visit Date:  Future Appointments   Date Time Provider Dulce Brunson   1/24/2020  3:40 PM JUAN Smith CNP dav[facility]   2/28/2020  2:00 PM JUAN Smith CNP dav[facility] 94   3/17/2020 11:15 AM Beti Good MD [address]

## 2020-01-24 ENCOUNTER — HOSPITAL ENCOUNTER (OUTPATIENT)
Dept: GENERAL RADIOLOGY | Age: 80
Discharge: HOME | End: 2020-01-26
Payer: MEDICARE

## 2020-01-24 ENCOUNTER — OFFICE VISIT (OUTPATIENT)
Dept: FAMILY MEDICINE CLINIC | Age: 80
End: 2020-01-24
Payer: MEDICARE

## 2020-01-24 ENCOUNTER — TELEPHONE (OUTPATIENT)
Dept: FAMILY MEDICINE CLINIC | Age: 80
End: 2020-01-24

## 2020-01-24 ENCOUNTER — HOSPITAL ENCOUNTER (OUTPATIENT)
Age: 80
Discharge: HOME | End: 2020-01-26
Payer: MEDICARE

## 2020-01-24 ENCOUNTER — HOSPITAL ENCOUNTER (OUTPATIENT)
Dept: LAB | Age: 80
Discharge: HOME | End: 2020-01-24
Payer: MEDICARE

## 2020-01-24 VITALS
OXYGEN SATURATION: 98 % | BODY MASS INDEX: 46.37 KG/M2 | DIASTOLIC BLOOD PRESSURE: 70 MMHG | RESPIRATION RATE: 18 BRPM | WEIGHT: 230 LBS | HEIGHT: 59 IN | HEART RATE: 69 BPM | TEMPERATURE: 97.8 F | SYSTOLIC BLOOD PRESSURE: 130 MMHG

## 2020-01-24 LAB
ALBUMIN SERPL-MCNC: 4.1 G/DL (ref 3.5–4.6)
ALP BLD-CCNC: 59 U/L (ref 40–130)
ALT SERPL-CCNC: 28 U/L (ref 0–33)
ANION GAP SERPL CALCULATED.3IONS-SCNC: 13 MEQ/L (ref 9–15)
AST SERPL-CCNC: 32 U/L (ref 0–35)
BASOPHILS ABSOLUTE: 0.1 K/UL (ref 0–0.2)
BASOPHILS RELATIVE PERCENT: 0.6 %
BILIRUB SERPL-MCNC: 0.7 MG/DL (ref 0.2–0.7)
BUN BLDV-MCNC: 20 MG/DL (ref 8–23)
CALCIUM SERPL-MCNC: 10 MG/DL (ref 8.5–9.9)
CHLORIDE BLD-SCNC: 103 MEQ/L (ref 95–107)
CO2: 27 MEQ/L (ref 20–31)
CREAT SERPL-MCNC: 0.73 MG/DL (ref 0.5–0.9)
EOSINOPHILS ABSOLUTE: 0.2 K/UL (ref 0–0.7)
EOSINOPHILS RELATIVE PERCENT: 1.9 %
GFR AFRICAN AMERICAN: >60
GFR NON-AFRICAN AMERICAN: >60
GLOBULIN: 2.9 G/DL (ref 2.3–3.5)
GLUCOSE BLD-MCNC: 97 MG/DL (ref 70–99)
HCT VFR BLD CALC: 36.9 % (ref 37–47)
HEMOGLOBIN: 12.1 G/DL (ref 12–16)
LYMPHOCYTES ABSOLUTE: 2 K/UL (ref 1–4.8)
LYMPHOCYTES RELATIVE PERCENT: 21.4 %
MCH RBC QN AUTO: 30.5 PG (ref 27–31.3)
MCHC RBC AUTO-ENTMCNC: 32.8 % (ref 33–37)
MCV RBC AUTO: 92.9 FL (ref 82–100)
MONOCYTES ABSOLUTE: 0.6 K/UL (ref 0.2–0.8)
MONOCYTES RELATIVE PERCENT: 7 %
NEUTROPHILS ABSOLUTE: 6.4 K/UL (ref 1.4–6.5)
NEUTROPHILS RELATIVE PERCENT: 69.1 %
PDW BLD-RTO: 15 % (ref 11.5–14.5)
PLATELET # BLD: 217 K/UL (ref 130–400)
POTASSIUM SERPL-SCNC: 3.9 MEQ/L (ref 3.4–4.9)
RBC # BLD: 3.98 M/UL (ref 4.2–5.4)
SODIUM BLD-SCNC: 143 MEQ/L (ref 135–144)
TOTAL PROTEIN: 7 G/DL (ref 6.3–8)
WBC # BLD: 9.2 K/UL (ref 4.8–10.8)

## 2020-01-24 PROCEDURE — 80053 COMPREHEN METABOLIC PANEL: CPT

## 2020-01-24 PROCEDURE — 36415 COLL VENOUS BLD VENIPUNCTURE: CPT

## 2020-01-24 PROCEDURE — G8482 FLU IMMUNIZE ORDER/ADMIN: HCPCS | Performed by: NURSE PRACTITIONER

## 2020-01-24 PROCEDURE — G8417 CALC BMI ABV UP PARAM F/U: HCPCS | Performed by: NURSE PRACTITIONER

## 2020-01-24 PROCEDURE — 85025 COMPLETE CBC W/AUTO DIFF WBC: CPT

## 2020-01-24 PROCEDURE — 99214 OFFICE O/P EST MOD 30 MIN: CPT | Performed by: NURSE PRACTITIONER

## 2020-01-24 PROCEDURE — G8926 SPIRO NO PERF OR DOC: HCPCS | Performed by: NURSE PRACTITIONER

## 2020-01-24 PROCEDURE — 3023F SPIROM DOC REV: CPT | Performed by: NURSE PRACTITIONER

## 2020-01-24 PROCEDURE — 1123F ACP DISCUSS/DSCN MKR DOCD: CPT | Performed by: NURSE PRACTITIONER

## 2020-01-24 PROCEDURE — 71046 X-RAY EXAM CHEST 2 VIEWS: CPT

## 2020-01-24 PROCEDURE — G8427 DOCREV CUR MEDS BY ELIG CLIN: HCPCS | Performed by: NURSE PRACTITIONER

## 2020-01-24 PROCEDURE — G8399 PT W/DXA RESULTS DOCUMENT: HCPCS | Performed by: NURSE PRACTITIONER

## 2020-01-24 PROCEDURE — 4040F PNEUMOC VAC/ADMIN/RCVD: CPT | Performed by: NURSE PRACTITIONER

## 2020-01-24 PROCEDURE — 1090F PRES/ABSN URINE INCON ASSESS: CPT | Performed by: NURSE PRACTITIONER

## 2020-01-24 PROCEDURE — 1036F TOBACCO NON-USER: CPT | Performed by: NURSE PRACTITIONER

## 2020-01-24 RX ORDER — IPRATROPIUM BROMIDE AND ALBUTEROL SULFATE 2.5; .5 MG/3ML; MG/3ML
3 SOLUTION RESPIRATORY (INHALATION)
COMMUNITY
Start: 2019-12-19

## 2020-01-24 RX ORDER — LEVOFLOXACIN 500 MG/1
500 TABLET, FILM COATED ORAL DAILY
Qty: 10 TABLET | Refills: 0 | Status: SHIPPED | OUTPATIENT
Start: 2020-01-24 | End: 2020-02-03

## 2020-01-24 RX ORDER — BACLOFEN 20 MG/1
20 TABLET ORAL 2 TIMES DAILY
Qty: 60 TABLET | Refills: 2 | Status: SHIPPED | OUTPATIENT
Start: 2020-01-24 | End: 2020-05-07

## 2020-01-24 ASSESSMENT — PATIENT HEALTH QUESTIONNAIRE - PHQ9
SUM OF ALL RESPONSES TO PHQ QUESTIONS 1-9: 0
2. FEELING DOWN, DEPRESSED OR HOPELESS: 0
SUM OF ALL RESPONSES TO PHQ9 QUESTIONS 1 & 2: 0
1. LITTLE INTEREST OR PLEASURE IN DOING THINGS: 0
SUM OF ALL RESPONSES TO PHQ QUESTIONS 1-9: 0

## 2020-01-24 NOTE — TELEPHONE ENCOUNTER
Naila Huggins. I will see the patient here shortly and will encourage her to call to reschedule her PT appt.

## 2020-01-24 NOTE — TELEPHONE ENCOUNTER
Physical therapy called from [facility] stating patient missed visit today due to having a few medical appointments today. Please advise.  Thank you

## 2020-01-24 NOTE — PROGRESS NOTES
extremity edema. She has had a cough that seems to be worsening throughout the day today. No sputum production. No fever or chills. No diarrhea or constipation. No dark tarry or bloody stools. EXAM:  Constitutional Blood pressure 130/70, pulse 69, temperature 97.8 °F (36.6 °C), temperature source Temporal, resp. rate 18, height 4' 11\" (1.499 m), weight 230 lb (104.3 kg), SpO2 98 %, not currently breastfeeding. She has a normal affect, no acute distress, appears well developed and well nourished. Neck:  neck- supple, no mass, non-tender and no bruits  Lungs:  Breathing Pattern: regular, no distress, Breath sounds: diminished breath sounds- throughout and scattered and rhonchi- right base  Heart:  Heart sounds are normal.  Regular rate and rhythm without murmur, gallop or rub. Abdomen:  Soft, non-tender, normal bowel sounds. No bruits, organomegaly or masses. Extremities: Extremities warm to touch, pink, with no edema. DIAGNOSIS:    Diagnosis Orders   1. History of recent pneumonia  Ambulatory referral to Pulmonology    XR CHEST STANDARD (2 VW)    CBC Auto Differential    Comprehensive Metabolic Panel    levofloxacin (LEVAQUIN) 500 MG tablet   2. Chest wall pain  XR CHEST STANDARD (2 VW)    levofloxacin (LEVAQUIN) 500 MG tablet   3. Cough  XR CHEST STANDARD (2 VW)    levofloxacin (LEVAQUIN) 500 MG tablet   4. Other emphysema ([facility] [address]- moderate COPD- PFT 10/18  Ambulatory referral to Pulmonology    levofloxacin (LEVAQUIN) 500 MG tablet   5. Muscle spasm  baclofen (LIORESAL) 20 MG tablet   6. Essential hypertension  CBC Auto Differential    Comprehensive Metabolic Panel       PLAN: Include orders in the DX section. Follow up: 2 weeks and as needed. Blood work one week prior as ordered. 1.  2.  3.  4.  Recommend stat x-ray of the chest as well as blood work to be done today. Office to call with results when available. Prescription sent for Levaquin to local pharmacy.   She can wait for result notification if symptoms do not worsen. She is encouraged to get routine activity around her home and to take deep breaths every hour. She is also reminded to use her nebulizer. Discussed recommendation for referral to pulmonology. She has not seen pulmonology in the past because she prefers to avoid leaving [place]. Discussed that a pulmonologist is now coming to the [place] office every other week and she states that she will see him then. Referral given. 5.  Discussed option of increased dose of baclofen. She will let me know if any side effects and will plan to follow-up in a month to review efficacy. 6.  Blood pressure has been well controlled on current combination medication. We will get current CBC and chemistry. Please note this report has been partially produced using speech recognition software and may cause contain errors related to that system including grammar, punctuation and spelling as well as words and phrases that may seem inappropriate. If there are questions or concerns please feel free to contact me to clarify.       Electronically signed by Vanesa Hendrix [id number], 4:23 PM 1/24/20

## 2020-01-27 RX ORDER — ROPINIROLE 1 MG/1
TABLET, FILM COATED ORAL
Qty: 90 TABLET | Refills: 3 | Status: SHIPPED | OUTPATIENT
Start: 2020-01-27 | End: 2020-08-17

## 2020-01-27 NOTE — TELEPHONE ENCOUNTER
Pharmacy is requesting medication refill.  Please approve or deny this request.    Rx requested:  Requested Prescriptions     Pending Prescriptions Disp Refills    rOPINIRole (REQUIP) 1 MG tablet [Pharmacy Med Name: rOPINIRole HCl 1 MG Oral Tablet] 90 tablet 3     Sig: TAKE 1 TABLET BY MOUTH THREE TIMES DAILY         Last Office Visit:   1/24/2020      Next Visit Date:  Future Appointments   Date Time Provider Dulce Brunson   2/28/2020  2:00 PM JUAN Gama - [facility]   3/17/2020 11:15 AM Bridgette Liu MD [address] [facility] [place] [facility]

## 2020-01-27 NOTE — RESULT ENCOUNTER NOTE
Please notify Milady Medici that CXR does show evidence of pneumonia to the base of her left lung this time. Continue with antibiotic as ordered last Friday and keep follow up appointment next month. Let me know sooner if she still has symptoms after completing antibiotic.

## 2020-02-05 ENCOUNTER — HOSPITAL ENCOUNTER (OUTPATIENT)
Dept: GENERAL RADIOLOGY | Age: 80
Discharge: HOME | End: 2020-02-07
Payer: MEDICARE

## 2020-02-05 ENCOUNTER — HOSPITAL ENCOUNTER (OUTPATIENT)
Age: 80
Discharge: HOME | End: 2020-02-07
Payer: MEDICARE

## 2020-02-05 ENCOUNTER — OFFICE VISIT (OUTPATIENT)
Dept: PULMONOLOGY | Age: 80
End: 2020-02-05
Payer: MEDICARE

## 2020-02-05 VITALS
BODY MASS INDEX: 46.37 KG/M2 | WEIGHT: 230 LBS | OXYGEN SATURATION: 96 % | RESPIRATION RATE: 16 BRPM | DIASTOLIC BLOOD PRESSURE: 74 MMHG | TEMPERATURE: 98.4 F | HEART RATE: 58 BPM | HEIGHT: 59 IN | SYSTOLIC BLOOD PRESSURE: 126 MMHG

## 2020-02-05 PROBLEM — E66.01 MORBID OBESITY (HCC): Status: ACTIVE | Noted: 2020-02-05

## 2020-02-05 PROBLEM — J18.9 PNEUMONIA OF LEFT LOWER LOBE DUE TO INFECTIOUS ORGANISM: Status: ACTIVE | Noted: 2020-02-05

## 2020-02-05 PROBLEM — R09.02 HYPOXIA: Status: ACTIVE | Noted: 2018-08-23

## 2020-02-05 PROBLEM — J44.89 ASTHMA WITH COPD (CHRONIC OBSTRUCTIVE PULMONARY DISEASE): Chronic | Status: ACTIVE | Noted: 2018-02-20

## 2020-02-05 PROBLEM — J44.9 ASTHMA WITH COPD (CHRONIC OBSTRUCTIVE PULMONARY DISEASE) (HCC): Chronic | Status: ACTIVE | Noted: 2018-02-20

## 2020-02-05 PROCEDURE — 3023F SPIROM DOC REV: CPT | Performed by: INTERNAL MEDICINE

## 2020-02-05 PROCEDURE — G8926 SPIRO NO PERF OR DOC: HCPCS | Performed by: INTERNAL MEDICINE

## 2020-02-05 PROCEDURE — 71046 X-RAY EXAM CHEST 2 VIEWS: CPT

## 2020-02-05 PROCEDURE — 4040F PNEUMOC VAC/ADMIN/RCVD: CPT | Performed by: INTERNAL MEDICINE

## 2020-02-05 PROCEDURE — G8482 FLU IMMUNIZE ORDER/ADMIN: HCPCS | Performed by: INTERNAL MEDICINE

## 2020-02-05 PROCEDURE — 1123F ACP DISCUSS/DSCN MKR DOCD: CPT | Performed by: INTERNAL MEDICINE

## 2020-02-05 PROCEDURE — G8427 DOCREV CUR MEDS BY ELIG CLIN: HCPCS | Performed by: INTERNAL MEDICINE

## 2020-02-05 PROCEDURE — 1090F PRES/ABSN URINE INCON ASSESS: CPT | Performed by: INTERNAL MEDICINE

## 2020-02-05 PROCEDURE — 1036F TOBACCO NON-USER: CPT | Performed by: INTERNAL MEDICINE

## 2020-02-05 PROCEDURE — 99215 OFFICE O/P EST HI 40 MIN: CPT | Performed by: INTERNAL MEDICINE

## 2020-02-05 PROCEDURE — G8417 CALC BMI ABV UP PARAM F/U: HCPCS | Performed by: INTERNAL MEDICINE

## 2020-02-05 PROCEDURE — G8399 PT W/DXA RESULTS DOCUMENT: HCPCS | Performed by: INTERNAL MEDICINE

## 2020-02-05 ASSESSMENT — ENCOUNTER SYMPTOMS
DIARRHEA: 0
VOICE CHANGE: 0
NAUSEA: 0
CHEST TIGHTNESS: 0
COUGH: 0
SORE THROAT: 0
WHEEZING: 0
SINUS PRESSURE: 0
ABDOMINAL PAIN: 0
TROUBLE SWALLOWING: 0
RHINORRHEA: 0
EYE ITCHING: 0
SHORTNESS OF BREATH: 1
EYE DISCHARGE: 0
VOMITING: 0

## 2020-02-05 NOTE — PROGRESS NOTES
Subjective:     Priscilla Ruth is a 68 y.o. female who complains today of:     Chief Complaint   Patient presents with    Follow-up     f/u for SOB. HPI  she was treated for pneumonia for 1 week  Of antibiotics in dec. 2019. She was at [facility] and went to [facility] for rehab for few weeks . She was seen by  JUAN Gama on 1/24/20 and took 1 more week of levaquin, CXR show left lower lobe pneumonia. . She is dooing better now   Sheis at home , she is 3 lit 24 hour a day , she was on 2 lit before. C/o shortness of breath , worse with exertion. Occasional Wheezing   Occassional Cough ,no mucus  No Hemoptysis  She said she sneezing  And pulled muscle   No Chest tightness   No Chest pain with radiation  or pleuritic pain  No Fever or chills. C/o Rhinorrhea and postnasal drip.           Allergies:  Seasonal  Past Medical History:   Diagnosis Date    Allergic rhinitis     Arthritis     Asthma     Bilateral lower extremity edema 1/14/2019    Chronic sinusitis     Class 3 obesity with body mass index (BMI) of 40.0 to 44.9 in adult 11/7/2017    Deviated septum     Dysthymia 4/27/2018    Fibromyalgia 12/5/2016    Generalized anxiety disorder     Hyperlipidemia     Hypertension     Hypertrophy of nasal turbinates     Other emphy[address]ma ([facility] [address]- moderate COPD- PFT 10/18 10/11/2018    Restless leg syndrome 12/5/2016     Past Surgical History:   Procedure Laterality Date    APPENDECTOMY      BRONCHOSCOPY  11/2018    Bronch[place]chial Biopsy [place]    CHOLECYSTECTOMY      COLONOSCOPY      ENDOSCOPY, COLON, DIAGNOSTIC      CT OFFICE/OUTPT VISIT,PROCEDURE ONLY N/A 1/4/2018    SEPTOPLASTY MICRODEBRIDER ASSISTED TURBINOPALSTY AND OUT-FRACTURING BILATERAL BALLOON SINUPLASTY OF MAXILLARY AND SPENOID SINUSES performed by Lori Buerger, MD at [facility]     Family History   Problem Relation Age of Onset    Allergy (Severe) Paternal Cousin     Cancer Mother         renal CA    Heart Disease Father     Heart Attack Father     Cancer Sister         Breast CA    No Known Problems Daughter     No Known Problems Daughter      Social History     Socioeconomic History    Marital status:      Spouse name: Not on file    Number of children: Not on file    Years of education: Not on file    Highest education level: Not on file   Occupational History    Not on file   Social Needs    Financial resource strain: Not on file    Food insecurity:     Worry: Not on file     Inability: Not on file    Transportation needs:     Medical: Not on file     Non-medical: Not on file   Tobacco Use    Smoking status: Former Smoker     Packs/day: 2.00     Years: 20.00     Pack years: 40.00     Types: Cigarettes     Last attempt to quit: 3/7/1990     Years since quittin.9    Smokeless tobacco: Never Used    Tobacco comment: quit 25 years ago   Substance and Sexual Activity    Alcohol use: Yes     Comment: occ    Drug use: No    Sexual activity: Not on file   Lifestyle    Physical activity:     Days per week: Not on file     Minutes per session: Not on file    Stress: Not on file   Relationships    Social connections:     Talks on phone: Not on file     Gets together: Not on file     Attends Restorationism service: Not on file     Active member of club or organization: Not on file     Attends meetings of clubs or organizations: Not on file     Relationship status: Not on file    Intimate partner violence:     Fear of current or ex partner: Not on file     Emotionally abused: Not on file     Physically abused: Not on file     Forced sexual activity: Not on file   Other Topics Concern    Not on file   Social History Narrative    Not on file         Review of Systems   Constitutional: Negative for chills, diaphoresis, fatigue and fever. HENT: Positive for sneezing.  Negative for congestion, mouth sores, nosebleeds, postnasal drip, rhinorrhea, sinus pressure, sore throat, trouble swallowing and voice change. Eyes: Negative for discharge, itching and visual disturbance. Respiratory: Positive for shortness of breath. Negative for cough, chest tightness and wheezing. Cardiovascular: Negative for chest pain, palpitations and leg swelling. Gastrointestinal: Negative for abdominal pain, diarrhea, nausea and vomiting. Genitourinary: Negative for difficulty urinating and hematuria. Musculoskeletal: Negative for arthralgias, joint swelling and myalgias. Skin: Negative for rash. Allergic/Immunologic: Negative for environmental allergies and food allergies. Neurological: Negative for dizziness, tremors, weakness and headaches. Psychiatric/Behavioral: Negative for behavioral problems and sleep disturbance.         :     Vitals:    02/05/20 1134   BP: 126/74   Pulse: 58   Resp: 16   Temp: 98.4 °F (36.9 °C)   TempSrc: Tympanic   SpO2: 96%   Weight: 230 lb (104.3 kg)   Height: 4' 11\" (1.499 m)     Wt Readings from Last 3 Encounters:   02/05/20 230 lb (104.3 kg)   01/24/20 230 lb (104.3 kg)   11/22/19 223 lb 3.2 oz (101.2 kg)         Physical Exam  Constitutional:       General: She is not in acute distress. Appearance: She is well-developed. She is obese. She is not diaphoretic. HENT:      Head: Normocephalic and atraumatic. Nose: Nose normal.   Eyes:      Pupils: Pupils are equal, round, and reactive to light. Neck:      Thyroid: No thyromegaly. Vascular: No JVD. Trachea: No tracheal deviation. Cardiovascular:      Rate and Rhythm: Normal rate and regular rhythm. Heart sounds: No murmur. No friction rub. No gallop. Pulmonary:      Effort: No respiratory distress. Breath sounds: No wheezing or rales. Comments: diminished Breath sound bilaterally. Chest:      Chest wall: No tenderness. Abdominal:      General: There is no distension. Tenderness: There is no abdominal tenderness. There is no rebound. Musculoskeletal: Normal range of motion. effusions. There is no   pneumothorax. Bones are unremarkable. Cholecystectomy clips are noted. Impression SMALL LEFT LOWER LOBE INFILTRATE. Assessment/Plan:     1. Pneumonia of left lower lobe due to infectious organism [facility])  She was treated for pneumonia with 1 week of antibiotic in December. She was admitted at [facility] from there she went to nursing home for a few weeks. She was recently seen by nurse practitioner and she had a chest x-ray still shows left lower lobe pneumonia and she received 1 more week of Levaquin. She has no symptoms of active pneumonia at this time I will request follow-up chest x-ray. No antibiotic at this time  - XR CHEST STANDARD (2 VW); Future    2. Asthma with COPD (chronic obstructive pulmonary disease) ([facility] [address]  She is on Advair Diskus 1 puff twice daily albuterol HFA 2 puff 4 times daily as needed and nebulizer with DuoNeb 4 times daily as needed. Continue same for now and recommend PFT in future    3. Pulmonary hypertension, moderate to severe [facility])  Patient has moderate pulmonary hypertension with RVSP 54. Noted in 2D echo done in January 29, 2019 , patient has secondary pulmonary pretension and recommends to continue O2. Keep saturation 90% or above. 4. Hypoxia  She is on 3 L O2 24 hours a day. She said she was using 2 L before. Currently O2 saturation 96%. Advised try to cut down O2 to 2 L NC keep O2 saturation 90% or above    5. Morbid obesity ([address]? NOEL  Patient patient is advised try to lose weight. obesity related risk explained to the patient ,  Current weight:  230 lb (104.3 kg) Lbs. BMI:  Body mass index is 46.45 kg/m². Suggested weight control approaches, including dietary changes , exercise, behavioral modification. Likely has underlying sleep apnea but she does not want to have sleep study and work-up for sleep apnea      Return in about 2 weeks (around 2/19/2020) for asthma, COPD, CXR result.       Rosa Gomez MD

## 2020-02-06 ENCOUNTER — TELEPHONE (OUTPATIENT)
Dept: PULMONOLOGY | Age: 80
End: 2020-02-06

## 2020-02-06 NOTE — TELEPHONE ENCOUNTER
Pt called today stating she was returning your call about a ct scan that you wanted her to have done. She will like to talk to you. Please advice.

## 2020-02-11 NOTE — TELEPHONE ENCOUNTER
Pharmacy requesting medication refill.  Please approve or deny this request.    Rx requested:  Requested Prescriptions     Pending Prescriptions Disp Refills    olmesartan-hydrochlorothiazide (BENICAR HCT) 40-25 MG per tablet [Pharmacy Med Name: Olmesartan Medoxomil-HCTZ 40-25 MG Oral Tablet] 60 tablet 0     Sig: TAKE 1 TABLET BY MOUTH ONCE DAILY         Last Office Visit:   9/11/2019      Next Visit Date:  Future Appointments   Date Time Provider Dulce Brunson   2/19/2020 11:30 AM Tanisha Gu MD [facility]   2/28/2020  2:00 PM JUAN Lemos - [facility] [place]   3/17/2020 11:15 AM Kentrell Varela MD [address]

## 2020-02-13 ENCOUNTER — TELEPHONE (OUTPATIENT)
Dept: FAMILY MEDICINE CLINIC | Age: 80
End: 2020-02-13

## 2020-02-13 RX ORDER — OLMESARTAN MEDOXOMIL AND HYDROCHLOROTHIAZIDE 40/25 40; 25 MG/1; MG/1
TABLET ORAL
Qty: 60 TABLET | Refills: 0 | Status: SHIPPED | OUTPATIENT
Start: 2020-02-13 | End: 2020-04-20

## 2020-02-13 NOTE — TELEPHONE ENCOUNTER
[facility] calling in today to let us know that the release of medical records form was received. The medical records is too large to fax over so they are mailing it over today. I gave them our new address, she stated she will fax over a shipping confirmation to [phone].

## 2020-02-19 ENCOUNTER — OFFICE VISIT (OUTPATIENT)
Dept: PULMONOLOGY | Age: 80
End: 2020-02-19
Payer: MEDICARE

## 2020-02-19 VITALS
RESPIRATION RATE: 16 BRPM | TEMPERATURE: 97.8 F | BODY MASS INDEX: 45.56 KG/M2 | HEART RATE: 72 BPM | WEIGHT: 226 LBS | SYSTOLIC BLOOD PRESSURE: 160 MMHG | DIASTOLIC BLOOD PRESSURE: 80 MMHG | HEIGHT: 59 IN | OXYGEN SATURATION: 91 %

## 2020-02-19 PROCEDURE — 1036F TOBACCO NON-USER: CPT | Performed by: INTERNAL MEDICINE

## 2020-02-19 PROCEDURE — G8417 CALC BMI ABV UP PARAM F/U: HCPCS | Performed by: INTERNAL MEDICINE

## 2020-02-19 PROCEDURE — G8399 PT W/DXA RESULTS DOCUMENT: HCPCS | Performed by: INTERNAL MEDICINE

## 2020-02-19 PROCEDURE — 1123F ACP DISCUSS/DSCN MKR DOCD: CPT | Performed by: INTERNAL MEDICINE

## 2020-02-19 PROCEDURE — 99214 OFFICE O/P EST MOD 30 MIN: CPT | Performed by: INTERNAL MEDICINE

## 2020-02-19 PROCEDURE — 4040F PNEUMOC VAC/ADMIN/RCVD: CPT | Performed by: INTERNAL MEDICINE

## 2020-02-19 PROCEDURE — 1090F PRES/ABSN URINE INCON ASSESS: CPT | Performed by: INTERNAL MEDICINE

## 2020-02-19 PROCEDURE — G8427 DOCREV CUR MEDS BY ELIG CLIN: HCPCS | Performed by: INTERNAL MEDICINE

## 2020-02-19 PROCEDURE — 3023F SPIROM DOC REV: CPT | Performed by: INTERNAL MEDICINE

## 2020-02-19 PROCEDURE — G8482 FLU IMMUNIZE ORDER/ADMIN: HCPCS | Performed by: INTERNAL MEDICINE

## 2020-02-19 PROCEDURE — G8926 SPIRO NO PERF OR DOC: HCPCS | Performed by: INTERNAL MEDICINE

## 2020-02-19 ASSESSMENT — ENCOUNTER SYMPTOMS
EYE DISCHARGE: 0
CHEST TIGHTNESS: 0
SINUS PRESSURE: 0
ABDOMINAL PAIN: 0
SORE THROAT: 0
EYE ITCHING: 0
VOMITING: 0
COUGH: 0
WHEEZING: 0
SHORTNESS OF BREATH: 1
VOICE CHANGE: 0
DIARRHEA: 0
TROUBLE SWALLOWING: 0
NAUSEA: 0
RHINORRHEA: 1

## 2020-02-19 NOTE — PROGRESS NOTES
Subjective:     Milady Orr is a 68 y.o. female who complains today of:     Chief Complaint   Patient presents with    Follow-up     two week f/u for CXR results. HPI  She came for f/u after CXR. She is on 2 lit  24 hour a day. C/o shortness of breath , worse with exertion. Occasional Wheezing   No Cough , No Sputum  No Hemoptysis  No Chest tightness   No Chest pain with radiation  or pleuritic pain  No Fever or chills. C/o Rhinorrhea and postnasal drip. She is on Advair Diskus 1 puff twice daily albuterol HFA 2 puff 4 times daily as needed and nebulizer with DuoNeb 4 times daily as needed. She had no PFT done and she is willing to do it .   she has possible NOEL but does not want PSG      Allergies:  Seasonal  Past Medical History:   Diagnosis Date    Allergic rhinitis     Arthritis     Asthma     Bilateral lower extremity edema 1/14/2019    Chronic sinusitis     Class 3 obesity with body mass index (BMI) of 40.0 to 44.9 in adult 11/7/2017    Deviated septum     Dysthymia 4/27/2018    Fibromyalgia 12/5/2016    Generalized anxiety disorder     Hyperlipidemia     Hypertension     Hypertrophy of nasal turbinates     Other emphysema ([facility] [address]- moderate COPD- PFT 10/18 10/11/2018    Restless leg syndrome 12/5/2016     Past Surgical History:   Procedure Laterality Date    APPENDECTOMY      BRONCHOSCOPY  11/2018    Bronchoscopy with Bronchial Biopsy [place]    CHOLECYSTECTOMY      COLONOSCOPY      ENDOSCOPY, COLON, DIAGNOSTIC      MO OFFICE/OUTPT VISIT,PROCEDURE ONLY N/A 1/4/2018    SEPTOPLASTY MICRODEBRIDER ASSISTED TURBINOPALSTY AND OUT-FRACTURING BILATERAL BALLOON SINUPLASTY OF MAXILLARY AND SPENOID SINUSES performed by Maria Del Carmen Felipe MD at [facility]     Family History   Problem Relation Age of Onset    Allergy (Severe) Paternal Cousin    [facility] Cancer Mother         renal CA    Heart Disease Father     Heart Attack Father     Cancer Sister         Breast CA    No Known Problems Daughter     No Known Problems Daughter      Social History     Socioeconomic History    Marital status:      Spouse name: Not on file    Number of children: Not on file    Years of education: Not on file    Highest education level: Not on file   Occupational History    Not on file   Social Needs    Financial resource strain: Not on file    Food insecurity:     Worry: Not on file     Inability: Not on file    Transportation needs:     Medical: Not on file     Non-medical: Not on file   Tobacco Use    Smoking status: Former Smoker     Packs/day: 2.00     Years: 20.00     Pack years: 40.00     Types: Cigarettes     Last attempt to quit: 3/7/1990     Years since quittin.9    Smokeless tobacco: Never Used    Tobacco comment: quit 25 years ago   Substance and Sexual Activity    Alcohol use: Yes     Comment: occ    Drug use: No    Sexual activity: Not on file   Lifestyle    Physical activity:     Days per week: Not on file     Minutes per session: Not on file    Stress: Not on file   Relationships    Social connections:     Talks on phone: Not on file     Gets together: Not on file     Attends Oriental orthodox service: Not on file     Active member of club or organization: Not on file     Attends meetings of clubs or organizations: Not on file     Relationship status: Not on file    Intimate partner violence:     Fear of current or ex partner: Not on file     Emotionally abused: Not on file     Physically abused: Not on file     Forced sexual activity: Not on file   Other Topics Concern    Not on file   Social History Narrative    Not on file         Review of Systems   Constitutional: Negative for chills, diaphoresis, fatigue and fever. HENT: Positive for postnasal drip and rhinorrhea. Negative for congestion, mouth sores, nosebleeds, sinus pressure, sneezing, sore throat, trouble swallowing and voice change. Eyes: Negative for discharge, itching and visual disturbance.    Respiratory: Positive for shortness of breath. Negative for cough, chest tightness and wheezing. Cardiovascular: Negative for chest pain, palpitations and leg swelling. Gastrointestinal: Negative for abdominal pain, diarrhea, nausea and vomiting. Genitourinary: Negative for difficulty urinating and hematuria. Musculoskeletal: Negative for arthralgias, joint swelling and myalgias. Skin: Negative for rash. Allergic/Immunologic: Negative for environmental allergies and food allergies. Neurological: Negative for dizziness, tremors, weakness and headaches. Psychiatric/Behavioral: Negative for behavioral problems and sleep disturbance.         :     Vitals:    02/19/20 1108 02/19/20 1116   BP: (!) 160/80 (!) 160/80   Pulse: 72    Resp: 16    Temp: 97.8 °F (36.6 °C)    TempSrc: Temporal    SpO2: 91%    Weight: 226 lb (102.5 kg)    Height: 4' 11\" (1.499 m)      Wt Readings from Last 3 Encounters:   02/19/20 226 lb (102.5 kg)   02/05/20 230 lb (104.3 kg)   01/24/20 230 lb (104.3 kg)         Physical Exam  Constitutional:       General: She is not in acute distress. Appearance: She is well-developed. She is obese. She is not diaphoretic. HENT:      Head: Normocephalic and atraumatic. Nose: Nose normal.   Eyes:      Pupils: Pupils are equal, round, and reactive to light. Neck:      Thyroid: No thyromegaly. Vascular: No JVD. Trachea: No tracheal deviation. Cardiovascular:      Rate and Rhythm: Normal rate and regular rhythm. Heart sounds: No murmur. No friction rub. No gallop. Pulmonary:      Effort: No respiratory distress. Breath sounds: No wheezing or rales. Comments: diminished Breath sound bilaterally. Chest:      Chest wall: No tenderness. Abdominal:      General: There is no distension. Tenderness: There is no abdominal tenderness. There is no rebound. Musculoskeletal: Normal range of motion. Lymphadenopathy:      Cervical: No cervical adenopathy.    Skin: left basilar airspace disease concerning for a persistent infiltrate/pneumonia       Assessment/Plan:     1. Asthma with COPD (chronic obstructive pulmonary disease) ([facility]  She is on Advair Diskus 1 puff twice a day, albuterol HFA 4 times daily as needed nebulizer with DuoNeb 4 times daily as needed. She is on 2 L O2 via nasal cannula. Will request PFT before next visit. Continue O2 and bronchodilator as before  - Full PFT Study With Bronchodilator; Future    2. Pneumonia of left lower lobe due to infectious organism [facility])  Patient was treated for pneumonia. She received antibiotic twice. She is not having any symptoms of pneumonia at this time chest x-ray still showing persistent infiltration likely scarring or atelectasis. No need for antibiotic    3. Pulmonary hypertension, moderate to severe [facility])  She has moderate pulmonary hypertension, multifactorial,She does not want to go for sleep study for sleep apnea. Advised to continue O2 and keep saturation 90% or above. 4. Hypoxia  She is on 2 L O2 24 hours a day. Continue O2 to keep saturation 90% or above. 5. Morbid obesity ([facility]  Patient patient is advised try to lose weight. obesity related risk explained to the patient ,  Current weight:  226 lb (102.5 kg) Lbs. BMI:  Body mass index is 45.65 kg/m². Suggested weight control approaches, including dietary changes , exercise, behavioral modification. Return in about 3 months (around 5/19/2020) for asthma, COPD, pft result.       Rain Roger MD

## 2020-02-28 ENCOUNTER — OFFICE VISIT (OUTPATIENT)
Dept: FAMILY MEDICINE CLINIC | Age: 80
End: 2020-02-28
Payer: MEDICARE

## 2020-02-28 VITALS
HEIGHT: 59 IN | OXYGEN SATURATION: 94 % | DIASTOLIC BLOOD PRESSURE: 60 MMHG | TEMPERATURE: 97.5 F | SYSTOLIC BLOOD PRESSURE: 126 MMHG | RESPIRATION RATE: 20 BRPM | HEART RATE: 84 BPM | WEIGHT: 224 LBS | BODY MASS INDEX: 45.16 KG/M2

## 2020-02-28 PROBLEM — I20.9 ANGINA PECTORIS, UNSPECIFIED (HCC): Status: ACTIVE | Noted: 2020-02-28

## 2020-02-28 PROCEDURE — 1036F TOBACCO NON-USER: CPT | Performed by: NURSE PRACTITIONER

## 2020-02-28 PROCEDURE — G8482 FLU IMMUNIZE ORDER/ADMIN: HCPCS | Performed by: NURSE PRACTITIONER

## 2020-02-28 PROCEDURE — 3023F SPIROM DOC REV: CPT | Performed by: NURSE PRACTITIONER

## 2020-02-28 PROCEDURE — G8926 SPIRO NO PERF OR DOC: HCPCS | Performed by: NURSE PRACTITIONER

## 2020-02-28 PROCEDURE — G8399 PT W/DXA RESULTS DOCUMENT: HCPCS | Performed by: NURSE PRACTITIONER

## 2020-02-28 PROCEDURE — G8427 DOCREV CUR MEDS BY ELIG CLIN: HCPCS | Performed by: NURSE PRACTITIONER

## 2020-02-28 PROCEDURE — G8417 CALC BMI ABV UP PARAM F/U: HCPCS | Performed by: NURSE PRACTITIONER

## 2020-02-28 PROCEDURE — 99214 OFFICE O/P EST MOD 30 MIN: CPT | Performed by: NURSE PRACTITIONER

## 2020-02-28 PROCEDURE — 4040F PNEUMOC VAC/ADMIN/RCVD: CPT | Performed by: NURSE PRACTITIONER

## 2020-02-28 PROCEDURE — 1123F ACP DISCUSS/DSCN MKR DOCD: CPT | Performed by: NURSE PRACTITIONER

## 2020-02-28 PROCEDURE — 1090F PRES/ABSN URINE INCON ASSESS: CPT | Performed by: NURSE PRACTITIONER

## 2020-02-28 NOTE — PROGRESS NOTES
Dyslipidemia and Hypertension: Aydee Whyte presents for evaluation of lipids. Compliance with treatment thus far has been good. The patient exercises intermittently. Patient here for follow-up of elevated blood pressure. She is exercising and is adherent to low salt diet. Blood pressure is well controlled at home Antihypertensive medication side effects: no medication side effects noted. Use of agents associated with hypertension: none. No new myalgias or GI upset on atorvastatin (Lipitor). COPD: He states that breathing has been pretty good overall lately. Continues to wear her oxygen nasal cannula and take maintenance medications as prescribed. Very little need for rescue inhaler. She has not had recent significant chest tightness or wheezing. No sputum production. No fever or chills. She saw pulmonology outpatient recently and forgot that she was advised to have pulmonary function testing done. She will see Dr. Edward Mckeon again in May. RLS: She states that restless leg symptoms have been stable with Requip taken at bedtime. She has been sleeping well while taking the muscle relaxer as well near bedtime. She is very thankful to have the medications and states that she is sleeping better than she has been in a long time. Depression and anxiety: Aydee Whyte reports being in a good mood that is stable. The patient is not reporting insomnia, difficulty concentrating and usual interest in activities. This patient is not homicidal or suicidal.  She admits to some frustration about having to wear oxygen all the time but states that overall her mood has been good. No side effects with the Zoloft reported. Lab Results   Component Value Date    ALT 28 01/24/2020    AST 32 01/24/2020     Lab Results   Component Value Date    CHOL 110 08/22/2019    TRIG 129 08/22/2019    HDL 37 (L) 08/22/2019    LDLCALC 47 08/22/2019        PMH: The patient is not known to have coexisting coronary artery disease. ROS: This patient reports no chest pain or pressure. There is no increased shortness of breath or cough. The patient reports no nausea or vomiting. There is no heartburn or indigestion. There is no diarrhea or constipation. No black, bloody, mucusy or tarry stool noticed. The patient reports no bloating and no change in appetite. There is no numbness, tingling or swelling in the extremities. EXAM:  Constitutional Blood pressure 126/60, pulse 84, temperature 97.5 °F (36.4 °C), temperature source Temporal, resp. rate 20, height 4' 11\" (1.499 m), weight 224 lb (101.6 kg), SpO2 94 %, not currently breastfeeding. .  She has a normal affect, no acute distress, appears well developed and well nourished. Neck:  neck- supple, no mass, non-tender and no bruits  Lungs:  Normal expansion. Clear to auscultation. No rales, rhonchi, or wheezing., No chest wall tenderness. Heart:  Heart sounds are normal.  Regular rate and rhythm without murmur, gallop or rub. Abdomen:  Soft, non-tender, normal bowel sounds. No bruits, organomegaly or masses. Extremities: Extremities warm to touch, pink, with no edema. DIAGNOSIS:    Diagnosis Orders   1. Essential hypertension     2. Dyslipidemia     3. Restless leg syndrome     4. Asthma with COPD (chronic obstructive pulmonary disease) ([address]     5. Moderate episode of recurrent major depressive disorder ([address]     6. Generalized anxiety disorder     7. Angina pectoris, unspecified ([address]         Plan:  Continue current medicines and dosages. Continue diet and exercise programs, improving where possible. Follow up in 3 months with lab work one week prior. For further details see orders placed. 1.  Pressure is well controlled on current medication. No side effects reported. Continue the same. 2.  We will check lipid panel with next lab but she has been taking a atorvastatin with no side effects reported. Trying to eat a balanced diet.     3.  She states that symptoms have been well managed with current combination of muscle relaxer and Requip at bedtime. She has been sleeping very well and feels well rested in the morning. 4.  She is following with pulmonology outpatient in the [place] office and is wearing continuous oxygen. She was reminded to schedule pulmonary function testing and she will do so in the near future. I did review office note with pulmonology who feels that area of the lung is more likely scarring that is persistent. She does not feel ill and breathing has been without difficulty. 5.  6.  Mood has been stable overall on Zoloft. No side effects reported. Continue the same. 7.  She has not had any recent symptoms of angina. She does plan to follow with cardiology next month as scheduled. Please note this report has been partially produced using speech recognition software and may cause contain errors related to that system including grammar, punctuation and spelling as well as words and phrases that may seem inappropriate. If there are questions or concerns please feel free to contact me to clarify.         Electronically signed by Evelia Adamson, 3:42 PM 2/28/20

## 2020-03-09 RX ORDER — ATORVASTATIN CALCIUM 40 MG/1
40 TABLET, FILM COATED ORAL DAILY
Qty: 90 TABLET | Refills: 0 | Status: SHIPPED | OUTPATIENT
Start: 2020-03-09 | End: 2020-06-16

## 2020-03-25 ENCOUNTER — TELEPHONE (OUTPATIENT)
Dept: FAMILY MEDICINE CLINIC | Age: 80
End: 2020-03-25

## 2020-03-25 NOTE — TELEPHONE ENCOUNTER
Please contact [facility] to see if there is possibility for in-home need for oxygen assessment to be done by their company. I received forms for recertification and due to the coronavirus pandemic I do not recommend that this patient present to the office for testing to prove medical necessity for oxygen.

## 2020-04-20 RX ORDER — OLMESARTAN MEDOXOMIL AND HYDROCHLOROTHIAZIDE 40/25 40; 25 MG/1; MG/1
TABLET ORAL
Qty: 60 TABLET | Refills: 0 | Status: SHIPPED | OUTPATIENT
Start: 2020-04-20 | End: 2020-06-16

## 2020-04-30 ENCOUNTER — TELEPHONE (OUTPATIENT)
Dept: FAMILY MEDICINE CLINIC | Age: 80
End: 2020-04-30

## 2020-05-05 ENCOUNTER — TELEPHONE (OUTPATIENT)
Dept: FAMILY MEDICINE CLINIC | Age: 80
End: 2020-05-05

## 2020-05-07 RX ORDER — BACLOFEN 20 MG/1
TABLET ORAL
Qty: 60 TABLET | Refills: 0 | Status: SHIPPED | OUTPATIENT
Start: 2020-05-07 | End: 2020-06-09

## 2020-05-19 ENCOUNTER — VIRTUAL VISIT (OUTPATIENT)
Dept: CARDIOLOGY CLINIC | Age: 80
End: 2020-05-19
Payer: MEDICARE

## 2020-05-19 PROCEDURE — 99213 OFFICE O/P EST LOW 20 MIN: CPT | Performed by: INTERNAL MEDICINE

## 2020-05-19 ASSESSMENT — ENCOUNTER SYMPTOMS
VOMITING: 0
DIARRHEA: 0
APNEA: 0
BLOOD IN STOOL: 0
NAUSEA: 0
CHEST TIGHTNESS: 0
SHORTNESS OF BREATH: 0

## 2020-05-19 NOTE — PROGRESS NOTES
2020    TELEHEALTH EVALUATION -- Audio/Visual (During [facility]-20 public health emergency)    HPI:    Allison Nunn (:  1940) has requested an audio/video evaluation for the following concern(s): Follow-up of hypertension. Doing well. Does not get out of the house too much with the COVID issues. No chest pain congestive heart failure. No ankle edema. No dizziness. She will see me in 6 months        Review of Systems   Constitutional: Negative for diaphoresis and fatigue. HENT: Negative for nosebleeds. Respiratory: Negative for apnea, chest tightness and shortness of breath. Cardiovascular: Negative for chest pain, palpitations and leg swelling. Gastrointestinal: Negative for blood in stool, diarrhea, nausea and vomiting. Musculoskeletal: Negative for myalgias, neck pain and neck stiffness. Neurological: Negative for dizziness, seizures, syncope, weakness, light-headedness, numbness and headaches. Hematological: Does not bruise/bleed easily. Psychiatric/Behavioral: Negative for confusion. The patient is not nervous/anxious. All other systems reviewed and are negative. Prior to Visit Medications    Medication Sig Taking?  Authorizing Provider   sertraline (ZOLOFT) 50 MG tablet Take 1 tablet by mouth once daily Yes JUAN Burr CNP   baclofen (LIORESAL) 20 MG tablet Take 1 tablet by mouth twice daily Yes JUAN Burr CNP   olmesartan-hydrochlorothiazide (BENICAR HCT) 40-25 MG per tablet Take 1 tablet by mouth once daily Yes JUAN Burr CNP   atorvastatin (LIPITOR) 40 MG tablet Take 1 tablet by mouth daily Yes JUAN Burr CNP   rOPINIRole (REQUIP) 1 MG tablet TAKE 1 TABLET BY MOUTH THREE TIMES DAILY Yes JUAN Wisdom CNP   ipratropium-albuterol (DUONEB) 0.5-2.5 (3) MG/3ML SOLN nebulizer solution Inhale 3 mLs into the lungs Yes Historical Provider, MD   fluticasone-salmeterol (ADVAIR) 250-50 MCG/DOSE AEPB Inhale 1 puff into the lungs every 12 hours Yes Bernestine Setting JUAN Castellon CNP   Blood Pressure Monitoring (SPHYGMOMANOMETER) MISC 1 Device by Does not apply route daily Yes Bernestine Setting JUAN Castellon CNP   albuterol sulfate HFA (VENTOLIN HFA) 108 (90 Base) MCG/ACT inhaler Inhale 2 puffs into the lungs every 6 hours as needed for Wheezing Yes Gayle Lui MD   fluticasone (FLONASE) 50 MCG/ACT nasal spray 2 sprays by Nasal route daily Yes [place], MD   polyethyl glycol-propyl glycol 0.4-0.3 % (SYSTANE) 0.4-0.3 % ophthalmic solution 1 drop as needed for Dry Eyes Yes Historical Provider, MD       Social History     Tobacco Use    Smoking status: Former Smoker     Packs/day: 2.00     Years: 20.00     Pack years: 40.00     Types: Cigarettes     Last attempt to quit: 3/7/1990     Years since quittin.2    Smokeless tobacco: Never Used    Tobacco comment: quit 25 years ago   Substance Use Topics    Alcohol use: Yes     Comment: occ    Drug use: No            PHYSICAL EXAMINATION:  [ INSTRUCTIONS:  \"[x]\" Indicates a positive item  \"[]\" Indicates a negative item  -- DELETE ALL ITEMS NOT EXAMINED]  Vital Signs: (As obtained by patient/caregiver or practitioner observation)    Blood pressure-  Heart rate-    Respiratory rate-    Temperature-  Pulse oximetry-     Constitutional: [x] Appears well-developed and well-nourished [x] No apparent distress      [] Abnormal-   Mental status  [x] Alert and awake  [x] Oriented to person/place/time []Able to follow commands      Eyes:  EOM    []  Normal  [] Abnormal-  Sclera  []  Normal  [] Abnormal -         Discharge [x]  None visible  [] Abnormal -    HENT:   [] Normocephalic, atraumatic.   [] Abnormal   [] Mouth/Throat: Mucous membranes are moist.     External Ears [x] Normal  [] Abnormal-     Neck: [x] No visualized mass     Pulmonary/Chest: [x] Respiratory effort normal.  [x] No visualized signs of difficulty breathing or respiratory distress        [] Abnormal-      Musculoskeletal:   [x] phone: None     Gets together: None     Attends Rastafari service: None     Active member of club or organization: None     Attends meetings of clubs or organizations: None     Relationship status: None    Intimate partner violence     Fear of current or ex partner: None     Emotionally abused: None     Physically abused: None     Forced sexual activity: None   Other Topics Concern    None   Social History Narrative    None       Allergies   Allergen Reactions    Seasonal      Grass, trees, dust, pollen. YEAR ROUND       Current Outpatient Medications   Medication Sig Dispense Refill    sertraline (ZOLOFT) 50 MG tablet Take 1 tablet by mouth once daily 90 tablet 0    baclofen (LIORESAL) 20 MG tablet Take 1 tablet by mouth twice daily 60 tablet 0    olmesartan-hydrochlorothiazide (BENICAR HCT) 40-25 MG per tablet Take 1 tablet by mouth once daily 60 tablet 0    atorvastatin (LIPITOR) 40 MG tablet Take 1 tablet by mouth daily 90 tablet 0    rOPINIRole (REQUIP) 1 MG tablet TAKE 1 TABLET BY MOUTH THREE TIMES DAILY 90 tablet 3    ipratropium-albuterol (DUONEB) 0.5-2.5 (3) MG/3ML SOLN nebulizer solution Inhale 3 mLs into the lungs      fluticasone-salmeterol (ADVAIR) 250-50 MCG/DOSE AEPB Inhale 1 puff into the lungs every 12 hours 1 Inhaler 5    Blood Pressure Monitoring (SPHYGMOMANOMETER) MISC 1 Device by Does not apply route daily 1 each 0    albuterol sulfate HFA (VENTOLIN HFA) 108 (90 Base) MCG/ACT inhaler Inhale 2 puffs into the lungs every 6 hours as needed for Wheezing 1 Inhaler 5    fluticasone (FLONASE) 50 MCG/ACT nasal spray 2 sprays by Nasal route daily 1 Bottle 3    polyethyl glycol-propyl glycol 0.4-0.3 % (SYSTANE) 0.4-0.3 % ophthalmic solution 1 drop as needed for Dry Eyes       No current facility-administered medications for this visit. Review of Systems:   Review of Systems   Constitutional: Negative for appetite change, diaphoresis and fatigue. HENT: Negative for nosebleeds.  Restless leg syndrome    Arthritis    Fibromyalgia    Dyslipidemia    Osteopenia    At high risk for caregiver role strain    Class 3 obesity with body mass index (BMI) of 40.0 to 44.9 in adult    Chronic maxillary sinusitis    Deviated septum    Hypertrophy of nasal turbinates    Asthma with COPD (chronic obstructive pulmonary disease) (HCC)    Closed fracture of left ankle    Dysthymia    Moderate persistent asthma without complication    Hypoxia    Former smoker    Other emphysema (HCC)- moderate COPD- PFT 10/18    Bilateral lower extremity edema    Pulmonary hypertension, moderate to severe (HCC)    Pneumonia of left lower lobe due to infectious organism ([address]    Morbid obesity ([address]    Angina pectoris, unspecified ([address]           No orders of the defined types were placed in this encounter. No orders of the defined types were placed in this encounter. Assessment:    1. Congestive heart failure, unspecified HF chronicity, unspecified heart failure type ([address]    2. Essential hypertension    3. Bilateral lower extremity edema    4. Former smoker       Plan:   Stay on same medications. See me in 6 months. This note was partially generated using Dragon voice recognition system, and there may be some incorrect words, spellings, punctuation that were not noticed in checking the note before saving.         Electronically signed by Lesly Schultz MD on 5/19/2020 at 10:56 AM

## 2020-05-28 ENCOUNTER — VIRTUAL VISIT (OUTPATIENT)
Dept: PULMONOLOGY | Age: 80
End: 2020-05-28
Payer: MEDICARE

## 2020-05-28 PROCEDURE — G8926 SPIRO NO PERF OR DOC: HCPCS | Performed by: INTERNAL MEDICINE

## 2020-05-28 PROCEDURE — 4040F PNEUMOC VAC/ADMIN/RCVD: CPT | Performed by: INTERNAL MEDICINE

## 2020-05-28 PROCEDURE — 3023F SPIROM DOC REV: CPT | Performed by: INTERNAL MEDICINE

## 2020-05-28 PROCEDURE — 1036F TOBACCO NON-USER: CPT | Performed by: INTERNAL MEDICINE

## 2020-05-28 PROCEDURE — G8428 CUR MEDS NOT DOCUMENT: HCPCS | Performed by: INTERNAL MEDICINE

## 2020-05-28 PROCEDURE — 1123F ACP DISCUSS/DSCN MKR DOCD: CPT | Performed by: INTERNAL MEDICINE

## 2020-05-28 PROCEDURE — 1090F PRES/ABSN URINE INCON ASSESS: CPT | Performed by: INTERNAL MEDICINE

## 2020-05-28 PROCEDURE — G8417 CALC BMI ABV UP PARAM F/U: HCPCS | Performed by: INTERNAL MEDICINE

## 2020-05-28 PROCEDURE — 99214 OFFICE O/P EST MOD 30 MIN: CPT | Performed by: INTERNAL MEDICINE

## 2020-05-28 PROCEDURE — G8399 PT W/DXA RESULTS DOCUMENT: HCPCS | Performed by: INTERNAL MEDICINE

## 2020-05-28 ASSESSMENT — ENCOUNTER SYMPTOMS
COUGH: 0
EYE DISCHARGE: 0
CHEST TIGHTNESS: 0
ABDOMINAL PAIN: 0
EYE ITCHING: 0
SORE THROAT: 0
SINUS PRESSURE: 0
WHEEZING: 0
SHORTNESS OF BREATH: 0
NAUSEA: 0
VOMITING: 0
DIARRHEA: 0
TROUBLE SWALLOWING: 0
RHINORRHEA: 0
VOICE CHANGE: 0

## 2020-05-28 NOTE — PROGRESS NOTES
changes , exercise, behavioral modification. Return in about 4 months (around 9/28/2020) for barney, hypoxia on O2, asthma, COPD. Morena Lui is a 68 y.o. female being evaluated by a Virtual Visit (video visit) encounter to address concerns as mentioned above. A caregiver was present when appropriate. Due to this being a TeleHealth encounter (During [facility] public health emergency), evaluation of the following organ systems was limited: Vitals/Constitutional/EENT/Resp/CV/GI//MS/Neuro/Skin/Heme-Lymph-Imm. Pursuant to the emergency declaration under the [address] [place] [address], [address] [place] [address] authority and the [employer] Act, this Virtual Visit was conducted with patient's (and/or legal guardian's) consent, to reduce the patient's risk of exposure to COVID-19 and provide necessary medical care. The patient (and/or legal guardian) has also been advised to contact this office for worsening conditions or problems, and seek emergency medical treatment and/or call 911 if deemed necessary. Patient identification was verified at the start of the visit: Yes    Total time spent on this encounter: 27 min    Services were provided through a video synchronous discussion virtually to substitute for in-person clinic visit. Patient and provider were located at their individual homes. --Nikki Barnard MD on 5/28/2020 at 3:32 PM    An electronic signature was used to authenticate this note.

## 2020-06-05 RX ORDER — FLUTICASONE PROPIONATE 50 MCG
1 SPRAY, SUSPENSION (ML) NASAL DAILY
Qty: 48 G | Refills: 0 | Status: SHIPPED | OUTPATIENT
Start: 2020-06-05

## 2020-06-05 NOTE — TELEPHONE ENCOUNTER
Pharmacy is requesting medication refill.  Please approve or deny this request.    Rx requested:  Requested Prescriptions     Pending Prescriptions Disp Refills    fluticasone (FLONASE) 50 MCG/ACT nasal spray [Pharmacy Med Name: Fluticasone Propionate 50 MCG/ACT Nasal Suspension] 48 g 0     Si spray by Nasal route daily         Last Office Visit:   2020      Next Visit Date:  Future Appointments   Date Time Provider [facility]   2020  2:15 PM RAYO [facility] ROOM [address] [place] [address]   2020  1:00 PM Ankit Ye, [address] [facility],[address]   2020 10:30 AM Tyson Delatorre MD [facility] [facility]

## 2020-06-09 RX ORDER — BACLOFEN 20 MG/1
TABLET ORAL
Qty: 60 TABLET | Refills: 1 | Status: SHIPPED | OUTPATIENT
Start: 2020-06-09 | End: 2020-09-03

## 2020-06-09 NOTE — TELEPHONE ENCOUNTER
Pharmacy is requesting medication refill.  Please approve or deny this request.    Rx requested:  Requested Prescriptions     Pending Prescriptions Disp Refills    baclofen (LIORESAL) 20 MG tablet [Pharmacy Med Name: Baclofen 20 MG Oral Tablet] 60 tablet 1     Sig: Take 1 tablet by mouth twice daily         Last Office Visit:   2/28/2020      Next Visit Date:  Future Appointments   Date Time Provider Dulce Brunson   6/12/2020  2:15 PM RAYO [facility] ROOM [address] [place] [address]   9/30/2020  1:00 PM Ankit Ye, [address] [facility],[address]   11/17/2020 10:30 AM Tyson Delatorre MD [address]

## 2020-06-16 RX ORDER — ATORVASTATIN CALCIUM 40 MG/1
40 TABLET, FILM COATED ORAL DAILY
Qty: 90 TABLET | Refills: 0 | Status: SHIPPED | OUTPATIENT
Start: 2020-06-16 | End: 2020-08-17

## 2020-06-16 RX ORDER — OLMESARTAN MEDOXOMIL AND HYDROCHLOROTHIAZIDE 40/25 40; 25 MG/1; MG/1
1 TABLET ORAL DAILY
Qty: 60 TABLET | Refills: 0 | Status: SHIPPED | OUTPATIENT
Start: 2020-06-16 | End: 2022-02-14 | Stop reason: SDUPTHER

## 2020-06-16 NOTE — TELEPHONE ENCOUNTER
Pharmacy is requesting medication refill.  Please approve or deny this request.    Rx requested:  Requested Prescriptions     Pending Prescriptions Disp Refills    olmesartan-hydrochlorothiazide (BENICAR HCT) 40-25 MG per tablet [Pharmacy Med Name: Olmesartan Medoxomil-HCTZ 40-25 MG Oral Tablet] 60 tablet 0     Sig: Take 1 tablet by mouth daily    atorvastatin (LIPITOR) 40 MG tablet [Pharmacy Med Name: Atorvastatin Calcium 40 MG Oral Tablet] 90 tablet 0     Sig: Take 1 tablet by mouth daily         Last Office Visit:   2/28/2020      Next Visit Date:  Future Appointments   Date Time Provider Dulce Brunson   9/30/2020  1:00 PM Ankit Ye, [address] [facility],[address]   11/17/2020 10:30 AM Tyson Delatorre MD [address]

## 2020-08-17 RX ORDER — HYDROCHLOROTHIAZIDE 25 MG/1
25 TABLET ORAL DAILY
Qty: 60 TABLET | Refills: 0 | Status: SHIPPED | OUTPATIENT
Start: 2020-08-17 | End: 2020-11-18

## 2020-08-17 RX ORDER — ATORVASTATIN CALCIUM 40 MG/1
40 TABLET, FILM COATED ORAL DAILY
Qty: 90 TABLET | Refills: 0 | Status: SHIPPED | OUTPATIENT
Start: 2020-08-17 | End: 2020-12-24

## 2020-08-17 RX ORDER — ROPINIROLE 1 MG/1
TABLET, FILM COATED ORAL
Qty: 90 TABLET | Refills: 0 | Status: SHIPPED | OUTPATIENT
Start: 2020-08-17 | End: 2020-10-06

## 2020-08-17 RX ORDER — OLMESARTAN MEDOXOMIL 40 MG/1
40 TABLET ORAL DAILY
Qty: 60 TABLET | Refills: 0 | Status: SHIPPED | OUTPATIENT
Start: 2020-08-17 | End: 2020-10-16

## 2020-08-17 NOTE — TELEPHONE ENCOUNTER
Pharmacy is requesting medication refill.  Please approve or deny this request.    Rx requested:  Requested Prescriptions     Pending Prescriptions Disp Refills    olmesartan (BENICAR) 40 MG tablet [Pharmacy Med Name: Olmesartan Medoxomil 40 MG Oral Tablet] 60 tablet 0     Sig: Take 1 tablet by mouth daily    rOPINIRole (REQUIP) 1 MG tablet [Pharmacy Med Name: rOPINIRole HCl 1 MG Oral Tablet] 90 tablet 0     Sig: TAKE 1 TABLET BY MOUTH THREE TIMES DAILY    hydroCHLOROthiazide (HYDRODIURIL) 25 MG tablet [Pharmacy Med Name: hydroCHLOROthiazide 25 MG Oral Tablet] 60 tablet 0     Sig: Take 1 tablet by mouth daily    atorvastatin (LIPITOR) 40 MG tablet [Pharmacy Med Name: Atorvastatin Calcium 40 MG Oral Tablet] 90 tablet 0     Sig: Take 1 tablet by mouth daily         Last Office Visit:   2/28/2020      Next Visit Date:  Future Appointments   Date Time Provider Dulce Brunson   9/30/2020  1:00 PM Aminah Milner, [address] [facility]   11/17/2020 10:30 AM Imani Beard MD [address]

## 2020-09-03 RX ORDER — BACLOFEN 20 MG/1
20 TABLET ORAL 2 TIMES DAILY
Qty: 60 TABLET | Refills: 0 | Status: SHIPPED | OUTPATIENT
Start: 2020-09-03 | End: 2020-11-03

## 2020-09-03 NOTE — TELEPHONE ENCOUNTER
Pharmacy is requesting medication refill.  Please approve or deny this request.    Rx requested:  Requested Prescriptions     Pending Prescriptions Disp Refills    baclofen (LIORESAL) 20 MG tablet [Pharmacy Med Name: Baclofen 20 MG Oral Tablet] 60 tablet 0     Sig: Take 1 tablet by mouth 2 times daily         Last Office Visit:   2/28/2020      Next Visit Date:  Future Appointments   Date Time Provider Dulce Brunson   9/30/2020  1:00 PM Stanley Castellanos, [address] [facility]   11/17/2020 10:30 AM Donal Lima MD [address]

## 2020-10-06 RX ORDER — ROPINIROLE 1 MG/1
TABLET, FILM COATED ORAL
Qty: 90 TABLET | Refills: 0 | Status: SHIPPED | OUTPATIENT
Start: 2020-10-06 | End: 2020-11-27

## 2020-10-06 NOTE — TELEPHONE ENCOUNTER
Pharmacy is requesting medication refill.  Please approve or deny this request.    Rx requested:  Requested Prescriptions     Pending Prescriptions Disp Refills    rOPINIRole (REQUIP) 1 MG tablet [Pharmacy Med Name: rOPINIRole HCl 1 MG Oral Tablet] 90 tablet 0     Sig: TAKE 1 TABLET BY MOUTH THREE TIMES DAILY         Last Office Visit:   2/28/2020      Next Visit Date:  Future Appointments   Date Time Provider Dulce Brunson   11/17/2020 10:30 AM Renu Ha MD [address]

## 2020-10-16 RX ORDER — OLMESARTAN MEDOXOMIL 40 MG/1
TABLET ORAL
Qty: 60 TABLET | Refills: 0 | Status: SHIPPED | OUTPATIENT
Start: 2020-10-16 | End: 2020-12-08

## 2020-10-16 NOTE — TELEPHONE ENCOUNTER
Pharmacy  requesting medication refill.  Please approve or deny this request.    Rx requested:  Requested Prescriptions     Pending Prescriptions Disp Refills    olmesartan (BENICAR) 40 MG tablet [Pharmacy Med Name: Olmesartan Medoxomil 40 MG Oral Tablet] 60 tablet 0     Sig: Take 1 tablet by mouth once daily         Last Office Visit:   2/28/2020      Next Visit Date:  Future Appointments   Date Time Provider Dulce Brunson   11/17/2020 10:30 AM Mack Bailey MD [address]

## 2020-11-03 RX ORDER — BACLOFEN 20 MG/1
20 TABLET ORAL 2 TIMES DAILY
Qty: 60 TABLET | Refills: 0 | OUTPATIENT
Start: 2020-11-03

## 2020-11-18 NOTE — TELEPHONE ENCOUNTER
Pharmacy is requesting medication refill. Please approve or deny this request.    Rx requested:  Requested Prescriptions     Pending Prescriptions Disp Refills    hydroCHLOROthiazide (HYDRODIURIL) 25 MG tablet [Pharmacy Med Name: hydroCHLOROthiazide 25 MG Oral Tablet] 60 tablet 0     Sig: Take 1 tablet by mouth daily         Last Office Visit:   2/28/2020      Next Visit Date:  No future appointments.

## 2020-11-19 RX ORDER — HYDROCHLOROTHIAZIDE 25 MG/1
25 TABLET ORAL DAILY
Qty: 60 TABLET | Refills: 0 | Status: SHIPPED | OUTPATIENT
Start: 2020-11-19 | End: 2021-03-26

## 2020-11-27 RX ORDER — ROPINIROLE 1 MG/1
TABLET, FILM COATED ORAL
Qty: 90 TABLET | Refills: 0 | Status: SHIPPED | OUTPATIENT
Start: 2020-11-27 | End: 2021-01-21

## 2020-11-27 NOTE — TELEPHONE ENCOUNTER
Pharmacy is requesting medication refill. Please approve or deny this request.    Rx requested:  Requested Prescriptions     Pending Prescriptions Disp Refills    rOPINIRole (REQUIP) 1 MG tablet [Pharmacy Med Name: rOPINIRole HCl 1 MG Oral Tablet] 90 tablet 0     Sig: TAKE 1 TABLET BY MOUTH THREE TIMES DAILY         Last Office Visit:   2/28/2020      Next Visit Date:  No future appointments.

## 2020-12-08 RX ORDER — OLMESARTAN MEDOXOMIL 40 MG/1
40 TABLET ORAL DAILY
Qty: 60 TABLET | Refills: 0 | Status: SHIPPED | OUTPATIENT
Start: 2020-12-08 | End: 2021-02-11

## 2020-12-08 NOTE — TELEPHONE ENCOUNTER
Pharmacy is requesting medication refill. Please approve or deny this request.    Rx requested:  Requested Prescriptions     Pending Prescriptions Disp Refills    sertraline (ZOLOFT) 50 MG tablet [Pharmacy Med Name: Sertraline HCl 50 MG Oral Tablet] 90 tablet 0     Sig: Take 1 tablet by mouth daily    olmesartan (BENICAR) 40 MG tablet [Pharmacy Med Name: Olmesartan Medoxomil 40 MG Oral Tablet] 60 tablet 0     Sig: Take 1 tablet by mouth daily         Last Office Visit:   2/28/2020      Next Visit Date:  No future appointments.

## 2020-12-10 NOTE — TELEPHONE ENCOUNTER
Pharmacy is requesting medication refill. Please approve or deny this request.    Rx requested:  Requested Prescriptions     Pending Prescriptions Disp Refills    sertraline (ZOLOFT) 50 MG tablet [Pharmacy Med Name: Sertraline HCl 50 MG Oral Tablet] 90 tablet 0     Sig: Take 1 tablet by mouth daily         Last Office Visit:   2/28/2020      Next Visit Date:  No future appointments.

## 2020-12-23 NOTE — TELEPHONE ENCOUNTER
Pharmacy is  requesting medication refill. Please approve or deny this request.    Rx requested:  Requested Prescriptions     Pending Prescriptions Disp Refills    atorvastatin (LIPITOR) 40 MG tablet [Pharmacy Med Name: Atorvastatin Calcium 40 MG Oral Tablet] 90 tablet 0     Sig: Take 1 tablet by mouth once daily         Last Office Visit:   2/28/2020      Next Visit Date:  No future appointments.

## 2020-12-24 RX ORDER — ATORVASTATIN CALCIUM 40 MG/1
TABLET, FILM COATED ORAL
Qty: 90 TABLET | Refills: 0 | Status: SHIPPED | OUTPATIENT
Start: 2020-12-24 | End: 2021-03-26

## 2021-01-04 DIAGNOSIS — M62.838 MUSCLE SPASM: ICD-10-CM

## 2021-01-05 RX ORDER — BACLOFEN 20 MG/1
20 TABLET ORAL 2 TIMES DAILY
Qty: 60 TABLET | Refills: 0 | Status: SHIPPED | OUTPATIENT
Start: 2021-01-05 | End: 2021-02-18

## 2021-01-05 NOTE — TELEPHONE ENCOUNTER
Pharmacy is requesting medication refill. Please approve or deny this request.    Rx requested:  Requested Prescriptions     Pending Prescriptions Disp Refills    baclofen (LIORESAL) 20 MG tablet [Pharmacy Med Name: BACLOFEN 20MG       TAB] 60 tablet 0     Sig: Take 1 tablet by mouth 2 times daily         Last Office Visit:   Visit date not found      Next Visit Date:  No future appointments.

## 2021-01-19 DIAGNOSIS — G25.81 RESTLESS LEG SYNDROME: ICD-10-CM

## 2021-01-20 NOTE — TELEPHONE ENCOUNTER
Patient called to see if rx had been addressed, as RLS is uncomfortable and she doesn't want to be without. She has a day & a half of meds left.     Thank Pete Tavarez.

## 2021-01-21 RX ORDER — ROPINIROLE 1 MG/1
TABLET, FILM COATED ORAL
Qty: 90 TABLET | Refills: 0 | Status: SHIPPED | OUTPATIENT
Start: 2021-01-21 | End: 2021-03-16

## 2021-02-10 NOTE — TELEPHONE ENCOUNTER
Pharmacy is  requesting medication refill. Please approve or deny this request.    Rx requested:  Requested Prescriptions     Pending Prescriptions Disp Refills    olmesartan (BENICAR) 40 MG tablet [Pharmacy Med Name: Olmesartan Medoxomil 40 MG Oral Tablet] 60 tablet 0     Sig: Take 1 tablet by mouth once daily         Last Office Visit:   2/28/2020      Next Visit Date:  No future appointments.

## 2021-02-11 RX ORDER — OLMESARTAN MEDOXOMIL 40 MG/1
TABLET ORAL
Qty: 60 TABLET | Refills: 2 | Status: SHIPPED | OUTPATIENT
Start: 2021-02-11 | End: 2021-07-09

## 2021-02-17 DIAGNOSIS — M62.838 MUSCLE SPASM: ICD-10-CM

## 2021-02-17 NOTE — TELEPHONE ENCOUNTER
Pharmacy  requesting medication refill. Please approve or deny this request.    Rx requested:  Requested Prescriptions     Pending Prescriptions Disp Refills    baclofen (LIORESAL) 20 MG tablet [Pharmacy Med Name: BACLOFEN 20MG       TAB] 60 tablet 0     Sig: Take 1 tablet by mouth twice daily         Last Office Visit:   2/28/2020      Next Visit Date:  No future appointments.

## 2021-02-18 RX ORDER — BACLOFEN 20 MG/1
TABLET ORAL
Qty: 60 TABLET | Refills: 0 | Status: SHIPPED | OUTPATIENT
Start: 2021-02-18 | End: 2021-03-26

## 2021-03-15 DIAGNOSIS — F41.1 GENERALIZED ANXIETY DISORDER: ICD-10-CM

## 2021-03-15 NOTE — TELEPHONE ENCOUNTER
Pharmacy is  requesting medication refill. Please approve or deny this request.    Rx requested:  Requested Prescriptions     Pending Prescriptions Disp Refills    sertraline (ZOLOFT) 50 MG tablet [Pharmacy Med Name: Sertraline HCl 50 MG Oral Tablet] 90 tablet 0     Sig: Take 1 tablet by mouth once daily         Last Office Visit:   2/28/2020      Next Visit Date:  No future appointments.

## 2021-03-16 DIAGNOSIS — G25.81 RESTLESS LEG SYNDROME: ICD-10-CM

## 2021-03-16 RX ORDER — ROPINIROLE 1 MG/1
TABLET, FILM COATED ORAL
Qty: 90 TABLET | Refills: 0 | Status: SHIPPED | OUTPATIENT
Start: 2021-03-16 | End: 2021-05-13

## 2021-03-25 DIAGNOSIS — E78.5 DYSLIPIDEMIA: ICD-10-CM

## 2021-03-25 DIAGNOSIS — M62.838 MUSCLE SPASM: ICD-10-CM

## 2021-03-26 RX ORDER — HYDROCHLOROTHIAZIDE 25 MG/1
TABLET ORAL
Qty: 60 TABLET | Refills: 1 | Status: SHIPPED | OUTPATIENT
Start: 2021-03-26 | End: 2021-07-16

## 2021-03-26 RX ORDER — ATORVASTATIN CALCIUM 40 MG/1
TABLET, FILM COATED ORAL
Qty: 90 TABLET | Refills: 0 | Status: SHIPPED | OUTPATIENT
Start: 2021-03-26 | End: 2021-06-21

## 2021-03-26 RX ORDER — BACLOFEN 20 MG/1
TABLET ORAL
Qty: 60 TABLET | Refills: 0 | Status: SHIPPED | OUTPATIENT
Start: 2021-03-26 | End: 2021-05-03

## 2021-03-26 NOTE — TELEPHONE ENCOUNTER
Pharmacy is  requesting medication refill. Please approve or deny this request.    Rx requested:  Requested Prescriptions     Pending Prescriptions Disp Refills    hydroCHLOROthiazide (HYDRODIURIL) 25 MG tablet [Pharmacy Med Name: hydroCHLOROthiazide 25 MG Oral Tablet] 60 tablet 0     Sig: Take 1 tablet by mouth once daily         Last Office Visit:   2/28/2020      Next Visit Date:  No future appointments.

## 2021-03-26 NOTE — TELEPHONE ENCOUNTER
Pharmacy is  requesting medication refill. Please approve or deny this request.    Rx requested:  Requested Prescriptions     Pending Prescriptions Disp Refills    baclofen (LIORESAL) 20 MG tablet [Pharmacy Med Name: Baclofen 20 MG Oral Tablet] 60 tablet 0     Sig: Take 1 tablet by mouth twice daily    atorvastatin (LIPITOR) 40 MG tablet [Pharmacy Med Name: Atorvastatin Calcium 40 MG Oral Tablet] 90 tablet 0     Sig: Take 1 tablet by mouth once daily         Last Office Visit:   2/28/2020      Next Visit Date:  No future appointments.

## 2021-04-30 DIAGNOSIS — M62.838 MUSCLE SPASM: ICD-10-CM

## 2021-05-01 NOTE — TELEPHONE ENCOUNTER
Pharmacy is  requesting medication refill.  Please approve or deny this request.    Rx requested:  Requested Prescriptions     Pending Prescriptions Disp Refills    baclofen (LIORESAL) 20 MG tablet [Pharmacy Med Name: Baclofen 20 MG Oral Tablet] 60 tablet 0     Sig: Take 1 tablet by mouth twice daily         Last Office Visit:   2/28/2020      Next Visit Date:  Future Appointments   Date Time Provider Dulce Brunson   6/3/2021 11:10 AM [facility]

## 2021-05-03 RX ORDER — BACLOFEN 20 MG/1
TABLET ORAL
Qty: 60 TABLET | Refills: 0 | Status: SHIPPED | OUTPATIENT
Start: 2021-05-03 | End: 2021-06-03

## 2021-05-13 DIAGNOSIS — G25.81 RESTLESS LEG SYNDROME: ICD-10-CM

## 2021-05-13 RX ORDER — ROPINIROLE 1 MG/1
TABLET, FILM COATED ORAL
Qty: 90 TABLET | Refills: 0 | Status: SHIPPED | OUTPATIENT
Start: 2021-05-13 | End: 2021-07-15

## 2021-05-13 NOTE — TELEPHONE ENCOUNTER
Requesting medication refill.  Please approve or deny this request.    Rx requested:  Requested Prescriptions     Pending Prescriptions Disp Refills    rOPINIRole (REQUIP) 1 MG tablet [Pharmacy Med Name: rOPINIRole HCl 1 MG Oral Tablet] 90 tablet 0     Sig: TAKE 1 TABLET BY MOUTH THREE TIMES DAILY       Last Office Visit:   2/28/2020    Last Filled:      Last Labs:      Next Visit Date:  Future Appointments   Date Time Provider Dulce Brunson   6/3/2021 11:10 AM [facility]

## 2021-06-03 DIAGNOSIS — M62.838 MUSCLE SPASM: ICD-10-CM

## 2021-06-03 RX ORDER — BACLOFEN 20 MG/1
20 TABLET ORAL 2 TIMES DAILY
Qty: 60 TABLET | Refills: 0 | Status: SHIPPED | OUTPATIENT
Start: 2021-06-03 | End: 2021-07-08

## 2021-06-07 ENCOUNTER — VIRTUAL VISIT (OUTPATIENT)
Dept: FAMILY MEDICINE CLINIC | Age: 81
End: 2021-06-07
Payer: MEDICARE

## 2021-06-07 DIAGNOSIS — E78.5 DYSLIPIDEMIA: ICD-10-CM

## 2021-06-07 DIAGNOSIS — R60.0 EDEMA OF LEFT LOWER EXTREMITY: ICD-10-CM

## 2021-06-07 DIAGNOSIS — R06.01 ORTHOPNEA: ICD-10-CM

## 2021-06-07 DIAGNOSIS — F41.1 GENERALIZED ANXIETY DISORDER: Primary | ICD-10-CM

## 2021-06-07 DIAGNOSIS — I20.9 ANGINA PECTORIS, UNSPECIFIED (HCC): ICD-10-CM

## 2021-06-07 DIAGNOSIS — J44.9 ASTHMA WITH COPD (CHRONIC OBSTRUCTIVE PULMONARY DISEASE) (HCC): ICD-10-CM

## 2021-06-07 DIAGNOSIS — I27.20 PULMONARY HYPERTENSION, MODERATE TO SEVERE (HCC): ICD-10-CM

## 2021-06-07 DIAGNOSIS — E66.01 MORBID OBESITY (HCC): ICD-10-CM

## 2021-06-07 DIAGNOSIS — R73.09 ELEVATED GLUCOSE: ICD-10-CM

## 2021-06-07 DIAGNOSIS — J30.1 SEASONAL ALLERGIC RHINITIS DUE TO POLLEN: ICD-10-CM

## 2021-06-07 DIAGNOSIS — F33.1 MODERATE EPISODE OF RECURRENT MAJOR DEPRESSIVE DISORDER (HCC): ICD-10-CM

## 2021-06-07 PROCEDURE — 4040F PNEUMOC VAC/ADMIN/RCVD: CPT | Performed by: NURSE PRACTITIONER

## 2021-06-07 PROCEDURE — 1123F ACP DISCUSS/DSCN MKR DOCD: CPT | Performed by: NURSE PRACTITIONER

## 2021-06-07 PROCEDURE — 99214 OFFICE O/P EST MOD 30 MIN: CPT | Performed by: NURSE PRACTITIONER

## 2021-06-07 PROCEDURE — G8427 DOCREV CUR MEDS BY ELIG CLIN: HCPCS | Performed by: NURSE PRACTITIONER

## 2021-06-07 PROCEDURE — 1090F PRES/ABSN URINE INCON ASSESS: CPT | Performed by: NURSE PRACTITIONER

## 2021-06-07 PROCEDURE — G8399 PT W/DXA RESULTS DOCUMENT: HCPCS | Performed by: NURSE PRACTITIONER

## 2021-06-07 RX ORDER — FEXOFENADINE HCL 180 MG/1
180 TABLET ORAL DAILY
Qty: 90 TABLET | Refills: 1 | Status: SHIPPED | OUTPATIENT
Start: 2021-06-07

## 2021-06-07 SDOH — ECONOMIC STABILITY: TRANSPORTATION INSECURITY
IN THE PAST 12 MONTHS, HAS THE LACK OF TRANSPORTATION KEPT YOU FROM MEDICAL APPOINTMENTS OR FROM GETTING MEDICATIONS?: NO

## 2021-06-07 SDOH — ECONOMIC STABILITY: FOOD INSECURITY: WITHIN THE PAST 12 MONTHS, THE FOOD YOU BOUGHT JUST DIDN'T LAST AND YOU DIDN'T HAVE MONEY TO GET MORE.: NEVER TRUE

## 2021-06-07 SDOH — ECONOMIC STABILITY: TRANSPORTATION INSECURITY
IN THE PAST 12 MONTHS, HAS LACK OF TRANSPORTATION KEPT YOU FROM MEETINGS, WORK, OR FROM GETTING THINGS NEEDED FOR DAILY LIVING?: NO

## 2021-06-07 SDOH — ECONOMIC STABILITY: FOOD INSECURITY: WITHIN THE PAST 12 MONTHS, YOU WORRIED THAT YOUR FOOD WOULD RUN OUT BEFORE YOU GOT MONEY TO BUY MORE.: NEVER TRUE

## 2021-06-07 ASSESSMENT — SOCIAL DETERMINANTS OF HEALTH (SDOH): HOW HARD IS IT FOR YOU TO PAY FOR THE VERY BASICS LIKE FOOD, HOUSING, MEDICAL CARE, AND HEATING?: NOT HARD AT ALL

## 2021-06-07 NOTE — PROGRESS NOTES
baseline breathing. Dyslipidemia: she now weight around 201 lbs. has been trying to eat healthier overall and does get some routine physical activity around the home. She has not followed with any specialists or myself for the past year or so due to pandemic situation. She has recently gotten her second vaccination and is ready to come back to the office and have blood work done in the near future. Left lower extremity edema/orthopnea: She states that she has noticed some swelling to her left leg that seems to be rather significant at times. No redness or warmth noted. She does admit to having a difficult time breathing when she lays down and does sit upright often times to sleep. No chest congestion reported. She does not report any pain to the left leg. No numbness or tingling reported either. Review of Systems   This patient reports no chest pain or pressure. There is no shortness of breath or cough. The patient reports no nausea or vomiting. There is no heartburn or indigestion. There is no diarrhea or constipation. No black, bloody, mucusy or tarry stool noticed. The patient reports no bloating and no change in appetite. Prior to Visit Medications    Medication Sig Taking?  Authorizing Provider   fexofenadine (ALLEGRA) 180 MG tablet Take 1 tablet by mouth daily Yes JUAN Shelton CNP   baclofen (LIORESAL) 20 MG tablet Take 1 tablet by mouth 2 times daily Yes JUAN Sehlton CNP   rOPINIRole (REQUIP) 1 MG tablet TAKE 1 TABLET BY MOUTH THREE TIMES DAILY Yes JUAN Shelton CNP   atorvastatin (LIPITOR) 40 MG tablet Take 1 tablet by mouth once daily Yes JUAN Shelton CNP   hydroCHLOROthiazide (HYDRODIURIL) 25 MG tablet Take 1 tablet by mouth once daily Yes JUAN Shelton CNP   sertraline (ZOLOFT) 50 MG tablet Take 1 tablet by mouth once daily Yes JUAN Shelton CNP   olmesartan (BENICAR) 40 MG tablet Take 1 tablet by mouth once daily Yes [place] JUAN Ruiz CNP   olmesartan-hydrochlorothiazide (BENICAR HCT) 40-25 MG per tablet Take 1 tablet by mouth daily Yes [place] JUAN Ruiz CNP   fluticasone (FLONASE) 50 MCG/ACT nasal spray 1 spray by Nasal route daily Yes [place] JUAN Ruiz CNP   ipratropium-albuterol (DUONEB) 0.5-2.5 (3) MG/3ML SOLN nebulizer solution Inhale 3 mLs into the lungs Yes Historical Provider, MD   polyethyl glycol-propyl glycol 0.4-0.3 % (SYSTANE) 0.4-0.3 % ophthalmic solution 1 drop as needed for Dry Eyes Yes Historical Provider, MD   fluticasone-salmeterol (ADVAIR) 250-50 MCG/DOSE AEPB Inhale 1 puff into the lungs every 12 hours  Patient not taking: Reported on 2021  JUAN Harding CNP   Blood Pressure Monitoring QUARTERMAIN) MISC 1 Device by Does not apply route daily  Patient not taking: Reported on 2021  [place] JUAN Ruiz CNP   albuterol sulfate HFA (VENTOLIN HFA) 108 (90 Base) MCG/ACT inhaler Inhale 2 puffs into the lungs every 6 hours as needed for Wheezing  Patient not taking: Reported on 2021  Layo Botello MD       Social History     Tobacco Use    Smoking status: Former Smoker     Packs/day: 2.00     Years: 20.00     Pack years: 40.00     Types: Cigarettes     Quit date: 3/7/1990     Years since quittin.2    Smokeless tobacco: Never Used    Tobacco comment: quit 25 years ago   Vaping Use    Vaping Use: Never used   Substance Use Topics    Alcohol use: Yes     Comment: occ    Drug use: No        PHYSICAL EXAMINATION:  [ INSTRUCTIONS:  \"[x]\" Indicates a positive item  \"[]\" Indicates a negative item  -- DELETE ALL ITEMS NOT EXAMINED]  [x] Alert  [x] Oriented to person/place/time    [x] No apparent distress  [] Toxic appearing    [] Face flushed appearing [] Sclera clear  [] Lips are cyanotic      [x] Breathing appears normal  [] Appears tachypneic      [] Rash on visible skin    [x] Cranial Nerves II-XII grossly intact    [x] Motor grossly intact in visible upper extremities    [] Motor grossly intact in visible lower extremities    [x] Normal Mood  [] Anxious appearing    [] Depressed appearing  [] Confused appearing      [] Poor short term memory  [] Poor long term memory    [] OTHER:      Due to this being a TeleHealth encounter, evaluation of the following organ systems is limited: Vitals/Constitutional/EENT/Resp/CV/GI//MS/Neuro/Skin/Heme-Lymph-Imm. ASSESSMENT/PLAN:   Diagnosis Orders   1. Generalized anxiety disorder     2. Moderate episode of recurrent major depressive disorder ([facility] [address]     3. Asthma with COPD (chronic obstructive pulmonary disease) ([facility] [address]     4. Pulmonary hypertension, moderate to severe ([facility] [address]     5. Morbid obesity ([facility] [address]     6. Angina pectoris, unspecified ([facility] [address]     7. Dyslipidemia  CBC Auto Differential    Comprehensive Metabolic Panel    Lipid Panel   8. Seasonal allergic rhinitis due to pollen  fexofenadine (ALLEGRA) 180 MG tablet   9. Elevated glucose  Hemoglobin A1C   10. Edema of left lower extremity     11. Orthopnea  Brain Natriuretic Peptide    Troponin         Return in about 6 weeks (around 7/19/2021) for in office- level 2.     1.  2.  Mood has been stable on current medication. No side effects reported. Continue the same. 3.  4.   10. 11. She continues to wear oxygen on a routine basis but has developed some left lower extremity edema and difficulty breathing while laying down. I do recommend getting BMP with next lab. No reported symptoms of DVT but will assess in the office next month. Continue current medication as prescribed and I do advise that she get back in with her specialists now that pandemic situation is better and she has had her vaccinations. Go to ER if symptoms worsen. 5.  Weight has been stable and she has lost some weight since her last visit. She is commended for this. 6.  No recent chest pain or pressure symptoms reported. 7.  We will plan to get fasting blood work prior to next visit.   Continue current medication. 8.  We will try on Allegra daily and continue other medications. Notify me if symptoms do not improve or worsen. 9.  We will plan to get hemoglobin A1c with next lab. Please note this report has been partially produced using speech recognition software and may cause contain errors related to that system including grammar, punctuation and spelling as well as words and phrases that may seem inappropriate. If there are questions or concerns please feel free to contact me to clarify. An  electronic signature was used to authenticate this note. --JUAN Arevalo CNP on 6/7/2021 at 3:22 PM        Pursuant to the emergency declaration under the [facility] [address] waiver authority and the [employer] and [employer] Act, this Virtual  Visit was conducted, with patient's consent, to reduce the patient's risk of exposure to COVID-19 and provide continuity of care for an established patient. Services were provided through a video synchronous discussion virtually to substitute for in-person clinic visit.

## 2021-06-21 DIAGNOSIS — E78.5 DYSLIPIDEMIA: ICD-10-CM

## 2021-06-21 RX ORDER — ATORVASTATIN CALCIUM 40 MG/1
TABLET, FILM COATED ORAL
Qty: 90 TABLET | Refills: 0 | Status: SHIPPED | OUTPATIENT
Start: 2021-06-21 | End: 2021-09-21

## 2021-06-21 NOTE — TELEPHONE ENCOUNTER
Requesting medication refill.  Please approve or deny this request.    Rx requested:  Requested Prescriptions     Pending Prescriptions Disp Refills    atorvastatin (LIPITOR) 40 MG tablet [Pharmacy Med Name: Atorvastatin Calcium 40 MG Oral Tablet] 90 tablet 0     Sig: Take 1 tablet by mouth once daily       Last Office Visit:   6/7/2021    Last Filled:      Last Labs:      Next Visit Date:  Future Appointments   Date Time Provider Dulce Brunson   7/20/2021  2:20 PM Lynita Leventhal, [facility] - [facility]

## 2021-06-29 ENCOUNTER — HOSPITAL ENCOUNTER (OUTPATIENT)
Dept: LAB | Age: 81
Discharge: HOME | End: 2021-06-29
Payer: MEDICARE

## 2021-06-29 DIAGNOSIS — R73.09 ELEVATED GLUCOSE: ICD-10-CM

## 2021-06-29 DIAGNOSIS — E78.5 DYSLIPIDEMIA: ICD-10-CM

## 2021-06-29 DIAGNOSIS — R06.01 ORTHOPNEA: ICD-10-CM

## 2021-06-29 LAB
ALBUMIN SERPL-MCNC: 4.2 G/DL (ref 3.5–4.6)
ALP BLD-CCNC: 61 U/L (ref 40–130)
ALT SERPL-CCNC: 15 U/L (ref 0–33)
ANION GAP SERPL CALCULATED.3IONS-SCNC: 12 MEQ/L (ref 9–15)
AST SERPL-CCNC: 21 U/L (ref 0–35)
BASOPHILS ABSOLUTE: 0 K/UL (ref 0–0.2)
BASOPHILS RELATIVE PERCENT: 0.5 %
BILIRUB SERPL-MCNC: 0.4 MG/DL (ref 0.2–0.7)
BUN BLDV-MCNC: 18 MG/DL (ref 8–23)
CALCIUM SERPL-MCNC: 10 MG/DL (ref 8.5–9.9)
CHLORIDE BLD-SCNC: 101 MEQ/L (ref 95–107)
CHOLESTEROL, TOTAL: 118 MG/DL (ref 0–199)
CO2: 32 MEQ/L (ref 20–31)
CREAT SERPL-MCNC: 0.94 MG/DL (ref 0.5–0.9)
EOSINOPHILS ABSOLUTE: 0.2 K/UL (ref 0–0.7)
EOSINOPHILS RELATIVE PERCENT: 2.7 %
GFR AFRICAN AMERICAN: >60
GFR NON-AFRICAN AMERICAN: 57.4
GLOBULIN: 3.1 G/DL (ref 2.3–3.5)
GLUCOSE BLD-MCNC: 117 MG/DL (ref 70–99)
HBA1C MFR BLD: 5.5 % (ref 4.8–5.9)
HCT VFR BLD CALC: 33.3 % (ref 37–47)
HDLC SERPL-MCNC: 36 MG/DL (ref 40–59)
HEMOGLOBIN: 11.1 G/DL (ref 12–16)
LDL CHOLESTEROL CALCULATED: 56 MG/DL (ref 0–129)
LYMPHOCYTES ABSOLUTE: 2.2 K/UL (ref 1–4.8)
LYMPHOCYTES RELATIVE PERCENT: 29.7 %
MCH RBC QN AUTO: 30.6 PG (ref 27–31.3)
MCHC RBC AUTO-ENTMCNC: 33.3 % (ref 33–37)
MCV RBC AUTO: 92 FL (ref 82–100)
MONOCYTES ABSOLUTE: 0.5 K/UL (ref 0.2–0.8)
MONOCYTES RELATIVE PERCENT: 7 %
NEUTROPHILS ABSOLUTE: 4.4 K/UL (ref 1.4–6.5)
NEUTROPHILS RELATIVE PERCENT: 60.1 %
PDW BLD-RTO: 14.8 % (ref 11.5–14.5)
PLATELET # BLD: 183 K/UL (ref 130–400)
POTASSIUM SERPL-SCNC: 3.9 MEQ/L (ref 3.4–4.9)
PRO-BNP: 89 PG/ML
RBC # BLD: 3.63 M/UL (ref 4.2–5.4)
SODIUM BLD-SCNC: 145 MEQ/L (ref 135–144)
TOTAL PROTEIN: 7.3 G/DL (ref 6.3–8)
TRIGL SERPL-MCNC: 131 MG/DL (ref 0–150)
TROPONIN: <0.01 NG/ML (ref 0–0.01)
WBC # BLD: 7.3 K/UL (ref 4.8–10.8)

## 2021-06-29 PROCEDURE — 83036 HEMOGLOBIN GLYCOSYLATED A1C: CPT

## 2021-06-29 PROCEDURE — 36415 COLL VENOUS BLD VENIPUNCTURE: CPT

## 2021-06-29 PROCEDURE — 83880 ASSAY OF NATRIURETIC PEPTIDE: CPT

## 2021-06-29 PROCEDURE — 80053 COMPREHEN METABOLIC PANEL: CPT

## 2021-06-29 PROCEDURE — 84484 ASSAY OF TROPONIN QUANT: CPT

## 2021-06-29 PROCEDURE — 80061 LIPID PANEL: CPT

## 2021-06-29 PROCEDURE — 85025 COMPLETE CBC W/AUTO DIFF WBC: CPT

## 2021-07-07 DIAGNOSIS — M62.838 MUSCLE SPASM: ICD-10-CM

## 2021-07-08 RX ORDER — BACLOFEN 20 MG/1
TABLET ORAL
Qty: 60 TABLET | Refills: 0 | Status: SHIPPED | OUTPATIENT
Start: 2021-07-08 | End: 2021-08-16

## 2021-07-08 NOTE — TELEPHONE ENCOUNTER
Requesting medication refill.  Please approve or deny this request.    Rx requested:  Requested Prescriptions     Pending Prescriptions Disp Refills    baclofen (LIORESAL) 20 MG tablet [Pharmacy Med Name: Baclofen 20 MG Oral Tablet] 60 tablet 0     Sig: Take 1 tablet by mouth twice daily       Last Office Visit:   6/7/2021    Last Filled:      Last Labs:      Next Visit Date:  Future Appointments   Date Time Provider Dulce Brunson   7/20/2021  2:20 PM Albert Franco[facility]

## 2021-07-09 RX ORDER — OLMESARTAN MEDOXOMIL 40 MG/1
TABLET ORAL
Qty: 60 TABLET | Refills: 0 | Status: SHIPPED | OUTPATIENT
Start: 2021-07-09 | End: 2021-08-13

## 2021-07-09 NOTE — TELEPHONE ENCOUNTER
Requesting medication refill.  Please approve or deny this request.    Rx requested:  Requested Prescriptions     Pending Prescriptions Disp Refills    olmesartan (BENICAR) 40 MG tablet [Pharmacy Med Name: Olmesartan Medoxomil 40 MG Oral Tablet] 60 tablet 0     Sig: Take 1 tablet by mouth once daily       Last Office Visit:   6/7/2021    Last Filled:      Last Labs:      Next Visit Date:  Future Appointments   Date Time Provider Dulce Brunson   7/20/2021  2:20 PM Prabhu Brandon, APRN - CNP [facility]

## 2021-07-14 DIAGNOSIS — G25.81 RESTLESS LEG SYNDROME: ICD-10-CM

## 2021-07-15 RX ORDER — ROPINIROLE 1 MG/1
TABLET, FILM COATED ORAL
Qty: 90 TABLET | Refills: 0 | Status: SHIPPED | OUTPATIENT
Start: 2021-07-15 | End: 2021-09-13

## 2021-07-20 ENCOUNTER — OFFICE VISIT (OUTPATIENT)
Dept: FAMILY MEDICINE CLINIC | Age: 81
End: 2021-07-20
Payer: MEDICARE

## 2021-07-20 VITALS
BODY MASS INDEX: 40.92 KG/M2 | OXYGEN SATURATION: 95 % | HEIGHT: 59 IN | DIASTOLIC BLOOD PRESSURE: 68 MMHG | WEIGHT: 203 LBS | TEMPERATURE: 97.3 F | SYSTOLIC BLOOD PRESSURE: 136 MMHG | HEART RATE: 64 BPM

## 2021-07-20 DIAGNOSIS — F41.1 GENERALIZED ANXIETY DISORDER: ICD-10-CM

## 2021-07-20 DIAGNOSIS — I27.20 PULMONARY HYPERTENSION, MODERATE TO SEVERE (HCC): ICD-10-CM

## 2021-07-20 DIAGNOSIS — F33.1 MODERATE EPISODE OF RECURRENT MAJOR DEPRESSIVE DISORDER (HCC): ICD-10-CM

## 2021-07-20 DIAGNOSIS — R60.0 EDEMA OF LEFT LOWER EXTREMITY: ICD-10-CM

## 2021-07-20 DIAGNOSIS — E78.5 DYSLIPIDEMIA: ICD-10-CM

## 2021-07-20 DIAGNOSIS — G25.81 RESTLESS LEG SYNDROME: ICD-10-CM

## 2021-07-20 DIAGNOSIS — J44.9 ASTHMA WITH COPD (CHRONIC OBSTRUCTIVE PULMONARY DISEASE) (HCC): ICD-10-CM

## 2021-07-20 DIAGNOSIS — I10 ESSENTIAL HYPERTENSION: Primary | ICD-10-CM

## 2021-07-20 DIAGNOSIS — H61.23 BILATERAL IMPACTED CERUMEN: ICD-10-CM

## 2021-07-20 DIAGNOSIS — Z91.81 AT HIGH RISK FOR FALLS: ICD-10-CM

## 2021-07-20 DIAGNOSIS — I50.9 CHRONIC CONGESTIVE HEART FAILURE, UNSPECIFIED HEART FAILURE TYPE (HCC): ICD-10-CM

## 2021-07-20 DIAGNOSIS — R73.09 ELEVATED GLUCOSE: ICD-10-CM

## 2021-07-20 DIAGNOSIS — R06.01 ORTHOPNEA: ICD-10-CM

## 2021-07-20 PROCEDURE — G8399 PT W/DXA RESULTS DOCUMENT: HCPCS | Performed by: NURSE PRACTITIONER

## 2021-07-20 PROCEDURE — 3023F SPIROM DOC REV: CPT | Performed by: NURSE PRACTITIONER

## 2021-07-20 PROCEDURE — 1036F TOBACCO NON-USER: CPT | Performed by: NURSE PRACTITIONER

## 2021-07-20 PROCEDURE — 1123F ACP DISCUSS/DSCN MKR DOCD: CPT | Performed by: NURSE PRACTITIONER

## 2021-07-20 PROCEDURE — 1090F PRES/ABSN URINE INCON ASSESS: CPT | Performed by: NURSE PRACTITIONER

## 2021-07-20 PROCEDURE — G8417 CALC BMI ABV UP PARAM F/U: HCPCS | Performed by: NURSE PRACTITIONER

## 2021-07-20 PROCEDURE — 4040F PNEUMOC VAC/ADMIN/RCVD: CPT | Performed by: NURSE PRACTITIONER

## 2021-07-20 PROCEDURE — G8427 DOCREV CUR MEDS BY ELIG CLIN: HCPCS | Performed by: NURSE PRACTITIONER

## 2021-07-20 PROCEDURE — G8926 SPIRO NO PERF OR DOC: HCPCS | Performed by: NURSE PRACTITIONER

## 2021-07-20 PROCEDURE — 99214 OFFICE O/P EST MOD 30 MIN: CPT | Performed by: NURSE PRACTITIONER

## 2021-07-20 ASSESSMENT — PATIENT HEALTH QUESTIONNAIRE - PHQ9
1. LITTLE INTEREST OR PLEASURE IN DOING THINGS: 0
SUM OF ALL RESPONSES TO PHQ QUESTIONS 1-9: 0
2. FEELING DOWN, DEPRESSED OR HOPELESS: 0
SUM OF ALL RESPONSES TO PHQ QUESTIONS 1-9: 0
SUM OF ALL RESPONSES TO PHQ QUESTIONS 1-9: 0
SUM OF ALL RESPONSES TO PHQ9 QUESTIONS 1 & 2: 0

## 2021-07-20 NOTE — PROGRESS NOTES
Dyslipidemia and Hypertension: Calli Finnegan presents for evaluation of lipids. Compliance with treatment thus far has been good. The patient exercises intermittently. Patient here for follow-up of elevated blood pressure. She is not exercising and is adherent to low salt diet. Blood pressure is well controlled at home Antihypertensive medication side effects: no medication side effects noted. Use of agents associated with hypertension: none. No new myalgias or GI upset on atorvastatin (Lipitor). COPD/Asthma/pulmonary HTN/lower extremity edema: She continues to wear oxygen throughout the day and states that her breathing has been at baseline. No significant chest tightness or wheezing reported. Does continue to get tired with most activity. She is aware of history of congestive heart failure but has not seen cardiology since before the pandemic. She is also followed with pulmonology but has not seen specialist in quite a while. Has had virtual visits last year. States that she continues to have more edema in the left lower extremity than the right. Does have difficulty breathing with laying down. Continues to take medication as prescribed but states that she has a lot of tongue tenderness with inhalers. Tolerates nebulizer better. No significant chest congestion or sputum production reported. Depression/anxiety:  Calli Finnegan reports being in a good mood that is stable. The patient is not reporting insomnia, difficulty concentrating and usual interest in activities. This patient is not homicidal or suicidal.    Insomia/RLS: She states that she continues to have issues falling asleep at night but her daughter who is present today states that her mother has always stayed up and read books during the night and slept during the day. The patient states that she has had good improvement of restless leg symptoms with Requip. No side effects reported. Hearing loss:  The patient does have a history of Metabolic Panel    Lipid Panel   3. Moderate episode of recurrent major depressive disorder ([facility] [address]     4. Generalized anxiety disorder     5. Asthma with COPD (chronic obstructive pulmonary disease) ([facility] [address]     6. Pulmonary hypertension, moderate to severe [facility])  Sherin Chan MD, Pulmonology, [place]   7. Restless leg syndrome     8. Elevated glucose  Hemoglobin A1C   9. Bilateral impacted cerumen     10. Edema of left lower extremity     11. At high risk for falls     12. Sherin Chan MD, Invasive Cardiology, [place]   13. Chronic congestive heart failure, unspecified heart failure type [facility])  Nohemi Sullivan MD, Invasive Cardiology, [place]       Plan:  Continue current medicines and dosages. Continue diet and exercise programs, improving where possible. Follow up in 3 months with lab work one week prior. For further details see orders placed. 1.  Blood pressure is well controlled on current medication. No side effects reported. Continue the same. 2.  Lipid panel is stable on statin. No side effects reported. Continue the same. 3.  4.  Mood is overall been stable on Zoloft. Discussed option of increasing dose but patient states that she is doing well overall. 5.  6.  10.   12.  13.  Discussed recommendation for referral to both cardiology and pulmonology. She has followed with the specialist in the past but not since the pandemic. Continue current medications and continuous oxygen. No signs or symptoms of acute fluid overload or exacerbation of COPD. 7.  Symptoms have been well controlled with Requip. No side effects reported. Continue the same. 8.  Sugar levels have been well managed including hemoglobin A1c. We will continue to monitor. 9.  No evidence of infection after ear irrigation. Patient tolerated procedure well. 11.  Discussed home safety tips. See note below.     The patient's daughter does plan to discuss with pulmonology and

## 2021-08-12 DIAGNOSIS — M62.838 MUSCLE SPASM: ICD-10-CM

## 2021-08-13 RX ORDER — OLMESARTAN MEDOXOMIL 40 MG/1
TABLET ORAL
Qty: 60 TABLET | Refills: 0 | Status: SHIPPED | OUTPATIENT
Start: 2021-08-13 | End: 2021-10-11

## 2021-08-13 NOTE — TELEPHONE ENCOUNTER
Requesting medication refill.  Please approve or deny this request.    Rx requested:  Requested Prescriptions     Pending Prescriptions Disp Refills    baclofen (LIORESAL) 20 MG tablet [Pharmacy Med Name: Baclofen 20 MG Oral Tablet] 60 tablet 0     Sig: Take 1 tablet by mouth twice daily       Last Office Visit:   4/24/2019    Last Filled:      Last Labs:      Next Visit Date:  Future Appointments   Date Time Provider Dulce Nannette   8/24/2021  1:15 PM Megan Ca MD [address]   10/18/2021  2:40 PM Joann Church APRN - CNP [facility]

## 2021-08-13 NOTE — TELEPHONE ENCOUNTER
Requesting medication refill.  Please approve or deny this request.    Rx requested:  Requested Prescriptions     Pending Prescriptions Disp Refills    olmesartan (BENICAR) 40 MG tablet [Pharmacy Med Name: Olmesartan Medoxomil 40 MG Oral Tablet] 60 tablet 0     Sig: Take 1 tablet by mouth once daily       Last Office Visit:   2021    Last Filled:      Last Labs:      Next Visit Date:  Future Appointments   Date Time Provider hospitals   2021  1:15 PM Freedom Ha MD [address]   10/18/2021  2:40 PM Jorge Lakhani APRN - CNP [facility] [address]

## 2021-08-16 RX ORDER — BACLOFEN 20 MG/1
TABLET ORAL
Qty: 60 TABLET | Refills: 0 | Status: SHIPPED | OUTPATIENT
Start: 2021-08-16 | End: 2021-09-15

## 2021-09-13 DIAGNOSIS — G25.81 RESTLESS LEG SYNDROME: ICD-10-CM

## 2021-09-13 RX ORDER — ROPINIROLE 1 MG/1
TABLET, FILM COATED ORAL
Qty: 90 TABLET | Refills: 0 | Status: SHIPPED | OUTPATIENT
Start: 2021-09-13 | End: 2021-11-01

## 2021-09-13 NOTE — TELEPHONE ENCOUNTER
Pharmacy is requesting medication refill.  Please approve or deny this request.    Rx requested:  Requested Prescriptions     Pending Prescriptions Disp Refills    rOPINIRole (REQUIP) 1 MG tablet [Pharmacy Med Name: rOPINIRole HCl 1 MG Oral Tablet] 90 tablet 0     Sig: TAKE 1 TABLET BY MOUTH THREE TIMES DAILY         Last Office Visit:   2021      Next Visit Date:  Future Appointments   Date Time Provider Dulce Brunson   10/6/2021  1:30 PM Yue Hurst MD [facility]   10/18/2021  2:40 PM Jorge Lakhani APRN - CNP [place] [address]

## 2021-09-15 DIAGNOSIS — F41.1 GENERALIZED ANXIETY DISORDER: ICD-10-CM

## 2021-09-15 DIAGNOSIS — M62.838 MUSCLE SPASM: ICD-10-CM

## 2021-09-15 RX ORDER — BACLOFEN 20 MG/1
TABLET ORAL
Qty: 60 TABLET | Refills: 0 | Status: SHIPPED | OUTPATIENT
Start: 2021-09-15 | End: 2021-10-19

## 2021-09-15 NOTE — TELEPHONE ENCOUNTER
Requesting medication refill.  Please approve or deny this request.    Rx requested:  Requested Prescriptions     Pending Prescriptions Disp Refills    baclofen (LIORESAL) 20 MG tablet [Pharmacy Med Name: Baclofen 20 MG Oral Tablet] 60 tablet 0     Sig: Take 1 tablet by mouth twice daily    sertraline (ZOLOFT) 50 MG tablet [Pharmacy Med Name: Sertraline HCl 50 MG Oral Tablet] 90 tablet 0     Sig: Take 1 tablet by mouth once daily       Last Office Visit:   2021    Last Filled:      Last Labs:      Next Visit Date:  Future Appointments   Date Time Provider Dulce Brunson   10/6/2021  1:30 PM Yue Hurst MD [address]   10/18/2021  2:40 PM [facility]

## 2021-09-21 DIAGNOSIS — E78.5 DYSLIPIDEMIA: ICD-10-CM

## 2021-09-21 NOTE — TELEPHONE ENCOUNTER
Pharmacy is requesting medication refill.  Please approve or deny this request.    Rx requested:  Requested Prescriptions     Pending Prescriptions Disp Refills    atorvastatin (LIPITOR) 40 MG tablet [Pharmacy Med Name: Atorvastatin Calcium 40 MG Oral Tablet] 90 tablet 0     Sig: Take 1 tablet by mouth daily         Last Office Visit:   7/20/2021      Next Visit Date:  Future Appointments   Date Time Provider Dulce Brunson   10/18/2021  2:40 PM Valeta Route, APRN - CNP [address]

## 2021-09-22 RX ORDER — ATORVASTATIN CALCIUM 40 MG/1
40 TABLET, FILM COATED ORAL DAILY
Qty: 90 TABLET | Refills: 0 | Status: SHIPPED | OUTPATIENT
Start: 2021-09-22 | End: 2021-12-22

## 2021-10-11 RX ORDER — OLMESARTAN MEDOXOMIL 40 MG/1
TABLET ORAL
Qty: 60 TABLET | Refills: 0 | Status: SHIPPED | OUTPATIENT
Start: 2021-10-11 | End: 2021-12-16

## 2021-10-19 DIAGNOSIS — M62.838 MUSCLE SPASM: ICD-10-CM

## 2021-10-19 RX ORDER — BACLOFEN 20 MG/1
TABLET ORAL
Qty: 60 TABLET | Refills: 0 | Status: SHIPPED | OUTPATIENT
Start: 2021-10-19 | End: 2021-11-23

## 2021-10-19 NOTE — TELEPHONE ENCOUNTER
pharmacy requesting medication refill. Please approve or deny this request.    Rx requested:  Requested Prescriptions     Pending Prescriptions Disp Refills    baclofen (LIORESAL) 20 MG tablet [Pharmacy Med Name: Baclofen 20 MG Oral Tablet] 60 tablet 0     Sig: Take 1 tablet by mouth twice daily         Last Office Visit:   7/20/2021      Next Visit Date:  No future appointments.

## 2021-10-29 DIAGNOSIS — G25.81 RESTLESS LEG SYNDROME: ICD-10-CM

## 2021-11-01 RX ORDER — ROPINIROLE 1 MG/1
TABLET, FILM COATED ORAL
Qty: 90 TABLET | Refills: 0 | Status: SHIPPED | OUTPATIENT
Start: 2021-11-01 | End: 2022-01-06

## 2021-11-01 NOTE — TELEPHONE ENCOUNTER
Requesting medication refill. Please approve or deny this request.    Rx requested:  Requested Prescriptions     Pending Prescriptions Disp Refills    rOPINIRole (REQUIP) 1 MG tablet [Pharmacy Med Name: rOPINIRole HCl 1 MG Oral Tablet] 90 tablet 0     Sig: TAKE 1 TABLET BY MOUTH THREE TIMES DAILY       Last Office Visit:   7/20/2021    Last Filled:      Last Labs:      Next Visit Date:  No future appointments.

## 2021-11-04 ENCOUNTER — TELEPHONE (OUTPATIENT)
Dept: FAMILY MEDICINE CLINIC | Age: 81
End: 2021-11-04

## 2021-11-23 DIAGNOSIS — M62.838 MUSCLE SPASM: ICD-10-CM

## 2021-11-23 RX ORDER — BACLOFEN 20 MG/1
TABLET ORAL
Qty: 60 TABLET | Refills: 0 | Status: SHIPPED | OUTPATIENT
Start: 2021-11-23 | End: 2021-12-22

## 2021-11-23 NOTE — TELEPHONE ENCOUNTER
Requesting medication refill. Please approve or deny this request.    Rx requested:  Requested Prescriptions     Pending Prescriptions Disp Refills    baclofen (LIORESAL) 20 MG tablet [Pharmacy Med Name: BACLOFEN 20MG       TAB] 60 tablet 0     Sig: Take 1 tablet by mouth twice daily       Last Office Visit:   7/20/2021    Last Filled:      Last Labs:      Next Visit Date:  No future appointments.

## 2021-12-15 NOTE — TELEPHONE ENCOUNTER
Requesting medication refill. Please approve or deny this request.    Rx requested:  Requested Prescriptions     Pending Prescriptions Disp Refills    olmesartan (BENICAR) 40 MG tablet [Pharmacy Med Name: Olmesartan Medoxomil 40 MG Oral Tablet] 60 tablet 0     Sig: Take 1 tablet by mouth once daily       Last Office Visit:   4/24/2019    Last Filled:      Last Labs:      Next Visit Date:  No future appointments.

## 2021-12-16 DIAGNOSIS — F41.1 GENERALIZED ANXIETY DISORDER: ICD-10-CM

## 2021-12-16 RX ORDER — OLMESARTAN MEDOXOMIL 40 MG/1
TABLET ORAL
Qty: 60 TABLET | Refills: 0 | Status: SHIPPED | OUTPATIENT
Start: 2021-12-16 | End: 2021-12-20 | Stop reason: SDUPTHER

## 2021-12-20 RX ORDER — OLMESARTAN MEDOXOMIL 40 MG/1
TABLET ORAL
Qty: 60 TABLET | Refills: 0 | Status: SHIPPED | OUTPATIENT
Start: 2021-12-20 | End: 2022-07-26

## 2021-12-20 NOTE — TELEPHONE ENCOUNTER
pharmacy requesting medication refill. Please approve or deny this request.    Rx requested:  Requested Prescriptions     Pending Prescriptions Disp Refills    sertraline (ZOLOFT) 50 MG tablet [Pharmacy Med Name: Sertraline HCl 50 MG Oral Tablet] 90 tablet 0     Sig: Take 1 tablet by mouth once daily    olmesartan (BENICAR) 40 MG tablet 60 tablet 0     Sig: Take 1 tablet by mouth once daily         Last Office Visit:   7/20/2021      Next Visit Date:  No future appointments.

## 2021-12-22 DIAGNOSIS — E78.5 DYSLIPIDEMIA: ICD-10-CM

## 2021-12-22 DIAGNOSIS — M62.838 MUSCLE SPASM: ICD-10-CM

## 2021-12-22 RX ORDER — ATORVASTATIN CALCIUM 40 MG/1
TABLET, FILM COATED ORAL
Qty: 90 TABLET | Refills: 0 | Status: SHIPPED | OUTPATIENT
Start: 2021-12-22 | End: 2022-03-29

## 2021-12-22 RX ORDER — BACLOFEN 20 MG/1
TABLET ORAL
Qty: 60 TABLET | Refills: 0 | Status: SHIPPED | OUTPATIENT
Start: 2021-12-22 | End: 2022-01-25

## 2021-12-22 NOTE — TELEPHONE ENCOUNTER
Pharmacy  requesting medication refill. Please approve or deny this request.    Rx requested:  Requested Prescriptions     Pending Prescriptions Disp Refills    atorvastatin (LIPITOR) 40 MG tablet [Pharmacy Med Name: Atorvastatin Calcium 40 MG Oral Tablet] 90 tablet 0     Sig: Take 1 tablet by mouth once daily    baclofen (LIORESAL) 20 MG tablet [Pharmacy Med Name: BACLOFEN 20MG       TAB] 60 tablet 0     Sig: Take 1 tablet by mouth twice daily         Last Office Visit:   7/20/2021      Next Visit Date:  No future appointments.

## 2022-01-06 DIAGNOSIS — G25.81 RESTLESS LEG SYNDROME: ICD-10-CM

## 2022-01-06 RX ORDER — HYDROCHLOROTHIAZIDE 25 MG/1
25 TABLET ORAL DAILY
Qty: 30 TABLET | Refills: 0 | Status: SHIPPED | OUTPATIENT
Start: 2022-01-06 | End: 2022-02-07

## 2022-01-06 RX ORDER — ROPINIROLE 1 MG/1
1 TABLET, FILM COATED ORAL 3 TIMES DAILY
Qty: 90 TABLET | Refills: 0 | Status: SHIPPED | OUTPATIENT
Start: 2022-01-06 | End: 2022-03-15

## 2022-01-06 NOTE — TELEPHONE ENCOUNTER
Comments:     Last Office Visit (last PCP visit):   7/20/2021    Next Visit Date:  No future appointments. **If hasn't been seen in over a year OR hasn't followed up according to last diabetes/ADHD visit, make appointment for patient before sending refill to provider.     Rx requested:  Requested Prescriptions     Pending Prescriptions Disp Refills    rOPINIRole (REQUIP) 1 MG tablet [Pharmacy Med Name: rOPINIRole HCl 1 MG Oral Tablet] 90 tablet 0     Sig: TAKE 1 TABLET BY MOUTH THREE TIMES DAILY    hydroCHLOROthiazide (HYDRODIURIL) 25 MG tablet [Pharmacy Med Name: hydroCHLOROthiazide 25 MG Oral Tablet] 60 tablet 0     Sig: Take 1 tablet by mouth once daily

## 2022-01-25 DIAGNOSIS — M62.838 MUSCLE SPASM: ICD-10-CM

## 2022-01-25 RX ORDER — BACLOFEN 20 MG/1
20 TABLET ORAL 2 TIMES DAILY
Qty: 60 TABLET | Refills: 0 | Status: SHIPPED | OUTPATIENT
Start: 2022-01-25 | End: 2022-02-23

## 2022-02-07 RX ORDER — HYDROCHLOROTHIAZIDE 25 MG/1
TABLET ORAL
Qty: 30 TABLET | Refills: 0 | Status: SHIPPED | OUTPATIENT
Start: 2022-02-07 | End: 2022-03-07

## 2022-02-07 NOTE — TELEPHONE ENCOUNTER
Comments:     Last Office Visit (last PCP visit):   Visit date not found    Next Visit Date:  No future appointments. **If hasn't been seen in over a year OR hasn't followed up according to last diabetes/ADHD visit, make appointment for patient before sending refill to provider.     Rx requested:  Requested Prescriptions     Pending Prescriptions Disp Refills    hydroCHLOROthiazide (HYDRODIURIL) 25 MG tablet [Pharmacy Med Name: hydroCHLOROthiazide 25 MG Oral Tablet] 30 tablet 0     Sig: Take 1 tablet by mouth once daily

## 2022-02-14 RX ORDER — OLMESARTAN MEDOXOMIL AND HYDROCHLOROTHIAZIDE 40/25 40; 25 MG/1; MG/1
1 TABLET ORAL DAILY
Qty: 60 TABLET | Refills: 0 | Status: SHIPPED | OUTPATIENT
Start: 2022-02-14 | End: 2022-04-20

## 2022-02-23 DIAGNOSIS — M62.838 MUSCLE SPASM: ICD-10-CM

## 2022-02-23 RX ORDER — BACLOFEN 20 MG/1
20 TABLET ORAL 2 TIMES DAILY
Qty: 60 TABLET | Refills: 0 | Status: SHIPPED | OUTPATIENT
Start: 2022-02-23 | End: 2022-03-29

## 2022-02-23 NOTE — TELEPHONE ENCOUNTER
Pharmacy requesting medication refill.  Please approve or deny this request.    Rx requested:  Requested Prescriptions     Pending Prescriptions Disp Refills    baclofen (LIORESAL) 20 MG tablet [Pharmacy Med Name: Baclofen 20 MG Oral Tablet] 60 tablet 0     Sig: Take 1 tablet by mouth 2 times daily         Last Office Visit:   7/20/2021      Next Visit Date:  Future Appointments   Date Time Provider Dulce Brunson   4/18/2022  3:20 PM Yoan Morales APRN - CNP [facility] [address]

## 2022-02-28 ENCOUNTER — TELEMEDICINE (OUTPATIENT)
Dept: FAMILY MEDICINE CLINIC | Age: 82
End: 2022-02-28
Payer: MEDICARE

## 2022-02-28 DIAGNOSIS — Z00.00 INITIAL MEDICARE ANNUAL WELLNESS VISIT: Primary | ICD-10-CM

## 2022-02-28 PROCEDURE — G8484 FLU IMMUNIZE NO ADMIN: HCPCS

## 2022-02-28 PROCEDURE — 1123F ACP DISCUSS/DSCN MKR DOCD: CPT

## 2022-02-28 PROCEDURE — G0438 PPPS, INITIAL VISIT: HCPCS

## 2022-02-28 PROCEDURE — 4040F PNEUMOC VAC/ADMIN/RCVD: CPT

## 2022-02-28 ASSESSMENT — PATIENT HEALTH QUESTIONNAIRE - PHQ9
SUM OF ALL RESPONSES TO PHQ QUESTIONS 1-9: 1
2. FEELING DOWN, DEPRESSED OR HOPELESS: 0
SUM OF ALL RESPONSES TO PHQ QUESTIONS 1-9: 1
1. LITTLE INTEREST OR PLEASURE IN DOING THINGS: 1
SUM OF ALL RESPONSES TO PHQ QUESTIONS 1-9: 1
SUM OF ALL RESPONSES TO PHQ QUESTIONS 1-9: 1
SUM OF ALL RESPONSES TO PHQ9 QUESTIONS 1 & 2: 1

## 2022-02-28 ASSESSMENT — LIFESTYLE VARIABLES
HOW OFTEN DO YOU HAVE A DRINK CONTAINING ALCOHOL: MONTHLY OR LESS
HOW MANY STANDARD DRINKS CONTAINING ALCOHOL DO YOU HAVE ON A TYPICAL DAY: 1 OR 2

## 2022-02-28 NOTE — PATIENT INSTRUCTIONS
Personalized Preventive Plan for Siddharth Johnson - 2/28/2022  Medicare offers a range of preventive health benefits. Some of the tests and screenings are paid in full while other may be subject to a deductible, co-insurance, and/or copay. Some of these benefits include a comprehensive review of your medical history including lifestyle, illnesses that may run in your family, and various assessments and screenings as appropriate. After reviewing your medical record and screening and assessments performed today your provider may have ordered immunizations, labs, imaging, and/or referrals for you. A list of these orders (if applicable) as well as your Preventive Care list are included within your After Visit Summary for your review. Other Preventive Recommendations:    · A preventive eye exam performed by an eye specialist is recommended every 1-2 years to screen for glaucoma; cataracts, macular degeneration, and other eye disorders. · A preventive dental visit is recommended every 6 months. · Try to get at least 150 minutes of exercise per week or 10,000 steps per day on a pedometer . · Order or download the FREE \"Exercise & Physical Activity: Your Everyday Guide\" from [employer]. Call 5-997.474.2159 or search [employer] online. · You need 7416-1542 mg of calcium and 7824-4351 IU of vitamin D per day. It is possible to meet your calcium requirement with diet alone, but a vitamin D supplement is usually necessary to meet this goal.  · When exposed to the sun, use a sunscreen that protects against both UVA and UVB radiation with an SPF of 30 or greater. Reapply every 2 to 3 hours or after sweating, drying off with a towel, or swimming. · Always wear a seat belt when traveling in a car. Always wear a helmet when riding a bicycle or motorcycle.

## 2022-02-28 NOTE — PROGRESS NOTES
Medicare Annual Wellness Visit    Claudeen Ferry is here for Medicare Ul. Serge King was seen today for medicare awv. Diagnoses and all orders for this visit:    Initial Medicare annual wellness visit         Recommendations for Preventive Services Due: see orders and patient instructions/AVS.  Recommended screening schedule for the next 5-10 years is provided to the patient in written form: see Patient Instructions/AVS.     Return for Medicare Annual Wellness Visit in 1 year. Subjective   The following acute and/or chronic problems were also addressed today:  na    Patient's complete Health Risk Assessment and screening values have been reviewed and are found in Flowsheets. The following problems were reviewed today and where indicated follow up appointments were made and/or referrals ordered. Positive Risk Factor Screenings with Interventions:    Fall Risk:  Do you feel unsteady or are you worried about falling? : no  2 or more falls in past year?: no  Fall with injury in past year?: (!) yes     Fall Risk Interventions:    · Home safety tips provided              General Health and ACP:  General  In general, how would you say your health is?: Good  In the past 7 days, have you experienced any of the following: New or Increased Pain, New or Increased Fatigue, Loneliness, Social Isolation, Stress or Anger?: (!) Yes  Select all that apply: (!) Loneliness  Do you get the social and emotional support that you need?: Yes  Do you have a Living Will?: Yes    Advance Directives     Power of  Living Will ACP-Advance Directive ACP-Power of     Not on File Not on File Not on File Not on File      General Health Risk Interventions:  · Loneliness: Patient reports feelings of loneliness and unable to get out due to weather.   Advised if the symptoms continue she should follow-up with PCP    Health Habits/Nutrition:     Physical Activity: Inactive    Days of Exercise per Week: 0 days  Minutes of Exercise per Session: 0 min     Have you lost any weight without trying in the past 3 months?: No     Have you seen the dentist within the past year?: Yes    Health Habits/Nutrition Interventions:  · Inadequate physical activity:  patient agrees to increase physical activity as follows: Walking for 20 to 30 minutes 3 times a week. ADLs:  In the past 7 days, did you need help from others to perform any of the following everyday activities: Eating, dressing, grooming, bathing, toileting, or walking/balance?: (!) Yes  Select all that apply: (!) Bathing  In the past 7 days, did you need help from others to take care of any of the following: Laundry, housekeeping, banking/finances, shopping, telephone use, food preparation, transportation, or taking medications?: No    ADL Interventions:  · Patient's daughter helps her with getting in and out of the shower. Daughter lives with her. Objective      Patient-Reported Vitals  No data recorded            Allergies   Allergen Reactions    Seasonal      Grass, trees, dust, pollen. YEAR ROUND     Prior to Visit Medications    Medication Sig Taking?  Authorizing Provider   baclofen (LIORESAL) 20 MG tablet Take 1 tablet by mouth 2 times daily  JUAN Wisdom CNP   olmesartan-hydroCHLOROthiazide (BENICAR HCT) 40-25 MG per tablet Take 1 tablet by mouth daily  JUAN Escoto CNP   hydroCHLOROthiazide (HYDRODIURIL) 25 MG tablet Take 1 tablet by mouth once daily  JUAN Escoto CNP   rOPINIRole (REQUIP) 1 MG tablet Take 1 tablet by mouth 3 times daily  Padmini Ramos MD   atorvastatin (LIPITOR) 40 MG tablet Take 1 tablet by mouth once daily  JUAN Escoto CNP   sertraline (ZOLOFT) 50 MG tablet Take 1 tablet by mouth once daily  JUAN Escoto CNP   olmesartan (BENICAR) 40 MG tablet Take 1 tablet by mouth once daily  JUAN Escoto CNP   fexofenadine (ALLEGRA) 180 MG tablet Take 1 tablet by mouth daily  JUAN Chapin CNP   fluticasone (FLONASE) 50 MCG/ACT nasal spray 1 spray by Nasal route daily  JUAN Chapin CNP   ipratropium-albuterol (DUONEB) 0.5-2.5 (3) MG/3ML SOLN nebulizer solution Inhale 3 mLs into the lungs  Historical Provider, MD   fluticasone-salmeterol (ADVAIR) 250-50 MCG/DOSE AEPB Inhale 1 puff into the lungs every 12 hours  JUAN Wisdom CNP   Blood Pressure Monitoring (SPHYGMOMANOMETER) MISC 1 Device by Does not apply route daily  JUAN Chapin CNP   albuterol sulfate HFA (VENTOLIN HFA) 108 (90 Base) MCG/ACT inhaler Inhale 2 puffs into the lungs every 6 hours as needed for Wheezing  Jesse Major MD   polyethyl glycol-propyl glycol 0.4-0.3 % (SYSTANE) 0.4-0.3 % ophthalmic solution 1 drop as needed for Dry Eyes  Historical Provider, MD [place] (Including outside providers/suppliers regularly involved in providing care):   Patient Care Team:  JUAN Vaughn CNP as PCP - General (Certified Nurse Practitioner)  JUAN Vaughn CNP as PCP - [address] [place] [address] Dr [place] Provider  Mario Urias MD as Surgeon (Otolaryngology)    Reviewed and updated this visit:  Allergies            Melodie Francis, was evaluated through a synchronous (real-time) audio-video encounter. The patient (or guardian if applicable) is aware that this is a billable service, which includes applicable co-pays. This Virtual Visit was conducted with patient's (and/or legal guardian's) consent. The visit was conducted pursuant to the emergency declaration under the [facility], [address] [place] [address] authority and the [employer] and [employer] General Act. Patient identification was verified, and a caregiver was present when appropriate. The patient was located at home in a state where the provider was licensed to provide care.

## 2022-03-07 RX ORDER — HYDROCHLOROTHIAZIDE 25 MG/1
TABLET ORAL
Qty: 30 TABLET | Refills: 0 | Status: SHIPPED | OUTPATIENT
Start: 2022-03-07 | End: 2022-04-08

## 2022-03-07 NOTE — TELEPHONE ENCOUNTER
pharmacy requesting medication refill.  Please approve or deny this request.    Rx requested:  Requested Prescriptions     Pending Prescriptions Disp Refills    hydroCHLOROthiazide (HYDRODIURIL) 25 MG tablet [Pharmacy Med Name: hydroCHLOROthiazide 25 MG Oral Tablet] 30 tablet 0     Sig: Take 1 tablet by mouth once daily         Last Office Visit:   7/20/2021      Next Visit Date:  Future Appointments   Date Time Provider Dulce Brunson   4/18/2022  3:20 PM Reinier Navarro, [facility]N - [facility]

## 2022-03-14 DIAGNOSIS — G25.81 RESTLESS LEG SYNDROME: ICD-10-CM

## 2022-03-15 RX ORDER — ROPINIROLE 1 MG/1
TABLET, FILM COATED ORAL
Qty: 90 TABLET | Refills: 0 | Status: SHIPPED | OUTPATIENT
Start: 2022-03-15 | End: 2022-03-16

## 2022-03-15 NOTE — TELEPHONE ENCOUNTER
pharmacy requesting medication refill.  Please approve or deny this request.    Rx requested:  Requested Prescriptions     Pending Prescriptions Disp Refills    rOPINIRole (REQUIP) 1 MG tablet [Pharmacy Med Name: rOPINIRole HCl 1 MG Oral Tablet] 90 tablet 0     Sig: TAKE 1 TABLET BY [address] Office Visit:   Visit date not found      Next Visit Date:  Future Appointments   Date Time Provider Dulce Brunson   4/18/2022  3:20 PM Eleanor Montero, APRN - CNP [facility] [place]

## 2022-03-16 DIAGNOSIS — G25.81 RESTLESS LEG SYNDROME: ICD-10-CM

## 2022-03-16 RX ORDER — ROPINIROLE 1 MG/1
TABLET, FILM COATED ORAL
Qty: 90 TABLET | Refills: 0 | Status: SHIPPED | OUTPATIENT
Start: 2022-03-16 | End: 2022-08-09

## 2022-03-16 NOTE — TELEPHONE ENCOUNTER
Comments:     Last Office Visit (last PCP visit):   Visit date not found    Next Visit Date:  Future Appointments   Date Time Provider Dulce Brunson   4/18/2022  3:20 PM Marie Castellon, APRN - CNP [address]       **If hasn't been seen in over a year OR hasn't followed up according to last diabetes/ADHD visit, make appointment for patient before sending refill to provider.     Rx requested:  Requested Prescriptions     Pending Prescriptions Disp Refills    rOPINIRole (REQUIP) 1 MG tablet [Pharmacy Med Name: rOPINIRole HCl 1 MG Oral Tablet] 90 tablet 0     Sig: TAKE 1 TABLET BY MOUTH THREE TIMES DAILY

## 2022-03-22 DIAGNOSIS — F41.1 GENERALIZED ANXIETY DISORDER: ICD-10-CM

## 2022-03-22 NOTE — TELEPHONE ENCOUNTER
pharmacy requesting medication refill.  Please approve or deny this request.    Rx requested:  Requested Prescriptions     Pending Prescriptions Disp Refills    sertraline (ZOLOFT) 50 MG tablet [Pharmacy Med Name: Sertraline HCl 50 MG Oral Tablet] 90 tablet 0     Sig: Take 1 tablet by mouth once daily         Last Office Visit:   7/20/2021      Next Visit Date:  Future Appointments   Date Time Provider Dulce Brunson   4/18/2022  3:20 PM Elsa Bronson, APRN - CNP [facility] [address]

## 2022-03-29 DIAGNOSIS — M62.838 MUSCLE SPASM: ICD-10-CM

## 2022-03-29 DIAGNOSIS — E78.5 DYSLIPIDEMIA: ICD-10-CM

## 2022-03-29 RX ORDER — ATORVASTATIN CALCIUM 40 MG/1
TABLET, FILM COATED ORAL
Qty: 90 TABLET | Refills: 0 | Status: SHIPPED | OUTPATIENT
Start: 2022-03-29 | End: 2022-06-28

## 2022-03-29 RX ORDER — BACLOFEN 20 MG/1
TABLET ORAL
Qty: 60 TABLET | Refills: 0 | Status: SHIPPED | OUTPATIENT
Start: 2022-03-29 | End: 2022-05-03

## 2022-03-29 NOTE — TELEPHONE ENCOUNTER
pharmacy requesting medication refill.  Please approve or deny this request.    Rx requested:  Requested Prescriptions     Pending Prescriptions Disp Refills    atorvastatin (LIPITOR) 40 MG tablet [Pharmacy Med Name: Atorvastatin Calcium 40 MG Oral Tablet] 90 tablet 0     Sig: Take 1 tablet by mouth once daily         Last Office Visit:   7/20/2021      Next Visit Date:  Future Appointments   Date Time Provider Dulce Brunson   4/18/2022  3:20 PM [facility]AN Green - [facility]

## 2022-03-29 NOTE — TELEPHONE ENCOUNTER
pharmacy requesting medication refill.  Please approve or deny this request.    Rx requested:  Requested Prescriptions     Pending Prescriptions Disp Refills    baclofen (LIORESAL) 20 MG tablet [Pharmacy Med Name: Baclofen 20 MG Oral Tablet] 60 tablet 0     Sig: Take 1 tablet by mouth twice daily         Last Office Visit:   7/20/2021      Next Visit Date:  Future Appointments   Date Time Provider Dulce Brunson   4/18/2022  3:20 PM JUAN Vasquez - CNP \[facility]""[address]

## 2022-04-08 RX ORDER — HYDROCHLOROTHIAZIDE 25 MG/1
25 TABLET ORAL DAILY
Qty: 90 TABLET | Refills: 0 | Status: SHIPPED | OUTPATIENT
Start: 2022-04-08 | End: 2022-05-14

## 2022-04-08 NOTE — TELEPHONE ENCOUNTER
Pharmacy is requesting medication refill.  Please approve or deny this request.    Rx requested:  Requested Prescriptions     Pending Prescriptions Disp Refills    hydroCHLOROthiazide (HYDRODIURIL) 25 MG tablet [Pharmacy Med Name: hydroCHLOROthiazide 25 MG Oral Tablet] 90 tablet 0     Sig: Take 1 tablet by mouth daily         Last Office Visit:   7/20/2021      Next Visit Date:  Future Appointments   Date Time Provider Dulce Brunson   4/18/2022  3:20 PM Juan Streeter, [facility]

## 2022-04-20 RX ORDER — OLMESARTAN MEDOXOMIL AND HYDROCHLOROTHIAZIDE 40/25 40; 25 MG/1; MG/1
TABLET ORAL
Qty: 60 TABLET | Refills: 5 | Status: SHIPPED | OUTPATIENT
Start: 2022-04-20 | End: 2022-07-26

## 2022-04-20 NOTE — TELEPHONE ENCOUNTER
Comments:     Last Office Visit (last PCP visit):   7/20/2021    Next Visit Date:  Future Appointments   Date Time Provider Dulce Brunson   7/26/2022 11:10 AM Roly Castellon, APRN - CNP [address]       **If hasn't been seen in over a year OR hasn't followed up according to last diabetes/ADHD visit, make appointment for patient before sending refill to provider.     Rx requested:  Requested Prescriptions     Pending Prescriptions Disp Refills    olmesartan-hydroCHLOROthiazide (BENICAR HCT) 40-25 MG per tablet [Pharmacy Med Name: Olmesartan Medoxomil-HCTZ 40-25 MG Oral Tablet] 60 tablet 0     Sig: Take 1 tablet by mouth once daily

## 2022-05-03 DIAGNOSIS — M62.838 MUSCLE SPASM: ICD-10-CM

## 2022-05-03 RX ORDER — BACLOFEN 20 MG/1
20 TABLET ORAL 2 TIMES DAILY
Qty: 60 TABLET | Refills: 5 | Status: SHIPPED | OUTPATIENT
Start: 2022-05-03

## 2022-05-03 NOTE — TELEPHONE ENCOUNTER
Pharmacy is  requesting medication refill.  Please approve or deny this request.    Rx requested:  Requested Prescriptions     Pending Prescriptions Disp Refills    baclofen (LIORESAL) 20 MG tablet [Pharmacy Med Name: Baclofen 20 MG Oral Tablet] 60 tablet 0     Sig: Take 1 tablet by mouth 2 times daily         Last Office Visit:   7/20/2021      Next Visit Date:  Future Appointments   Date Time Provider Dulce Brunson   7/26/2022 11:10 AM [facility]iu [facility]

## 2022-05-13 NOTE — TELEPHONE ENCOUNTER
requesting medication refill.  Please approve or deny this request.    Rx requested:  Requested Prescriptions     Pending Prescriptions Disp Refills    hydroCHLOROthiazide (HYDRODIURIL) 25 MG tablet [Pharmacy Med Name: hydroCHLOROthiazide 25 MG Oral Tablet] 90 tablet 0     Sig: Take 1 tablet by mouth once daily         Last Office Visit:   7/20/2021      Next Visit Date:  Future Appointments   Date Time Provider Dulce Brunson   7/26/2022 11:10 AM [facility]AN Rose [facility]

## 2022-05-14 RX ORDER — HYDROCHLOROTHIAZIDE 25 MG/1
TABLET ORAL
Qty: 90 TABLET | Refills: 0 | Status: SHIPPED | OUTPATIENT
Start: 2022-05-14 | End: 2022-07-26 | Stop reason: SDUPTHER

## 2022-05-21 NOTE — TELEPHONE ENCOUNTER
Patient called when she cough yesterday he had bloody mucus it has not happen since then but now her sinus are draining really bad that it is burning her throat and nose she was only given predniSONE (DELTASONE) 10 MG tablet. Was seen by Dr Devante Pierce on 2/20/18. cp r/room air

## 2022-06-20 DIAGNOSIS — F41.1 GENERALIZED ANXIETY DISORDER: ICD-10-CM

## 2022-06-20 NOTE — TELEPHONE ENCOUNTER
Pharmacy is requesting medication refill.  Please approve or deny this request.    Rx requested:  Requested Prescriptions     Pending Prescriptions Disp Refills    sertraline (ZOLOFT) 50 MG tablet [Pharmacy Med Name: Sertraline HCl 50 MG Oral Tablet] 90 tablet 0     Sig: Take 1 tablet by mouth daily         Last Office Visit:   7/20/2021      Next Visit Date:  Future Appointments   Date Time Provider Dulce Brunson   7/26/2022 11:10 AM [facility]

## 2022-06-28 DIAGNOSIS — E78.5 DYSLIPIDEMIA: ICD-10-CM

## 2022-06-28 NOTE — TELEPHONE ENCOUNTER
Pharmacy is requesting medication refill.  Please approve or deny this request.    Rx requested:  Requested Prescriptions     Pending Prescriptions Disp Refills    atorvastatin (LIPITOR) 40 MG tablet [Pharmacy Med Name: Atorvastatin Calcium 40 MG Oral Tablet] 90 tablet 0     Sig: Take 1 tablet by mouth daily         Last Office Visit:   7/20/2021      Next Visit Date:  Future Appointments   Date Time Provider Dulce Brunson   7/26/2022 11:10 AM Jose [facility]villa[facility]

## 2022-06-29 RX ORDER — ATORVASTATIN CALCIUM 40 MG/1
40 TABLET, FILM COATED ORAL DAILY
Qty: 90 TABLET | Refills: 0 | Status: SHIPPED | OUTPATIENT
Start: 2022-06-29 | End: 2022-10-04

## 2022-07-26 ENCOUNTER — OFFICE VISIT (OUTPATIENT)
Dept: FAMILY MEDICINE CLINIC | Age: 82
End: 2022-07-26
Payer: MEDICARE

## 2022-07-26 VITALS
RESPIRATION RATE: 20 BRPM | HEIGHT: 59 IN | DIASTOLIC BLOOD PRESSURE: 58 MMHG | WEIGHT: 202.2 LBS | SYSTOLIC BLOOD PRESSURE: 128 MMHG | BODY MASS INDEX: 40.76 KG/M2 | HEART RATE: 59 BPM | OXYGEN SATURATION: 95 % | TEMPERATURE: 96.8 F

## 2022-07-26 DIAGNOSIS — J44.9 ASTHMA WITH COPD (CHRONIC OBSTRUCTIVE PULMONARY DISEASE) (HCC): ICD-10-CM

## 2022-07-26 DIAGNOSIS — F41.1 GENERALIZED ANXIETY DISORDER: ICD-10-CM

## 2022-07-26 DIAGNOSIS — R26.89 BALANCE PROBLEM: ICD-10-CM

## 2022-07-26 DIAGNOSIS — I10 ESSENTIAL HYPERTENSION: Primary | ICD-10-CM

## 2022-07-26 DIAGNOSIS — G25.81 RESTLESS LEG SYNDROME: ICD-10-CM

## 2022-07-26 DIAGNOSIS — I27.20 PULMONARY HYPERTENSION, MODERATE TO SEVERE (HCC): ICD-10-CM

## 2022-07-26 DIAGNOSIS — I20.9 ANGINA PECTORIS, UNSPECIFIED (HCC): ICD-10-CM

## 2022-07-26 DIAGNOSIS — E78.5 DYSLIPIDEMIA: ICD-10-CM

## 2022-07-26 DIAGNOSIS — Z91.81 AT HIGH RISK FOR FALLS: ICD-10-CM

## 2022-07-26 DIAGNOSIS — I50.9 CHRONIC CONGESTIVE HEART FAILURE, UNSPECIFIED HEART FAILURE TYPE (HCC): ICD-10-CM

## 2022-07-26 DIAGNOSIS — R73.09 ELEVATED GLUCOSE: ICD-10-CM

## 2022-07-26 DIAGNOSIS — F33.1 MODERATE EPISODE OF RECURRENT MAJOR DEPRESSIVE DISORDER (HCC): ICD-10-CM

## 2022-07-26 DIAGNOSIS — E66.01 OBESITY, CLASS III, BMI 40-49.9 (MORBID OBESITY) (HCC): ICD-10-CM

## 2022-07-26 PROCEDURE — 99214 OFFICE O/P EST MOD 30 MIN: CPT | Performed by: NURSE PRACTITIONER

## 2022-07-26 PROCEDURE — 1123F ACP DISCUSS/DSCN MKR DOCD: CPT | Performed by: NURSE PRACTITIONER

## 2022-07-26 RX ORDER — HYDROCHLOROTHIAZIDE 25 MG/1
TABLET ORAL
Qty: 90 TABLET | Refills: 1 | Status: SHIPPED | OUTPATIENT
Start: 2022-07-26

## 2022-07-26 RX ORDER — OLMESARTAN MEDOXOMIL 40 MG/1
TABLET ORAL
Qty: 90 TABLET | Refills: 1 | Status: SHIPPED | OUTPATIENT
Start: 2022-07-26

## 2022-07-26 SDOH — ECONOMIC STABILITY: FOOD INSECURITY: WITHIN THE PAST 12 MONTHS, YOU WORRIED THAT YOUR FOOD WOULD RUN OUT BEFORE YOU GOT MONEY TO BUY MORE.: NEVER TRUE

## 2022-07-26 SDOH — ECONOMIC STABILITY: FOOD INSECURITY: WITHIN THE PAST 12 MONTHS, THE FOOD YOU BOUGHT JUST DIDN'T LAST AND YOU DIDN'T HAVE MONEY TO GET MORE.: NEVER TRUE

## 2022-07-26 ASSESSMENT — SOCIAL DETERMINANTS OF HEALTH (SDOH): HOW HARD IS IT FOR YOU TO PAY FOR THE VERY BASICS LIKE FOOD, HOUSING, MEDICAL CARE, AND HEATING?: NOT HARD AT ALL

## 2022-07-26 NOTE — PROGRESS NOTES
Dyslipidemia and Hypertension/Elevated glucose: Digna Crespo presents for evaluation of lipids. Compliance with treatment thus far has been good. The patient exercises intermittently. Patient here for follow-up of elevated blood pressure. She is exercising and is adherent to low salt diet. Blood pressure is well controlled at home Antihypertensive medication side effects: no medication side effects noted. Use of agents associated with hypertension: none. No new myalgias or GI upset on atorvastatin (Lipitor). CHF/COPD/RLS: He states that she is not currently following with any lung specialist or heart specialist.  Faby Samuel that her breathing has been stable. She has not been seen in the office by anyone for the past year. She wears continuous oxygen at 2 l NC while out and about and 2.5 L at home. Reports no issues with sleep quality and does not have any issues with chronic fatigue. She has not had any recent chest tightness or wheezing. No chest congestion or thick sputum production. She was initially started on oxygen during a previous hospitalization. Is taking nebulizer and inhalers routinely and little need for rescue inhaler or nebulizer. She denies any significant swelling to her lower extremities but states that sometimes her left leg swells up a little bit. Resolves with elevation. No difficulty breathing when laying down but endorses sleeping in her chair for comfort purposes. Reports that restless leg symptoms have been well managed with current dose of Requip and she has not had any side effects. Depression/anxiety: Digna Crespo reports being in a good mood that is stable. The patient is not reporting insomnia, difficulty concentrating and usual interest in activities. This patient is not homicidal or suicidal.    Balance problem:   States that she is currently living in a home attached to her child's home but has separate entrance. She states that this has worked out well for her.   She is able to prepare meals and dress herself but has had difficulty with hygiene. She is also noticed that her balance has not been as good as it used to be. Does use objects that are fixed around the home to help with stability. She does not report any near-syncope or syncope events. No injuries with fall. No ear pain or discomfort. No left or right-sided weakness. No visual changes. Lab Results   Component Value Date    ALT 15 06/29/2021    AST 21 06/29/2021     Lab Results   Component Value Date    CHOL 118 06/29/2021    TRIG 131 06/29/2021    HDL 36 (L) 06/29/2021    LDLCALC 56 06/29/2021        PMH: The patient is not known to have coexisting coronary artery disease. ROS: This patient reports no chest pain or pressure. There is no increased shortness of breath or cough. The patient reports no nausea or vomiting. There is no heartburn or indigestion. There is no diarrhea or constipation. No black, bloody, mucusy or tarry stool noticed. The patient reports no bloating and no change in appetite. There is no numbness, tingling or swelling in the extremities. Occasional leg ankle edema. EXAM:  Constitutional Blood pressure (!) 128/58, pulse 59, temperature 96.8 °F (36 °C), temperature source Temporal, resp. rate 20, height 4' 11\" (1.499 m), weight 202 lb 3.2 oz (91.7 kg), SpO2 95 %, not currently breastfeeding. .  She has a normal affect, no acute distress, appears well developed and well nourished. Neck:  neck- supple, no mass, non-tender and no bruits  Lungs:  Normal expansion. Clear to auscultation. No rales, rhonchi, or wheezing., No chest wall tenderness. Heart: Auscultation: Rhythm: normal rate  Murmur(s)-  3/6 systolic at 2nd left intercostal space, at 2nd right intercostal space  Abdomen:  Soft, non-tender, normal bowel sounds. No bruits, organomegaly or masses. Extremities: Extremities warm to touch, pink, with no edema. DIAGNOSIS:    Diagnosis Orders   1.  Essential continues to wear oxygen. Discussed recommendation for current testing to include echocardiogram.  Cardiac murmur is present. No evidence of acute fluid overload or unstable angina. We will readdress recommendation for referral to specialist at next visit. Home health care services requested as ordered. 6.  10. She reports that mood has been stable on current medication with no side effects reported. Continue the same. 9.  Restless leg symptoms have been well managed with current medication. No side effects reported. Continue the same. 12.  13.  Recommend that she change positions slowly. Home health care services ordered in the home and she is requesting to also have home health care aide to help with hygiene. She does not report any safety concerns at this time. Please note this report has been partially produced using speech recognition software and may cause contain errors related to that system including grammar, punctuation and spelling as well as words and phrases that may seem inappropriate. If there are questions or concerns please feel free to contact me to clarify. Electronically signed by JUAN Wise - CNP-CNP, 4:03 PM 7/26/22              On the basis of positive falls risk screening, assessment and plan is as follows: referral to physical therapy provided for strength and balance training.

## 2022-08-05 DIAGNOSIS — G25.81 RESTLESS LEG SYNDROME: ICD-10-CM

## 2022-08-09 RX ORDER — ROPINIROLE 1 MG/1
TABLET, FILM COATED ORAL
Qty: 90 TABLET | Refills: 0 | Status: SHIPPED | OUTPATIENT
Start: 2022-08-09 | End: 2022-11-01

## 2022-08-09 NOTE — TELEPHONE ENCOUNTER
Pharmacy is requesting medication refill.  Please approve or deny this request.    Rx requested:  Requested Prescriptions     Pending Prescriptions Disp Refills    rOPINIRole (REQUIP) 1 MG tablet [Pharmacy Med Name: rOPINIRole HCl 1 MG Oral Tablet] 90 tablet 0     Sig: TAKE 1 TABLET BY MOUTH THREE TIMES DAILY         Last Office Visit:   7/26/2022      Next Visit Date:  Future Appointments   Date Time Provider Dulce Brunson   10/27/2022  2:30 PM Sherwin Rowe, APRN - [facility]

## 2022-09-28 DIAGNOSIS — F41.1 GENERALIZED ANXIETY DISORDER: ICD-10-CM

## 2022-09-28 NOTE — TELEPHONE ENCOUNTER
Patient is requesting medication refill.  Please approve or deny this request.    Rx requested:  Requested Prescriptions     Pending Prescriptions Disp Refills    sertraline (ZOLOFT) 50 MG tablet [Pharmacy Med Name: Sertraline HCl 50 MG Oral Tablet] 90 tablet 0     Sig: Take 1 tablet by mouth once daily         Last Office Visit:   Visit date not found      Next Visit Date:  Future Appointments   Date Time Provider Dulce Brunson   10/27/2022  2:30 PM [address]

## 2022-10-03 DIAGNOSIS — E78.5 DYSLIPIDEMIA: ICD-10-CM

## 2022-10-04 RX ORDER — ATORVASTATIN CALCIUM 40 MG/1
TABLET, FILM COATED ORAL
Qty: 90 TABLET | Refills: 0 | Status: SHIPPED | OUTPATIENT
Start: 2022-10-04

## 2022-10-04 NOTE — TELEPHONE ENCOUNTER
Patient is requesting medication refill.  Please approve or deny this request.    Rx requested:  Requested Prescriptions     Pending Prescriptions Disp Refills    atorvastatin (LIPITOR) 40 MG tablet [Pharmacy Med Name: Atorvastatin Calcium 40 MG Oral Tablet] 90 tablet 0     Sig: Take 1 tablet by mouth once daily         Last Office Visit:   7/26/2022      Next Visit Date:  Future Appointments   Date Time Provider Dulce Brunson   10/27/2022  2:30 PM Eliud Villa, [address]

## 2022-10-25 ENCOUNTER — HOSPITAL ENCOUNTER (OUTPATIENT)
Dept: LAB | Age: 82
Discharge: HOME | End: 2022-10-25
Payer: MEDICARE

## 2022-10-25 DIAGNOSIS — R73.09 ELEVATED GLUCOSE: ICD-10-CM

## 2022-10-25 DIAGNOSIS — E78.5 DYSLIPIDEMIA: ICD-10-CM

## 2022-10-25 LAB
ALBUMIN SERPL-MCNC: 4.2 G/DL (ref 3.5–4.6)
ALP BLD-CCNC: 56 U/L (ref 40–130)
ALT SERPL-CCNC: 15 U/L (ref 0–33)
ANION GAP SERPL CALCULATED.3IONS-SCNC: 14 MEQ/L (ref 9–15)
AST SERPL-CCNC: 17 U/L (ref 0–35)
BASOPHILS ABSOLUTE: 0 K/UL (ref 0–0.2)
BASOPHILS RELATIVE PERCENT: 0.2 %
BILIRUB SERPL-MCNC: 0.4 MG/DL (ref 0.2–0.7)
BUN BLDV-MCNC: 30 MG/DL (ref 8–23)
CALCIUM SERPL-MCNC: 9.2 MG/DL (ref 8.5–9.9)
CHLORIDE BLD-SCNC: 97 MEQ/L (ref 95–107)
CHOLESTEROL, TOTAL: 118 MG/DL (ref 0–199)
CO2: 31 MEQ/L (ref 20–31)
CREAT SERPL-MCNC: 1.04 MG/DL (ref 0.5–0.9)
EOSINOPHILS ABSOLUTE: 0.3 K/UL (ref 0–0.7)
EOSINOPHILS RELATIVE PERCENT: 3.4 %
GFR SERPL CREATININE-BSD FRML MDRD: 54.1 ML/MIN/{1.73_M2}
GLOBULIN: 2.9 G/DL (ref 2.3–3.5)
GLUCOSE BLD-MCNC: 105 MG/DL (ref 70–99)
HBA1C MFR BLD: 5.7 % (ref 4.8–5.9)
HCT VFR BLD CALC: 32.3 % (ref 37–47)
HDLC SERPL-MCNC: 39 MG/DL (ref 40–59)
HEMOGLOBIN: 10.5 G/DL (ref 12–16)
LDL CHOLESTEROL CALCULATED: 51 MG/DL (ref 0–129)
LYMPHOCYTES ABSOLUTE: 2.3 K/UL (ref 1–4.8)
LYMPHOCYTES RELATIVE PERCENT: 26 %
MCH RBC QN AUTO: 30.2 PG (ref 27–31.3)
MCHC RBC AUTO-ENTMCNC: 32.6 % (ref 33–37)
MCV RBC AUTO: 92.6 FL (ref 79.4–94.8)
MONOCYTES ABSOLUTE: 0.6 K/UL (ref 0.2–0.8)
MONOCYTES RELATIVE PERCENT: 7.1 %
NEUTROPHILS ABSOLUTE: 5.5 K/UL (ref 1.4–6.5)
NEUTROPHILS RELATIVE PERCENT: 63.3 %
PDW BLD-RTO: 14.6 % (ref 11.5–14.5)
PLATELET # BLD: 167 K/UL (ref 130–400)
POTASSIUM SERPL-SCNC: 3.1 MEQ/L (ref 3.4–4.9)
RBC # BLD: 3.49 M/UL (ref 4.2–5.4)
SODIUM BLD-SCNC: 142 MEQ/L (ref 135–144)
TOTAL PROTEIN: 7.1 G/DL (ref 6.3–8)
TRIGL SERPL-MCNC: 140 MG/DL (ref 0–150)
WBC # BLD: 8.8 K/UL (ref 4.8–10.8)

## 2022-10-25 PROCEDURE — 83036 HEMOGLOBIN GLYCOSYLATED A1C: CPT

## 2022-10-25 PROCEDURE — 80061 LIPID PANEL: CPT

## 2022-10-25 PROCEDURE — 36415 COLL VENOUS BLD VENIPUNCTURE: CPT

## 2022-10-25 PROCEDURE — 85025 COMPLETE CBC W/AUTO DIFF WBC: CPT

## 2022-10-25 PROCEDURE — 80053 COMPREHEN METABOLIC PANEL: CPT

## 2022-10-27 DIAGNOSIS — G25.81 RESTLESS LEG SYNDROME: ICD-10-CM

## 2022-10-28 NOTE — TELEPHONE ENCOUNTER
Comments:     Last Office Visit (last PCP visit):   7/26/2022    Next Visit Date:  Future Appointments   Date Time Provider Dulce Brunson   11/3/2022  3:30 PM MD Adele Krishnamurthy [address]       **If hasn't been seen in over a year OR hasn't followed up according to last diabetes/ADHD visit, make appointment for patient before sending refill to provider.     Rx requested:  Requested Prescriptions     Pending Prescriptions Disp Refills    rOPINIRole (REQUIP) 1 MG tablet [Pharmacy Med Name: rOPINIRole HCl 1 MG Oral Tablet] 90 tablet 0     Sig: TAKE 1 TABLET BY MOUTH THREE TIMES DAILY

## 2022-10-31 DIAGNOSIS — G25.81 RESTLESS LEG SYNDROME: ICD-10-CM

## 2022-10-31 NOTE — TELEPHONE ENCOUNTER
Pharmacy - Adamaris Henning)  Next Appt - 11/3/2022 (establish care w/ Manoj)   LOV - 7/26/22    Pt is in need of a refill: she has been out of the medication for a few days.

## 2022-11-01 RX ORDER — ROPINIROLE 1 MG/1
TABLET, FILM COATED ORAL
Qty: 90 TABLET | Refills: 0 | Status: SHIPPED | OUTPATIENT
Start: 2022-11-01

## 2022-11-02 DIAGNOSIS — E87.6 HYPOKALEMIA: Primary | ICD-10-CM

## 2022-11-02 RX ORDER — ROPINIROLE 1 MG/1
TABLET, FILM COATED ORAL
Qty: 90 TABLET | Refills: 0 | OUTPATIENT
Start: 2022-11-02

## 2022-11-02 RX ORDER — POTASSIUM CHLORIDE 750 MG/1
10 TABLET, EXTENDED RELEASE ORAL DAILY
Qty: 14 TABLET | Refills: 0 | Status: SHIPPED | OUTPATIENT
Start: 2022-11-02

## 2022-11-02 NOTE — TELEPHONE ENCOUNTER
Last Office Visit (last PCP visit):   7/26/2022    Next Visit Date:  Future Appointments   Date Time Provider Dulce Brunson   11/3/2022  3:30 PM MD Adele Krishnamurthy [address]       **If hasn't been seen in over a year OR hasn't followed up according to last diabetes/ADHD visit, make appointment for patient before sending refill to provider.     Rx requested:  Requested Prescriptions     Pending Prescriptions Disp Refills    rOPINIRole (REQUIP) 1 MG tablet 90 tablet 0

## 2022-11-02 NOTE — RESULT ENCOUNTER NOTE
Please notify Ahmet Hernandez that lab results show low potassium level. A medication for blood pressure/fluid retention can contribute to this. However, her potassium levels had been stable previously. Has there been any recent diarrhea? I am sending a short term supply of potassium supplement to local pharmacy. This level  can be assessed again in the future. Anemia is present again, with lower Hemoglobin/Hematocrit when compared to last year. BUN is elevated which can also indicate an issue with the GI system. Has there  been any abnormal dark  stools or blood in the stools? Cholesterol panel is stable. Hemoglobin A1c is slightly increased at 5.7 which is mildly elevated and in range of Pre-Diabetes. All of this can be discussed at in-office visit further. Will  forward to Physician who she will be establishing this week with as well for ELE

## 2022-11-03 ENCOUNTER — OFFICE VISIT (OUTPATIENT)
Dept: FAMILY MEDICINE CLINIC | Age: 82
End: 2022-11-03
Payer: MEDICARE

## 2022-11-03 VITALS
HEART RATE: 59 BPM | HEIGHT: 59 IN | WEIGHT: 202 LBS | SYSTOLIC BLOOD PRESSURE: 110 MMHG | OXYGEN SATURATION: 92 % | TEMPERATURE: 98.1 F | BODY MASS INDEX: 40.72 KG/M2 | DIASTOLIC BLOOD PRESSURE: 68 MMHG

## 2022-11-03 DIAGNOSIS — F32.9 MAJOR DEPRESSIVE DISORDER, REMISSION STATUS UNSPECIFIED, UNSPECIFIED WHETHER RECURRENT: ICD-10-CM

## 2022-11-03 DIAGNOSIS — E78.5 HYPERLIPIDEMIA, UNSPECIFIED HYPERLIPIDEMIA TYPE: ICD-10-CM

## 2022-11-03 DIAGNOSIS — I10 ESSENTIAL HYPERTENSION: Primary | ICD-10-CM

## 2022-11-03 DIAGNOSIS — N28.9 RENAL INSUFFICIENCY: ICD-10-CM

## 2022-11-03 DIAGNOSIS — E87.6 HYPOKALEMIA: ICD-10-CM

## 2022-11-03 DIAGNOSIS — J44.9 ASTHMA WITH COPD (CHRONIC OBSTRUCTIVE PULMONARY DISEASE) (HCC): Chronic | ICD-10-CM

## 2022-11-03 DIAGNOSIS — F41.1 GENERALIZED ANXIETY DISORDER: ICD-10-CM

## 2022-11-03 DIAGNOSIS — Z23 NEED FOR VACCINATION: ICD-10-CM

## 2022-11-03 DIAGNOSIS — M85.89 OSTEOPENIA OF MULTIPLE SITES: ICD-10-CM

## 2022-11-03 DIAGNOSIS — I27.20 PULMONARY HYPERTENSION, MODERATE TO SEVERE (HCC): ICD-10-CM

## 2022-11-03 DIAGNOSIS — R73.03 PRE-DIABETES: ICD-10-CM

## 2022-11-03 DIAGNOSIS — E66.01 MORBID OBESITY (HCC): ICD-10-CM

## 2022-11-03 PROBLEM — F34.1 DYSTHYMIA: Status: RESOLVED | Noted: 2018-04-27 | Resolved: 2022-11-03

## 2022-11-03 PROBLEM — H04.123 DRY EYE SYNDROME, BILATERAL: Status: ACTIVE | Noted: 2017-09-01

## 2022-11-03 PROBLEM — I20.9 ANGINA PECTORIS, UNSPECIFIED (HCC): Status: RESOLVED | Noted: 2020-02-28 | Resolved: 2022-11-03

## 2022-11-03 PROBLEM — S82.892A CLOSED FRACTURE OF LEFT ANKLE: Status: RESOLVED | Noted: 2018-04-27 | Resolved: 2022-11-03

## 2022-11-03 PROBLEM — J18.9 PNEUMONIA OF LEFT LOWER LOBE DUE TO INFECTIOUS ORGANISM: Status: RESOLVED | Noted: 2020-02-05 | Resolved: 2022-11-03

## 2022-11-03 PROCEDURE — G8399 PT W/DXA RESULTS DOCUMENT: HCPCS | Performed by: FAMILY MEDICINE

## 2022-11-03 PROCEDURE — 3078F DIAST BP <80 MM HG: CPT | Performed by: FAMILY MEDICINE

## 2022-11-03 PROCEDURE — 90694 VACC AIIV4 NO PRSRV 0.5ML IM: CPT | Performed by: FAMILY MEDICINE

## 2022-11-03 PROCEDURE — 1036F TOBACCO NON-USER: CPT | Performed by: FAMILY MEDICINE

## 2022-11-03 PROCEDURE — G8417 CALC BMI ABV UP PARAM F/U: HCPCS | Performed by: FAMILY MEDICINE

## 2022-11-03 PROCEDURE — G8427 DOCREV CUR MEDS BY ELIG CLIN: HCPCS | Performed by: FAMILY MEDICINE

## 2022-11-03 PROCEDURE — 1123F ACP DISCUSS/DSCN MKR DOCD: CPT | Performed by: FAMILY MEDICINE

## 2022-11-03 PROCEDURE — 3074F SYST BP LT 130 MM HG: CPT | Performed by: FAMILY MEDICINE

## 2022-11-03 PROCEDURE — 99214 OFFICE O/P EST MOD 30 MIN: CPT | Performed by: FAMILY MEDICINE

## 2022-11-03 PROCEDURE — G0008 ADMIN INFLUENZA VIRUS VAC: HCPCS | Performed by: FAMILY MEDICINE

## 2022-11-03 PROCEDURE — 3023F SPIROM DOC REV: CPT | Performed by: FAMILY MEDICINE

## 2022-11-03 PROCEDURE — 1090F PRES/ABSN URINE INCON ASSESS: CPT | Performed by: FAMILY MEDICINE

## 2022-11-03 PROCEDURE — G8484 FLU IMMUNIZE NO ADMIN: HCPCS | Performed by: FAMILY MEDICINE

## 2022-11-03 RX ORDER — FLUTICASONE PROPIONATE AND SALMETEROL 250; 50 UG/1; UG/1
1 POWDER RESPIRATORY (INHALATION) EVERY 12 HOURS
Qty: 180 EACH | Refills: 1 | Status: SHIPPED | OUTPATIENT
Start: 2022-11-03

## 2022-11-03 ASSESSMENT — ENCOUNTER SYMPTOMS
VOMITING: 0
DIARRHEA: 0
ABDOMINAL PAIN: 0
CHEST TIGHTNESS: 0
COUGH: 0
NAUSEA: 0
SHORTNESS OF BREATH: 1
CONSTIPATION: 0
WHEEZING: 0
BLOOD IN STOOL: 0
ANAL BLEEDING: 0

## 2022-11-03 ASSESSMENT — PATIENT HEALTH QUESTIONNAIRE - PHQ9
5. POOR APPETITE OR OVEREATING: 0
6. FEELING BAD ABOUT YOURSELF - OR THAT YOU ARE A FAILURE OR HAVE LET YOURSELF OR YOUR FAMILY DOWN: 0
8. MOVING OR SPEAKING SO SLOWLY THAT OTHER PEOPLE COULD HAVE NOTICED. OR THE OPPOSITE, BEING SO FIGETY OR RESTLESS THAT YOU HAVE BEEN MOVING AROUND A LOT MORE THAN USUAL: 0
3. TROUBLE FALLING OR STAYING ASLEEP: 0
1. LITTLE INTEREST OR PLEASURE IN DOING THINGS: 0
9. THOUGHTS THAT YOU WOULD BE BETTER OFF DEAD, OR OF HURTING YOURSELF: 0
SUM OF ALL RESPONSES TO PHQ QUESTIONS 1-9: 0
SUM OF ALL RESPONSES TO PHQ QUESTIONS 1-9: 0
SUM OF ALL RESPONSES TO PHQ9 QUESTIONS 1 & 2: 0
SUM OF ALL RESPONSES TO PHQ QUESTIONS 1-9: 0
4. FEELING TIRED OR HAVING LITTLE ENERGY: 0
2. FEELING DOWN, DEPRESSED OR HOPELESS: 0
7. TROUBLE CONCENTRATING ON THINGS, SUCH AS READING THE NEWSPAPER OR WATCHING TELEVISION: 0
10. IF YOU CHECKED OFF ANY PROBLEMS, HOW DIFFICULT HAVE THESE PROBLEMS MADE IT FOR YOU TO DO YOUR WORK, TAKE CARE OF THINGS AT HOME, OR GET ALONG WITH OTHER PEOPLE: 0
SUM OF ALL RESPONSES TO PHQ QUESTIONS 1-9: 0

## 2022-11-03 NOTE — PROGRESS NOTES
Angel Mendes (: 1940) is a [de-identified] y.o. female, Established patient, who presents today for:    Chief Complaint   Patient presents with    New Patient     Previously Margy Castellon's patient. ASSESSMENT/PLAN    1. Essential hypertension  Assessment & Plan:   Well-controlled, continue current medications, continue current treatment plan and medication adherence emphasized  Orders:  -     Basic Metabolic Panel; Future  2. Pulmonary hypertension, moderate to severe [facility])  Assessment & Plan:   Currently asymptomatic. I will have office staff help patient get established with pulmonology for long-term follow-up. 3. Hyperlipidemia, unspecified hyperlipidemia type  Assessment & Plan:   Well-controlled, continue current medications, continue current treatment plan, medication adherence emphasized and lifestyle modifications recommended  4. Renal insufficiency  -     Basic Metabolic Panel; Future  5. 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 I reviewed with patient at length the importance of baseline medication management to lessen the risk of future exacerbations. Patient agrees to restart Advair for long-term management. Due to supplemental oxygen requirement I have referred patient to pulmonology for long-term follow-up. Orders:  -     fluticasone-salmeterol (ADVAIR DISKUS) 250-50 MCG/ACT AEPB diskus inhaler; Inhale 1 puff into the lungs in the morning and 1 puff in the evening., Disp-180 each, R-1Normal  -     Leyda Kendall MD, Pulmonology, [facility]  6. Pre-diabetes  Assessment & Plan:  I reviewed with patient the importance of a lower carbohydrate diet to help with purposeful weight loss and reducing the risk of progression to diabetes. 7. Morbid obesity ([facility] [address]  Assessment & Plan:  Patient counseled on healthy dietary choices and the benefits of a lower salt and/or lower carbohydrate diet as appropriate.  Patient also counseled on benefits of moderate intensity cardiovascular exercise for 150 minutes per week as they are able. Advice was given to make small changes over time, setting smaller achievable goals until recommended lifestyle changes are reached. 8. Major depressive disorder, remission status unspecified, unspecified whether recurrent  Assessment & Plan:  Stable mood on medication. Patient denies any SI/HI or self harming behaviors. She was instructed to continue with current dose of sertraline. 9. Generalized anxiety disorder  Assessment & Plan:  Managed well with use of medication. Patient denies problems with anxiety or panic attacks. She was instructed to continue with current dose of sertraline. 10. Osteopenia of multiple sites  -     DEXA BONE DENSITY AXIAL SKELETON; Future  11. Hypokalemia  Comments:  Noted on recent lab work. Likely related to HCTZ. Will repeat testing and prescribe long-term Klor-con refill if within normal limits on supplmentation. 12. Need for vaccination  -     Influenza, FLUAD, (age 72 y+), IM, Preservative Free, 0.5 mL    Return in about 4 months (around 3/3/2023) for Chronic Disease Check. SUBJECTIVE/OBJECTIVE:    HPI    Patient presents to establish care. Previously seen in the office by Alejandra Baires CNP. There is a known history of COPD with asthma contributing to hypoxia and chronic use of supplemental oxygen at 2L via NC. Patient denies being established with pulmonology for long term follow-up. She reports having inhaler medication for COPD management, but states that she has not been taking medication due to not noticing any respiratory symptoms in day-to-day life. Patient has a known history of hypertension and hyperlipidemia managed with medication. Patient reports a short course of potassium supplement was sent to her local pharmacy by her previous PCP, but she has not yet started this medication.  Patient reports being on potassium supplement in the past.  She reports taking hydrochlorothiazide daily for management of lower extremity edema in addition to her hypertension. Patient endorses a known history of anxiety and depressed mood managed well with use of medication. There is no reported SI/HI or self harming behaviors. Patient denies any panic attacks. Patient denies any problems with sleep, and previously diagnosed restless leg syndrome is managed well with use of medication long-term.     Current Outpatient Medications on File Prior to Visit   Medication Sig Dispense Refill    potassium chloride (KLOR-CON M) 10 MEQ extended release tablet Take 1 tablet by mouth daily 14 tablet 0    rOPINIRole (REQUIP) 1 MG tablet TAKE 1 TABLET BY MOUTH THREE TIMES DAILY 90 tablet 0    atorvastatin (LIPITOR) 40 MG tablet Take 1 tablet by mouth once daily 90 tablet 0    sertraline (ZOLOFT) 50 MG tablet Take 1 tablet by mouth once daily 90 tablet 0    olmesartan (BENICAR) 40 MG tablet Take 1 tablet by mouth once daily 90 tablet 1    hydroCHLOROthiazide (HYDRODIURIL) 25 MG tablet Take 1 tablet by mouth once daily 90 tablet 1    baclofen (LIORESAL) 20 MG tablet Take 1 tablet by mouth 2 times daily 60 tablet 5    fexofenadine (ALLEGRA) 180 MG tablet Take 1 tablet by mouth daily 90 tablet 1    fluticasone (FLONASE) 50 MCG/ACT nasal spray 1 spray by Nasal route daily 48 g 0    Blood Pressure Monitoring (SPHYGMOMANOMETER) MISC 1 Device by Does not apply route daily 1 each 0    ipratropium-albuterol (DUONEB) 0.5-2.5 (3) MG/3ML SOLN nebulizer solution Inhale 3 mLs into the lungs (Patient not taking: Reported on 11/3/2022)      albuterol sulfate HFA (VENTOLIN HFA) 108 (90 Base) MCG/ACT inhaler Inhale 2 puffs into the lungs every 6 hours as needed for Wheezing (Patient not taking: Reported on 11/3/2022) 1 Inhaler 5    polyethyl glycol-propyl glycol 0.4-0.3 % (SYSTANE) 0.4-0.3 % ophthalmic solution 1 drop as needed for Dry Eyes (Patient not taking: Reported on 11/3/2022)       No current facility-administered medications on file prior to visit. Allergies   Allergen Reactions    Seasonal      Grass, trees, dust, pollen. YEAR ROUND        Review of Systems   Constitutional:  Negative for appetite change, chills, diaphoresis, fatigue, fever and unexpected weight change. Eyes:  Negative for visual disturbance. Respiratory:  Positive for shortness of breath (With exertion, chronic). Negative for cough, chest tightness and wheezing. Cardiovascular:  Negative for chest pain, palpitations and leg swelling. No orthopnea, No PND   Gastrointestinal:  Negative for abdominal pain, anal bleeding, blood in stool, constipation, diarrhea, nausea and vomiting. No heartburn, No melena   Endocrine: Negative for cold intolerance, heat intolerance, polydipsia, polyphagia and polyuria. Genitourinary:  Negative for dysuria and hematuria. Musculoskeletal:  Negative for myalgias. Skin:  Negative for rash. Neurological:  Negative for dizziness, syncope, weakness, light-headedness, numbness and headaches. Psychiatric/Behavioral:  Negative for dysphoric mood, self-injury, sleep disturbance and suicidal ideas. The patient is not nervous/anxious. Vitals:  /68 (Site: Right Wrist, Position: Sitting, Cuff Size: Medium Adult)   Pulse 59   Temp 98.1 °F (36.7 °C) (Temporal)   Ht 4' 11\" (1.499 m)   Wt 202 lb (91.6 kg)   SpO2 92%   BMI 40.80 kg/m²     Physical Exam  Vitals reviewed. Constitutional:       General: She is not in acute distress. Appearance: She is obese. She is not ill-appearing, toxic-appearing or diaphoretic. Interventions: Nasal cannula in place. Eyes:      General: No scleral icterus. Neck:      Thyroid: No thyroid mass, thyromegaly or thyroid tenderness. Vascular: No carotid bruit. Cardiovascular:      Rate and Rhythm: Normal rate and regular rhythm. Heart sounds: Normal heart sounds. No murmur heard.   Pulmonary:      Effort: Pulmonary effort is normal. No tachypnea, accessory muscle usage or respiratory distress. Breath sounds: Decreased air movement present. Decreased breath sounds present. No wheezing, rhonchi or rales. Abdominal:      General: Bowel sounds are normal. There is no distension. Palpations: Abdomen is soft. Tenderness: There is no abdominal tenderness. There is no right CVA tenderness, left CVA tenderness, guarding or rebound. Musculoskeletal:      Cervical back: Neck supple. Right lower leg: No edema. Left lower leg: No edema. Lymphadenopathy:      Cervical: No cervical adenopathy. Skin:     Findings: No rash. Neurological:      Mental Status: She is alert and oriented to person, place, and time. Psychiatric:         Mood and Affect: Mood normal.         Behavior: Behavior normal.         Thought Content: Thought content normal.       Ortho Exam (If Applicable)              An electronic signature was used to authenticate this note.      Deborah Dewey MD

## 2022-11-04 NOTE — ASSESSMENT & PLAN NOTE
Currently asymptomatic. I will have office staff help patient get established with pulmonology for long-term follow-up.

## 2022-11-04 NOTE — ASSESSMENT & PLAN NOTE
I reviewed with patient the importance of a lower carbohydrate diet to help with purposeful weight loss and reducing the risk of progression to diabetes.

## 2022-11-04 NOTE — ASSESSMENT & PLAN NOTE
Managed well with use of medication. Patient denies problems with anxiety or panic attacks. She was instructed to continue with current dose of sertraline.

## 2022-11-04 NOTE — ASSESSMENT & PLAN NOTE
Stable mood on medication. Patient denies any SI/HI or self harming behaviors. She was instructed to continue with current dose of sertraline.

## 2022-11-04 NOTE — ASSESSMENT & PLAN NOTE
No reported respiratory complaints. I reviewed with patient at length the importance of baseline medication management to lessen the risk of future exacerbations. Patient agrees to restart Advair for long-term management. Due to supplemental oxygen requirement I have referred patient to pulmonology for long-term follow-up.

## 2022-12-16 DIAGNOSIS — M62.838 MUSCLE SPASM: ICD-10-CM

## 2022-12-16 RX ORDER — BACLOFEN 20 MG/1
20 TABLET ORAL 2 TIMES DAILY
Qty: 60 TABLET | Refills: 0 | OUTPATIENT
Start: 2022-12-16

## 2022-12-16 NOTE — TELEPHONE ENCOUNTER
Pharmacy is requesting medication refill.  Please approve or deny this request.    Rx requested:  Requested Prescriptions     Pending Prescriptions Disp Refills    baclofen (LIORESAL) 20 MG tablet [Pharmacy Med Name: Baclofen 20 MG Oral Tablet] 60 tablet 0     Sig: Take 1 tablet by mouth 2 times daily         Last Office Visit:   7/26/2022      Next Visit Date:  Future Appointments   Date Time Provider Dulce Brunson   3/6/2023  4:15 PM Halbert Brittle, MD [address]

## 2022-12-19 NOTE — TELEPHONE ENCOUNTER
Patient is requesting medication refill. Please approve or deny this request. Patient states that she is out of this medication.

## 2022-12-20 NOTE — TELEPHONE ENCOUNTER
Please find out why patient is taking this medication. Does she have chronic contractures? Problems with acute muscle spasm? This is not a medication that is typically taken chronically unless there is a very specific indication. It could contribute to respiratory depression, unsteady gait/falls/dizziness, hallucinations, depressed mood, and problems with sleep - particularly in patients over 72years of age. If there is a specific need for this medication long term it should be monitored by a specialist and followed closely by the specialist.    Send message back to me with reply.

## 2022-12-22 NOTE — TELEPHONE ENCOUNTER
Pt calling asking why her medication was refused (BACLOFEN). I asked her why she was taking the medication and she didn't know why she was ever taking it. Please advise.

## 2022-12-23 ENCOUNTER — NURSE TRIAGE (OUTPATIENT)
Dept: OTHER | Facility: CLINIC | Age: 82
End: 2022-12-23

## 2022-12-23 ENCOUNTER — TELEMEDICINE (OUTPATIENT)
Dept: FAMILY MEDICINE CLINIC | Age: 82
End: 2022-12-23
Payer: MEDICARE

## 2022-12-23 DIAGNOSIS — J30.2 SEASONAL ALLERGIES: ICD-10-CM

## 2022-12-23 DIAGNOSIS — H10.9 CONJUNCTIVITIS OF RIGHT EYE, UNSPECIFIED CONJUNCTIVITIS TYPE: Primary | ICD-10-CM

## 2022-12-23 PROCEDURE — 99213 OFFICE O/P EST LOW 20 MIN: CPT | Performed by: FAMILY MEDICINE

## 2022-12-23 PROCEDURE — 1036F TOBACCO NON-USER: CPT | Performed by: FAMILY MEDICINE

## 2022-12-23 PROCEDURE — G8399 PT W/DXA RESULTS DOCUMENT: HCPCS | Performed by: FAMILY MEDICINE

## 2022-12-23 PROCEDURE — G8427 DOCREV CUR MEDS BY ELIG CLIN: HCPCS | Performed by: FAMILY MEDICINE

## 2022-12-23 PROCEDURE — 1090F PRES/ABSN URINE INCON ASSESS: CPT | Performed by: FAMILY MEDICINE

## 2022-12-23 PROCEDURE — 1123F ACP DISCUSS/DSCN MKR DOCD: CPT | Performed by: FAMILY MEDICINE

## 2022-12-23 PROCEDURE — G8484 FLU IMMUNIZE NO ADMIN: HCPCS | Performed by: FAMILY MEDICINE

## 2022-12-23 PROCEDURE — G8417 CALC BMI ABV UP PARAM F/U: HCPCS | Performed by: FAMILY MEDICINE

## 2022-12-23 RX ORDER — POLYMYXIN B SULFATE AND TRIMETHOPRIM 1; 10000 MG/ML; [USP'U]/ML
1 SOLUTION OPHTHALMIC EVERY 4 HOURS
Qty: 10 ML | Refills: 0 | Status: SHIPPED | OUTPATIENT
Start: 2022-12-23 | End: 2022-12-30

## 2022-12-23 ASSESSMENT — ENCOUNTER SYMPTOMS
SHORTNESS OF BREATH: 0
EYE ITCHING: 1
SORE THROAT: 0
COUGH: 1
NAUSEA: 1
SINUS PAIN: 0
SINUS PRESSURE: 0
EYE PAIN: 0
RHINORRHEA: 1
CHEST TIGHTNESS: 0
VOMITING: 0
EYE DISCHARGE: 1
EYE REDNESS: 1
ABDOMINAL PAIN: 0
PHOTOPHOBIA: 0
DIARRHEA: 0
WHEEZING: 0

## 2022-12-23 NOTE — TELEPHONE ENCOUNTER
Received call from Kerrin Harada at [facility] with Red Flag Complaint. Subjective: Caller states \"Right eye is swollen\"     Current Symptoms: Right eye swelling and irritation. Drainage, some crusting in the morning  Cough - Productive, yellow, has had for a long time    Onset: 3 days ago for the eye swelling    Pain Severity: 2/10 irritation and itching    Temperature: NA     What has been tried: OTC drops    History related to the current reason for call: Sinus problems    Recommended disposition: See in Office Today    Care advice provided, patient verbalizes understanding; denies any other questions or concerns; instructed to call back for any new or worsening symptoms. Patient/Caller agrees with recommended disposition; writer provided warm transfer to Vaishlai Taveras at [facility] for appointment scheduling     Attention Provider: Thank you for allowing me to participate in the care of your patient. The patient was connected to triage in response to information provided to the ECC/PSC. Please do not respond through this encounter as the response is not directed to a shared pool.     Reason for Disposition   MILD eye pain or discomfort and wears contacts    Protocols used: Eye - Pus or Discharge-ADULT-[place]

## 2022-12-23 NOTE — PROGRESS NOTES
2022    TELEHEALTH EVALUATION -- Audio/Visual (During QXVYS-17 public health emergency)    HPI:    Diamond Cordero (:  1940) has requested an audio/video evaluation for the following concern(s):    Patient presents accompanied by daughter for acute virtual video visit RE: right eye irritation. She reports a 2 day history of irritation and itching to the right eye with redness to the eye and mucus drainage. There is reported crusted mucus drainage to the eyelids, but no eyelid pain or erythema. Patient denies any vision changes, eye pain, or sensitivity to light. There is no reported swelling to the face. Patient reports this AM noting some irritation developing to the left eye as well with similar mucus drainage. Patient reports some mild rhinitis with sneezing and infrequent non-productive cough. There are no reported close sick contacts with similar symptoms and patient denies any recent travel. Review of Systems   Constitutional:  Negative for appetite change, chills, diaphoresis and fever. HENT:  Positive for rhinorrhea and sneezing. Negative for congestion, sinus pressure, sinus pain and sore throat. Eyes:  Positive for discharge, redness and itching. Negative for photophobia, pain and visual disturbance. Respiratory:  Positive for cough. Negative for chest tightness, shortness of breath and wheezing. Cardiovascular:  Negative for chest pain, palpitations and leg swelling. Gastrointestinal:  Positive for nausea. Negative for abdominal pain, diarrhea and vomiting. Genitourinary:  Negative for dysuria and hematuria. Musculoskeletal:  Negative for myalgias. Skin:  Negative for rash. Neurological:  Negative for dizziness and headaches. Prior to Visit Medications    Medication Sig Taking?  Authorizing Provider   trimethoprim-polymyxin b (POLYTRIM) 78670-0.1 UNIT/ML-% ophthalmic solution Place 1 drop into both eyes every 4 hours for 7 days Yes Sam Negro MD fluticasone-salmeterol (ADVAIR DISKUS) 250-50 MCG/ACT AEPB diskus inhaler Inhale 1 puff into the lungs in the morning and 1 puff in the evening.   Gerber Hale MD   potassium chloride (KLOR-CON M) 10 MEQ extended release tablet Take 1 tablet by mouth daily  JUAN Cesar CNP   rOPINIRole (REQUIP) 1 MG tablet TAKE 1 TABLET BY MOUTH THREE TIMES DAILY  JUAN Cesar CNP   atorvastatin (LIPITOR) 40 MG tablet Take 1 tablet by mouth once daily  JUAN Cesar CNP   sertraline (ZOLOFT) 50 MG tablet Take 1 tablet by mouth once daily  JUAN Cesar CNP   olmesartan (BENICAR) 40 MG tablet Take 1 tablet by mouth once daily  JUAN Cesar CNP   hydroCHLOROthiazide (HYDRODIURIL) 25 MG tablet Take 1 tablet by mouth once daily  JUAN Cesar CNP   baclofen (LIORESAL) 20 MG tablet Take 1 tablet by mouth 2 times daily  JUAN Cesar CNP   fexofenadine (ALLEGRA) 180 MG tablet Take 1 tablet by mouth daily  JUAN Cesar CNP   fluticasone (FLONASE) 50 MCG/ACT nasal spray 1 spray by Nasal route daily  JUAN Cesar CNP   ipratropium-albuterol (DUONEB) 0.5-2.5 (3) MG/3ML SOLN nebulizer solution Inhale 3 mLs into the lungs  Patient not taking: Reported on 11/3/2022  Historical Provider, MD   Blood Pressure Monitoring (SPHYGMOMANOMETER) MISC 1 Device by Does not apply route daily  JUAN Cesar CNP   albuterol sulfate HFA (VENTOLIN HFA) 108 (90 Base) MCG/ACT inhaler Inhale 2 puffs into the lungs every 6 hours as needed for Wheezing  Patient not taking: Reported on 11/3/2022  Marisol Greene MD   polyethyl glycol-propyl glycol 0.4-0.3 % (SYSTANE) 0.4-0.3 % ophthalmic solution 1 drop as needed for Dry Eyes  Patient not taking: Reported on 11/3/2022  Historical Provider, MD       Social History     Tobacco Use    Smoking status: Former     Packs/day: 2.00     Years: 20.00     Pack years: 40.00     Types: Cigarettes     Quit date: 3/7/1990 Years since quittin.8     Passive exposure: Past    Smokeless tobacco: Never    Tobacco comments:     quit 25 years ago   Vaping Use    Vaping Use: Never used   Substance Use Topics    Alcohol use: Yes     Comment: occ    Drug use: No            PHYSICAL EXAMINATION:  [ INSTRUCTIONS:  \"[x]\" Indicates a positive item  \"[]\" Indicates a negative item  -- DELETE ALL ITEMS NOT EXAMINED]  Vital Signs: (As obtained by patient/caregiver or practitioner observation)    Blood pressure-  Heart rate-    Respiratory rate-    Temperature-  Pulse oximetry-     Constitutional: [x] Appears well-developed and well-nourished [x] No apparent distress      [] Abnormal-   Mental status  [x] Alert and awake  [x] Oriented to person/place/time [x]Able to follow commands      Eyes:  EOM    [x]  Normal  [] Abnormal-  Sclera  []  Normal  [x] Abnormal - right eye mildly erythematous         Discharge []  None visible  [x] Abnormal - mucus drainage noted from right eye  No noted swelling or erythema to eyelids. No noted facial swelling    HENT:   [x] Normocephalic, atraumatic. [] Abnormal   [x] Mouth/Throat: Mucous membranes are moist.     External Ears [x] Normal  [] Abnormal-     Neck: [x] No visualized mass     Pulmonary/Chest: [x] Respiratory effort normal.  [x] No visualized signs of difficulty breathing or respiratory distress        [] Abnormal-      MNeurological:        [x] No Facial Asymmetry (Cranial nerve 7 motor function) (limited exam to video visit)          [] No gaze palsy        [] Abnormal-         Skin:        [x] No significant exanthematous lesions or discoloration noted on facial skin         [] Abnormal-            Psychiatric:       [x] Normal Affect [] No Hallucinations        [] Abnormal-     Other pertinent observable physical exam findings-     ASSESSMENT/PLAN:  1. Conjunctivitis of right eye, unspecified conjunctivitis type  Assessment & Plan:   Will cover for bacterial conjunctivitis based on history with eyedrops. Patient will use drops in both eyes. Instructions given to go to optho or ER ASAP for any eye pain, change in vision, sensitivity to light, or persistent sensation of foreign body despite treatment. Orders:  -     trimethoprim-polymyxin b (POLYTRIM) 02747-2.1 UNIT/ML-% ophthalmic solution; Place 1 drop into both eyes every 4 hours for 7 days, Disp-10 mL, R-0Normal  2. Seasonal allergies  Assessment & Plan:  Patient instructed to restart fexofenadine and fluticasone nasal spray for management of what appear to be allergy symptoms based on history. Return if symptoms worsen or fail to improve. Eder Sargent, was evaluated through a synchronous (real-time) audio-video encounter. The patient (or guardian if applicable) is aware that this is a billable service, which includes applicable co-pays. This Virtual Visit was conducted with patient's (and/or legal guardian's) consent. The visit was conducted pursuant to the emergency declaration under the [address] [place] [address], [address] [place] [address] authority and the [employer] General Act. Patient identification was verified, and a caregiver was present when appropriate. The patient was located at Home: [address] [place] [phone]. Provider was located at Home ([address]: [place]. Total time spent on this encounter: Not billed by time    --Gail Gonzalez MD on 12/23/2022 at 12:26 PM    An electronic signature was used to authenticate this note.

## 2022-12-23 NOTE — ASSESSMENT & PLAN NOTE
Patient instructed to restart fexofenadine and fluticasone nasal spray for management of what appear to be allergy symptoms based on history.

## 2022-12-23 NOTE — ASSESSMENT & PLAN NOTE
Will cover for bacterial conjunctivitis based on history with eyedrops. Patient will use drops in both eyes. Instructions given to go to optho or ER ASAP for any eye pain, change in vision, sensitivity to light, or persistent sensation of foreign body despite treatment.

## 2023-01-02 DIAGNOSIS — F41.1 GENERALIZED ANXIETY DISORDER: ICD-10-CM

## 2023-01-03 NOTE — TELEPHONE ENCOUNTER
Pharmacy is requesting medication refill.  Please approve or deny this request.    Rx requested:  Requested Prescriptions     Pending Prescriptions Disp Refills    sertraline (ZOLOFT) 50 MG tablet [Pharmacy Med Name: Sertraline HCl 50 MG Oral Tablet] 90 tablet 0     Sig: Take 1 tablet by mouth daily         Last Office Visit:   7/26/2022      Next Visit Date:  Future Appointments   Date Time Provider Dulce Brunson   3/6/2023  4:15 PM MD Adele Rosales [address]

## 2023-01-04 DIAGNOSIS — E78.5 DYSLIPIDEMIA: ICD-10-CM

## 2023-01-04 RX ORDER — ATORVASTATIN CALCIUM 40 MG/1
40 TABLET, FILM COATED ORAL DAILY
Qty: 90 TABLET | Refills: 1 | Status: SHIPPED | OUTPATIENT
Start: 2023-01-04

## 2023-01-04 NOTE — TELEPHONE ENCOUNTER
Patient is requesting medication refill.  Please approve or deny this request.    Rx requested:  Requested Prescriptions     Pending Prescriptions Disp Refills    atorvastatin (LIPITOR) 40 MG tablet [Pharmacy Med Name: Atorvastatin Calcium 40 MG Oral Tablet] 90 tablet 0     Sig: Take 1 tablet by mouth once daily         Last Office Visit:   7/26/2022      Next Visit Date:  Future Appointments   Date Time Provider Dulce Brunson   3/6/2023  4:15 PM MD Adele [address]

## 2023-01-19 DIAGNOSIS — G25.81 RESTLESS LEG SYNDROME: ICD-10-CM

## 2023-01-19 RX ORDER — ROPINIROLE 1 MG/1
1 TABLET, FILM COATED ORAL 3 TIMES DAILY
Qty: 90 TABLET | Refills: 0 | Status: SHIPPED | OUTPATIENT
Start: 2023-01-19

## 2023-01-19 NOTE — TELEPHONE ENCOUNTER
Pt is requesting medication refill.  Please approve or deny this request.    Rx requested:  Requested Prescriptions     Pending Prescriptions Disp Refills    rOPINIRole (REQUIP) 1 MG tablet 90 tablet 0         Last Office Visit:   12/23/2022      Next Visit Date:  Future Appointments   Date Time Provider Dulce Brunson   3/6/2023  4:15 PM MD Adele [address]

## 2023-01-19 NOTE — TELEPHONE ENCOUNTER
Pt is out  and requesting a refill on restless leg meds -    LOV 12/23/2022  FOV 3/6/2023 [Follow-Up] : a follow-up visit for [Chest Pain] : chest pain [Murmurs] : a murmur [Patient] : patient [Mother] : mother

## 2023-03-06 ENCOUNTER — OFFICE VISIT (OUTPATIENT)
Dept: FAMILY MEDICINE CLINIC | Age: 83
End: 2023-03-06
Payer: MEDICARE

## 2023-03-06 VITALS
SYSTOLIC BLOOD PRESSURE: 140 MMHG | TEMPERATURE: 98.2 F | HEIGHT: 59 IN | DIASTOLIC BLOOD PRESSURE: 76 MMHG | WEIGHT: 200 LBS | HEART RATE: 71 BPM | BODY MASS INDEX: 40.32 KG/M2 | OXYGEN SATURATION: 96 %

## 2023-03-06 DIAGNOSIS — D64.9 NORMOCYTIC ANEMIA: ICD-10-CM

## 2023-03-06 DIAGNOSIS — I10 ESSENTIAL HYPERTENSION: Primary | ICD-10-CM

## 2023-03-06 DIAGNOSIS — R73.03 PRE-DIABETES: ICD-10-CM

## 2023-03-06 DIAGNOSIS — E66.01 MORBID OBESITY (HCC): ICD-10-CM

## 2023-03-06 DIAGNOSIS — J44.9 ASTHMA WITH COPD (CHRONIC OBSTRUCTIVE PULMONARY DISEASE) (HCC): ICD-10-CM

## 2023-03-06 DIAGNOSIS — F33.1 MODERATE EPISODE OF RECURRENT MAJOR DEPRESSIVE DISORDER (HCC): ICD-10-CM

## 2023-03-06 DIAGNOSIS — E78.5 HYPERLIPIDEMIA, UNSPECIFIED HYPERLIPIDEMIA TYPE: ICD-10-CM

## 2023-03-06 DIAGNOSIS — I27.20 PULMONARY HYPERTENSION, MODERATE TO SEVERE (HCC): ICD-10-CM

## 2023-03-06 PROCEDURE — G8427 DOCREV CUR MEDS BY ELIG CLIN: HCPCS | Performed by: FAMILY MEDICINE

## 2023-03-06 PROCEDURE — G8484 FLU IMMUNIZE NO ADMIN: HCPCS | Performed by: FAMILY MEDICINE

## 2023-03-06 PROCEDURE — 99214 OFFICE O/P EST MOD 30 MIN: CPT | Performed by: FAMILY MEDICINE

## 2023-03-06 PROCEDURE — 3077F SYST BP >= 140 MM HG: CPT | Performed by: FAMILY MEDICINE

## 2023-03-06 PROCEDURE — 1036F TOBACCO NON-USER: CPT | Performed by: FAMILY MEDICINE

## 2023-03-06 PROCEDURE — 3023F SPIROM DOC REV: CPT | Performed by: FAMILY MEDICINE

## 2023-03-06 PROCEDURE — 1090F PRES/ABSN URINE INCON ASSESS: CPT | Performed by: FAMILY MEDICINE

## 2023-03-06 PROCEDURE — 1123F ACP DISCUSS/DSCN MKR DOCD: CPT | Performed by: FAMILY MEDICINE

## 2023-03-06 PROCEDURE — G8399 PT W/DXA RESULTS DOCUMENT: HCPCS | Performed by: FAMILY MEDICINE

## 2023-03-06 PROCEDURE — 3078F DIAST BP <80 MM HG: CPT | Performed by: FAMILY MEDICINE

## 2023-03-06 PROCEDURE — G8417 CALC BMI ABV UP PARAM F/U: HCPCS | Performed by: FAMILY MEDICINE

## 2023-03-06 RX ORDER — OLMESARTAN MEDOXOMIL AND HYDROCHLOROTHIAZIDE 40/25 40; 25 MG/1; MG/1
1 TABLET ORAL DAILY
Qty: 90 TABLET | Refills: 1 | Status: SHIPPED | OUTPATIENT
Start: 2023-03-06

## 2023-03-06 SDOH — ECONOMIC STABILITY: INCOME INSECURITY: HOW HARD IS IT FOR YOU TO PAY FOR THE VERY BASICS LIKE FOOD, HOUSING, MEDICAL CARE, AND HEATING?: NOT HARD AT ALL

## 2023-03-06 SDOH — ECONOMIC STABILITY: FOOD INSECURITY: WITHIN THE PAST 12 MONTHS, THE FOOD YOU BOUGHT JUST DIDN'T LAST AND YOU DIDN'T HAVE MONEY TO GET MORE.: NEVER TRUE

## 2023-03-06 SDOH — ECONOMIC STABILITY: HOUSING INSECURITY
IN THE LAST 12 MONTHS, WAS THERE A TIME WHEN YOU DID NOT HAVE A STEADY PLACE TO SLEEP OR SLEPT IN A SHELTER (INCLUDING NOW)?: NO

## 2023-03-06 SDOH — ECONOMIC STABILITY: FOOD INSECURITY: WITHIN THE PAST 12 MONTHS, YOU WORRIED THAT YOUR FOOD WOULD RUN OUT BEFORE YOU GOT MONEY TO BUY MORE.: NEVER TRUE

## 2023-03-06 ASSESSMENT — ENCOUNTER SYMPTOMS
CHEST TIGHTNESS: 0
WHEEZING: 0
ANAL BLEEDING: 0
NAUSEA: 0
ABDOMINAL PAIN: 0
DIARRHEA: 0
CONSTIPATION: 0
BLOOD IN STOOL: 0
VOMITING: 0
COUGH: 0
SHORTNESS OF BREATH: 1

## 2023-03-06 ASSESSMENT — PATIENT HEALTH QUESTIONNAIRE - PHQ9
9. THOUGHTS THAT YOU WOULD BE BETTER OFF DEAD, OR OF HURTING YOURSELF: 0
SUM OF ALL RESPONSES TO PHQ QUESTIONS 1-9: 0
1. LITTLE INTEREST OR PLEASURE IN DOING THINGS: 0
6. FEELING BAD ABOUT YOURSELF - OR THAT YOU ARE A FAILURE OR HAVE LET YOURSELF OR YOUR FAMILY DOWN: 0
SUM OF ALL RESPONSES TO PHQ9 QUESTIONS 1 & 2: 0
2. FEELING DOWN, DEPRESSED OR HOPELESS: 0
8. MOVING OR SPEAKING SO SLOWLY THAT OTHER PEOPLE COULD HAVE NOTICED. OR THE OPPOSITE, BEING SO FIGETY OR RESTLESS THAT YOU HAVE BEEN MOVING AROUND A LOT MORE THAN USUAL: 0
SUM OF ALL RESPONSES TO PHQ QUESTIONS 1-9: 0
4. FEELING TIRED OR HAVING LITTLE ENERGY: 0
7. TROUBLE CONCENTRATING ON THINGS, SUCH AS READING THE NEWSPAPER OR WATCHING TELEVISION: 0
10. IF YOU CHECKED OFF ANY PROBLEMS, HOW DIFFICULT HAVE THESE PROBLEMS MADE IT FOR YOU TO DO YOUR WORK, TAKE CARE OF THINGS AT HOME, OR GET ALONG WITH OTHER PEOPLE: 0
5. POOR APPETITE OR OVEREATING: 0
3. TROUBLE FALLING OR STAYING ASLEEP: 0

## 2023-03-06 NOTE — PROGRESS NOTES
Fide Carrasco (: 1940) is a 82 y.o. female, Established patient, who presents today for:    Chief Complaint   Patient presents with    Follow-up     Pt states not having any questions or concerns for today's follow up   Pt states prefers virtual visits for next follow up visit           ASSESSMENT/PLAN    1. Essential hypertension  Assessment & Plan:  Elevated blood pressure today in the office. Patient will keep close follow-up with nurse visit BP/pulse check in the next 1 to 2 weeks. If blood pressure remains elevated we will discuss further medication adjustments and patient will return for a hypertension follow-up visit to reassess. She was instructed to continue with her current doses of olmesartan and hydrochlorothiazide. I will send her refill in as a combination tablet to help with ease of dosing. Orders:  -     Basic Metabolic Panel; Future  -     olmesartan-hydroCHLOROthiazide (BENICAR HCT) 40-25 MG per tablet; Take 1 tablet by mouth daily, Disp-90 tablet, R-1Normal  2. Pulmonary hypertension, moderate to severe [facility])  Assessment & Plan:  Currently asymptomatic. I will have office staff help patient get reestablished with pulmonology for overdue long-term follow-up visit. Orders:  -     CBC with Auto Differential; Future  -     Lyle Schreiber MD, Pulmonology, [place]  3. Hyperlipidemia, unspecified hyperlipidemia type  Assessment & Plan:  Most recent lipid panel stable. Patient instructed to continue with current dose of atorvastatin. 4. Pre-diabetes  Assessment & Plan: We will continue to follow lab work over time. I stressed with patient the importance of a lower carbohydrate diet to help with purposeful weight loss and reducing the risk of progression to diabetes. Orders:  -     Hemoglobin A1C; Future  5. 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   Patient instructed to continue with current dose of Advair and to use as needed inhalers for symptom relief. We will continue to monitor over time. I will have office staff help patient schedule overdue follow-up visit with pulmonology office. Orders:  -     lAessio Hermosillo MD, Pulmonology, [address] [place] [address]  6. Moderate episode of recurrent major depressive disorder [facility])  Assessment & Plan:  Mood stable. No SI/HI or self harming behaviors. Patient instructed to continue with current dose of sertraline  7. Normocytic anemia  -     Basic Metabolic Panel; Future  -     CBC with Auto Differential; Future  8. Morbid obesity ([address]  Assessment & Plan:  Patient counseled on healthy dietary choices and the benefits of a lower salt and/or lower carbohydrate diet as appropriate. Patient also counseled on benefits of moderate intensity cardiovascular exercise for 150 minutes per week as they are able. Advice was given to make small changes over time, setting smaller achievable goals until recommended lifestyle changes are reached. Orders:  -     Hemoglobin A1C; Future    Return for Nurse visit BP check in 1-2 wks, Medicare AWV in 3 Months (VIRTUAL). SUBJECTIVE/OBJECTIVE:    HPI    Patient presents for chronic hypertension, hyperlipidemia, prediabetes, obesity, and COPD follow-up. Patient has been established with pulmonology but is overdue for follow-up visit with the specialist.  Patient reports taking medications as prescribed since the most recent visit. They deny adhering to any specific lower carbohydrate or lower salt diet. They deny any routine exercise outside of normal day-to-day activity.         Current Outpatient Medications on File Prior to Visit   Medication Sig Dispense Refill    rOPINIRole (REQUIP) 1 MG tablet Take 1 tablet by mouth 3 times daily 90 tablet 0    atorvastatin (LIPITOR) 40 MG tablet Take 1 tablet by mouth daily 90 tablet 1    sertraline (ZOLOFT) 50 MG tablet Take 1 tablet by mouth daily 90 tablet 1    fluticasone-salmeterol (ADVAIR DISKUS) 250-50 MCG/ACT AEPB diskus inhaler Inhale 1 puff into the lungs in the morning and 1 puff in the evening. 180 each 1    potassium chloride (KLOR-CON M) 10 MEQ extended release tablet Take 1 tablet by mouth daily 14 tablet 0    baclofen (LIORESAL) 20 MG tablet Take 1 tablet by mouth 2 times daily 60 tablet 5    fexofenadine (ALLEGRA) 180 MG tablet Take 1 tablet by mouth daily 90 tablet 1    fluticasone (FLONASE) 50 MCG/ACT nasal spray 1 spray by Nasal route daily 48 g 0    Blood Pressure Monitoring (SPHYGMOMANOMETER) MISC 1 Device by Does not apply route daily 1 each 0    ipratropium-albuterol (DUONEB) 0.5-2.5 (3) MG/3ML SOLN nebulizer solution Inhale 3 mLs into the lungs (Patient not taking: No sig reported)      albuterol sulfate HFA (VENTOLIN HFA) 108 (90 Base) MCG/ACT inhaler Inhale 2 puffs into the lungs every 6 hours as needed for Wheezing (Patient not taking: No sig reported) 1 Inhaler 5    polyethyl glycol-propyl glycol 0.4-0.3 % (SYSTANE) 0.4-0.3 % ophthalmic solution 1 drop as needed for Dry Eyes (Patient not taking: No sig reported)       No current facility-administered medications on file prior to visit. Allergies   Allergen Reactions    Seasonal      Grass, trees, dust, pollen. YEAR ROUND        Review of Systems   Constitutional:  Negative for appetite change, chills, diaphoresis, fatigue, fever and unexpected weight change. Eyes:  Negative for visual disturbance. Respiratory:  Positive for shortness of breath (chronic, stable). Negative for cough, chest tightness and wheezing. Cardiovascular:  Negative for chest pain, palpitations and leg swelling. No orthopnea, No PND   Gastrointestinal:  Negative for abdominal pain, anal bleeding, blood in stool, constipation, diarrhea, nausea and vomiting. No heartburn, No melena   Endocrine: Negative for cold intolerance, heat intolerance, polydipsia, polyphagia and polyuria. Genitourinary:  Negative for dysuria and hematuria. Musculoskeletal:  Negative for myalgias. Skin:  Negative for rash. Neurological:  Negative for dizziness, syncope, weakness, light-headedness, numbness and headaches. Psychiatric/Behavioral:  Negative for dysphoric mood, self-injury and suicidal ideas. The patient is not nervous/anxious. Vitals:  BP (!) 140/76 (Site: Left Upper Arm, Position: Sitting, Cuff Size: Large Adult) Comment: pt satates being anxious about being in the office  Pulse 71   Temp 98.2 °F (36.8 °C) (Temporal)   Ht 4' 11\" (1.499 m)   Wt 200 lb (90.7 kg)   SpO2 96%   BMI 40.40 kg/m²     Physical Exam  Vitals reviewed. Constitutional:       General: She is not in acute distress. Appearance: She is obese. She is not ill-appearing. Eyes:      General: No scleral icterus. Neck:      Thyroid: No thyroid mass, thyromegaly or thyroid tenderness. Vascular: No carotid bruit. Cardiovascular:      Rate and Rhythm: Normal rate and regular rhythm. Heart sounds: Normal heart sounds. No murmur heard. Pulmonary:      Effort: Pulmonary effort is normal. No respiratory distress. Breath sounds: Normal breath sounds. No wheezing, rhonchi or rales. Abdominal:      General: Bowel sounds are normal. There is no distension. Palpations: Abdomen is soft. Tenderness: There is no abdominal tenderness. There is no right CVA tenderness, left CVA tenderness, guarding or rebound. Musculoskeletal:      Cervical back: Neck supple. Right lower leg: No edema. Left lower leg: No edema. Lymphadenopathy:      Cervical: No cervical adenopathy. Skin:     Findings: No rash. Neurological:      Mental Status: She is alert and oriented to person, place, and time. Psychiatric:         Mood and Affect: Mood and affect normal.         Speech: Speech normal.         Behavior: Behavior normal.         Thought Content:  Thought content normal.       Ortho Exam (If Applicable)              An electronic signature was used to authenticate this note.      Shania Moy MD

## 2023-03-06 NOTE — ASSESSMENT & PLAN NOTE
Mood stable. No SI/HI or self harming behaviors.   Patient instructed to continue with current dose of sertraline

## 2023-03-06 NOTE — ASSESSMENT & PLAN NOTE
We will continue to follow lab work over time. I stressed with patient the importance of a lower carbohydrate diet to help with purposeful weight loss and reducing the risk of progression to diabetes.

## 2023-03-06 NOTE — ASSESSMENT & PLAN NOTE
Currently asymptomatic. I will have office staff help patient get reestablished with pulmonology for overdue long-term follow-up visit.

## 2023-03-06 NOTE — ASSESSMENT & PLAN NOTE
Stable respiratory status. Patient instructed to continue with current dose of Advair and to use as needed inhalers for symptom relief. We will continue to monitor over time. I will have office staff help patient schedule overdue follow-up visit with pulmonology office.

## 2023-03-06 NOTE — ASSESSMENT & PLAN NOTE
Elevated blood pressure today in the office. Patient will keep close follow-up with nurse visit BP/pulse check in the next 1 to 2 weeks. If blood pressure remains elevated we will discuss further medication adjustments and patient will return for a hypertension follow-up visit to reassess. She was instructed to continue with her current doses of olmesartan and hydrochlorothiazide. I will send her refill in as a combination tablet to help with ease of dosing.

## 2023-03-06 NOTE — ASSESSMENT & PLAN NOTE
Most recent lipid panel stable.  Patient instructed to continue with current dose of atorvastatin.

## 2023-03-29 DIAGNOSIS — G25.81 RESTLESS LEG SYNDROME: ICD-10-CM

## 2023-03-30 RX ORDER — ROPINIROLE 1 MG/1
1 TABLET, FILM COATED ORAL 3 TIMES DAILY
Qty: 90 TABLET | Refills: 2 | Status: SHIPPED | OUTPATIENT
Start: 2023-03-30

## 2023-03-30 NOTE — TELEPHONE ENCOUNTER
Comments: will need to call for future appt    Last Office Visit (last PCP visit):   3/6/2023    Next Visit Date:  Future Appointments   Date Time Provider Dulce Brunson   4/26/2023 10:45 AM Cassie De Paz MD [facility]       **If hasn't been seen in over a year OR hasn't followed up according to last diabetes/ADHD visit, make appointment for patient before sending refill to provider.     Rx requested:  Requested Prescriptions     Pending Prescriptions Disp Refills    rOPINIRole (REQUIP) 1 MG tablet [Pharmacy Med Name: rOPINIRole HCl 1 MG Oral Tablet] 90 tablet 0     Sig: TAKE 1 TABLET BY MOUTH THREE TIMES DAILY

## 2023-07-12 DIAGNOSIS — F41.1 GENERALIZED ANXIETY DISORDER: ICD-10-CM

## 2023-07-12 NOTE — TELEPHONE ENCOUNTER
Patient is requesting medication refill. Please approve or deny this request.    Rx requested:  Requested Prescriptions     Pending Prescriptions Disp Refills    sertraline (ZOLOFT) 50 MG tablet [Pharmacy Med Name: Sertraline HCl 50 MG Oral Tablet] 90 tablet 0     Sig: Take 1 tablet by mouth once daily         Last Office Visit:   3/6/2023      Next Visit Date:  No future appointments.

## 2023-07-14 DIAGNOSIS — F41.1 GENERALIZED ANXIETY DISORDER: ICD-10-CM

## 2023-07-17 DIAGNOSIS — E78.5 DYSLIPIDEMIA: ICD-10-CM

## 2023-07-17 RX ORDER — ATORVASTATIN CALCIUM 40 MG/1
40 TABLET, FILM COATED ORAL DAILY
Qty: 90 TABLET | Refills: 0 | Status: SHIPPED | OUTPATIENT
Start: 2023-07-17

## 2023-07-17 NOTE — TELEPHONE ENCOUNTER
Pharmacy is requesting medication refill. Please approve or deny this request.    Rx requested:  Requested Prescriptions     Pending Prescriptions Disp Refills    atorvastatin (LIPITOR) 40 MG tablet [Pharmacy Med Name: Atorvastatin Calcium 40 MG Oral Tablet] 90 tablet 0     Sig: Take 1 tablet by mouth daily         Last Office Visit:   3/6/2023      Next Visit Date:  No future appointments.

## 2023-08-17 ENCOUNTER — TELEMEDICINE (OUTPATIENT)
Dept: FAMILY MEDICINE CLINIC | Age: 83
End: 2023-08-17
Payer: MEDICARE

## 2023-08-17 DIAGNOSIS — R73.03 PRE-DIABETES: ICD-10-CM

## 2023-08-17 DIAGNOSIS — J44.9 ASTHMA WITH COPD (CHRONIC OBSTRUCTIVE PULMONARY DISEASE) (HCC): Chronic | ICD-10-CM

## 2023-08-17 DIAGNOSIS — E78.5 HYPERLIPIDEMIA, UNSPECIFIED HYPERLIPIDEMIA TYPE: ICD-10-CM

## 2023-08-17 DIAGNOSIS — J30.1 SEASONAL ALLERGIC RHINITIS DUE TO POLLEN: ICD-10-CM

## 2023-08-17 DIAGNOSIS — I10 ESSENTIAL HYPERTENSION: ICD-10-CM

## 2023-08-17 DIAGNOSIS — Z23 NEED FOR VACCINATION: ICD-10-CM

## 2023-08-17 DIAGNOSIS — E66.01 MORBID OBESITY (HCC): ICD-10-CM

## 2023-08-17 DIAGNOSIS — Z00.00 MEDICARE ANNUAL WELLNESS VISIT, SUBSEQUENT: Primary | ICD-10-CM

## 2023-08-17 DIAGNOSIS — F32.9 MAJOR DEPRESSIVE DISORDER, REMISSION STATUS UNSPECIFIED, UNSPECIFIED WHETHER RECURRENT: ICD-10-CM

## 2023-08-17 DIAGNOSIS — M85.89 OSTEOPENIA OF MULTIPLE SITES: ICD-10-CM

## 2023-08-17 PROCEDURE — G0439 PPPS, SUBSEQ VISIT: HCPCS | Performed by: FAMILY MEDICINE

## 2023-08-17 PROCEDURE — 1123F ACP DISCUSS/DSCN MKR DOCD: CPT | Performed by: FAMILY MEDICINE

## 2023-08-17 RX ORDER — FLUTICASONE PROPIONATE AND SALMETEROL 250; 50 UG/1; UG/1
1 POWDER RESPIRATORY (INHALATION) EVERY 12 HOURS
Qty: 180 EACH | Refills: 1 | Status: SHIPPED | OUTPATIENT
Start: 2023-08-17

## 2023-08-17 RX ORDER — IPRATROPIUM BROMIDE AND ALBUTEROL SULFATE 2.5; .5 MG/3ML; MG/3ML
3 SOLUTION RESPIRATORY (INHALATION) EVERY 6 HOURS PRN
Qty: 360 ML | Refills: 1 | Status: SHIPPED | OUTPATIENT
Start: 2023-08-17

## 2023-08-17 RX ORDER — ALBUTEROL SULFATE 90 UG/1
2 AEROSOL, METERED RESPIRATORY (INHALATION) EVERY 6 HOURS PRN
Qty: 3 EACH | Refills: 1 | Status: SHIPPED | OUTPATIENT
Start: 2023-08-17

## 2023-08-17 RX ORDER — FLUTICASONE PROPIONATE 50 MCG
1 SPRAY, SUSPENSION (ML) NASAL DAILY
Qty: 48 G | Refills: 1 | Status: SHIPPED | OUTPATIENT
Start: 2023-08-17

## 2023-08-17 RX ORDER — ZOSTER VACCINE RECOMBINANT, ADJUVANTED 50 MCG/0.5
0.5 KIT INTRAMUSCULAR SEE ADMIN INSTRUCTIONS
Qty: 0.5 ML | Refills: 0 | Status: SHIPPED | OUTPATIENT
Start: 2023-08-17 | End: 2024-02-13

## 2023-08-17 ASSESSMENT — PATIENT HEALTH QUESTIONNAIRE - PHQ9
2. FEELING DOWN, DEPRESSED OR HOPELESS: 0
6. FEELING BAD ABOUT YOURSELF - OR THAT YOU ARE A FAILURE OR HAVE LET YOURSELF OR YOUR FAMILY DOWN: 0
SUM OF ALL RESPONSES TO PHQ9 QUESTIONS 1 & 2: 0
SUM OF ALL RESPONSES TO PHQ QUESTIONS 1-9: 0
SUM OF ALL RESPONSES TO PHQ QUESTIONS 1-9: 0
8. MOVING OR SPEAKING SO SLOWLY THAT OTHER PEOPLE COULD HAVE NOTICED. OR THE OPPOSITE, BEING SO FIGETY OR RESTLESS THAT YOU HAVE BEEN MOVING AROUND A LOT MORE THAN USUAL: 0
5. POOR APPETITE OR OVEREATING: 0
9. THOUGHTS THAT YOU WOULD BE BETTER OFF DEAD, OR OF HURTING YOURSELF: 0
SUM OF ALL RESPONSES TO PHQ QUESTIONS 1-9: 0
4. FEELING TIRED OR HAVING LITTLE ENERGY: 0
1. LITTLE INTEREST OR PLEASURE IN DOING THINGS: 0
3. TROUBLE FALLING OR STAYING ASLEEP: 0
SUM OF ALL RESPONSES TO PHQ QUESTIONS 1-9: 0
10. IF YOU CHECKED OFF ANY PROBLEMS, HOW DIFFICULT HAVE THESE PROBLEMS MADE IT FOR YOU TO DO YOUR WORK, TAKE CARE OF THINGS AT HOME, OR GET ALONG WITH OTHER PEOPLE: 0
7. TROUBLE CONCENTRATING ON THINGS, SUCH AS READING THE NEWSPAPER OR WATCHING TELEVISION: 0

## 2023-08-17 ASSESSMENT — LIFESTYLE VARIABLES
HOW MANY STANDARD DRINKS CONTAINING ALCOHOL DO YOU HAVE ON A TYPICAL DAY: PATIENT DOES NOT DRINK
HOW OFTEN DO YOU HAVE A DRINK CONTAINING ALCOHOL: NEVER

## 2023-08-17 NOTE — PATIENT INSTRUCTIONS
surgeons   A team of support staff (dietitians, counselors, nurses)   Long-term follow-up after surgery   Hospital staff experienced with the care of weight loss patients. This includes nurses who are trained in the care of patients immediately after surgery and anesthesiologists who are experienced in caring for the morbidly obese. EFFECTIVENESS OF WEIGHT LOSS SURGERY -- The goal of weight loss surgery is to reduce the risk of illness or death associated with obesity. Weight loss surgery can also help you to feel and look better, reduce the amount of money you spend on medicines, and cut down on sick days. As an example, weight loss surgery can improve health problems related to obesity (diabetes, high blood pressure, high cholesterol, sleep apnea) to the point that you need less or no medicine. Finally, weight loss surgery might reduce your risk of developing heart disease, cancer, and certain infections. AFTER WEIGHT LOSS SURGERY -- You will need to stay in the hospital until your team feels that it is safe for you to leave (on average, one to three days). Do not drive if you are taking prescription pain medicine. Begin exercising as soon as possible once you have healed; most weight loss centers will design an exercise program for you. Once you are home, it is important to eat and drink exactly what your doctor and dietitian recommend. You will see your doctor, nurse, and dietitian on a regular basis after surgery to monitor your health, diet, and weight loss.    You will be able to slowly increase how much you eat over time, although it will always be important to:  Eat small, frequent meals and not skip meals   Chew your food slowly and completely   Avoid eating while \"distracted\" (such as eating while watching TV)   Stop eating when you feel full   Drink liquids at least 30 minutes before or after eating   Avoid foods high in fat or sugar   Take vitamin supplements, as recommended  It can take several

## 2023-08-17 NOTE — PROGRESS NOTES
Medicare Annual Wellness Visit    Vimal Ku is here for Medicare AWV    Assessment & Plan   1. Medicare annual wellness visit, subsequent  2. Need for vaccination  -     zoster recombinant adjuvanted vaccine [facility]) 50 MCG/0.5ML SUSR injection; Inject 0.5 mLs into the muscle See Admin Instructions 1 dose now and repeat in 2-6 months, Disp-0.5 mL, R-0Normal  3. Osteopenia of multiple sites  -     Vitamin D 25 Hydroxy; Future  -     DEXA BONE DENSITY AXIAL SKELETON; Future  4. Essential hypertension  -     CBC with Auto Differential; Future  -     Comprehensive Metabolic Panel; Future  -     TSH; Future  5. Hyperlipidemia, unspecified hyperlipidemia type  -     Comprehensive Metabolic Panel; Future  -     Lipid Panel; Future  6. Pre-diabetes  -     Comprehensive Metabolic Panel; Future  -     Hemoglobin A1C; Future  -     TSH; Future  7. Asthma with COPD (chronic obstructive pulmonary disease) (HCC)  -     CBC with Auto Differential; Future  -     albuterol sulfate HFA (VENTOLIN HFA) 108 (90 Base) MCG/ACT inhaler; Inhale 2 puffs into the lungs every 6 hours as needed for Wheezing, Disp-3 each, R-1Normal  -     fluticasone-salmeterol (ADVAIR DISKUS) 250-50 MCG/ACT AEPB diskus inhaler; Inhale 1 puff into the lungs in the morning and 1 puff in the evening., Disp-180 each, R-1Normal  -     ipratropium 0.5 mg-albuterol 2.5 mg (DUONEB) 0.5-2.5 (3) MG/3ML SOLN nebulizer solution; Inhale 3 mLs into the lungs every 6 hours as needed for Shortness of Breath, Disp-360 mL, R-1Normal  8. Seasonal allergic rhinitis due to pollen  -     fluticasone (FLONASE) 50 MCG/ACT nasal spray; 1 spray by Nasal route daily, Disp-48 g, R-1Normal  9. Major depressive disorder, remission status unspecified, unspecified whether recurrent  -     Vitamin D 25 Hydroxy; Future  -     TSH; Future  10. Morbid obesity ([address])  -     Comprehensive Metabolic Panel; Future  -     Lipid Panel;  Future  -     Hemoglobin A1C; Future  -     TSH;

## 2023-10-18 DIAGNOSIS — E78.5 DYSLIPIDEMIA: ICD-10-CM

## 2023-10-18 NOTE — TELEPHONE ENCOUNTER
Patient is requesting medication refill.  Please approve or deny this request.    Rx requested:  Requested Prescriptions     Pending Prescriptions Disp Refills    atorvastatin (LIPITOR) 40 MG tablet [Pharmacy Med Name: Atorvastatin Calcium 40 MG Oral Tablet] 90 tablet 0     Sig: Take 1 tablet by mouth once daily         Last Office Visit:   8/17/2023      Next Visit Date:  Future Appointments   Date Time Provider [address] [place] [address]   2/19/2024  4:30 PM [address] Bridget [facility] [address]

## 2023-10-19 RX ORDER — ATORVASTATIN CALCIUM 40 MG/1
40 TABLET, FILM COATED ORAL DAILY
Qty: 90 TABLET | Refills: 1 | Status: SHIPPED | OUTPATIENT
Start: 2023-10-19

## 2023-11-21 DIAGNOSIS — G25.81 RESTLESS LEG SYNDROME: ICD-10-CM

## 2023-11-21 RX ORDER — ROPINIROLE 1 MG/1
1 TABLET, FILM COATED ORAL 3 TIMES DAILY
Qty: 90 TABLET | Refills: 0 | OUTPATIENT
Start: 2023-11-21

## 2023-11-23 DIAGNOSIS — G25.81 RESTLESS LEG SYNDROME: ICD-10-CM

## 2023-11-24 NOTE — TELEPHONE ENCOUNTER
Patient is requesting medication refill.  Please approve or deny this request.    Rx requested:  Requested Prescriptions     Pending Prescriptions Disp Refills    rOPINIRole (REQUIP) 1 MG tablet [Pharmacy Med Name: rOPINIRole HCl 1 MG Oral Tablet] 90 tablet 0     Sig: TAKE 1 TABLET BY MOUTH THREE TIMES DAILY         Last Office Visit:   8/17/2023      Next Visit Date:  Future Appointments   Date Time Provider [address] [place] [address]   2/19/2024  4:30 PM [facility] [facility] Esha Bush MD [address]

## 2023-11-27 ENCOUNTER — TELEPHONE (OUTPATIENT)
Dept: FAMILY MEDICINE CLINIC | Age: 83
End: 2023-11-27

## 2023-11-27 RX ORDER — ROPINIROLE 1 MG/1
1 TABLET, FILM COATED ORAL 3 TIMES DAILY
Qty: 90 TABLET | Refills: 0 | Status: SHIPPED | OUTPATIENT
Start: 2023-11-27

## 2023-11-27 NOTE — TELEPHONE ENCOUNTER
Ropinrole is a pending order - Pt is out and Pharm already gave pt an emergency supply. .. [id number] is asking if this refill can be sent today ,...

## 2023-12-06 DIAGNOSIS — I10 ESSENTIAL HYPERTENSION: ICD-10-CM

## 2023-12-07 RX ORDER — OLMESARTAN MEDOXOMIL AND HYDROCHLOROTHIAZIDE 40/25 40; 25 MG/1; MG/1
1 TABLET ORAL DAILY
Qty: 90 TABLET | Refills: 1 | Status: SHIPPED | OUTPATIENT
Start: 2023-12-07

## 2023-12-07 NOTE — TELEPHONE ENCOUNTER
Comments:     Last Office Visit (last PCP visit):   8/17/2023    Next Visit Date:  Future Appointments   Date Time Provider [address]  [facility]   2/19/2024  4:30 PM Zeke Aranda [address]       **If hasn't been seen in over a year OR hasn't followed up according to last diabetes/ADHD visit, make appointment for patient before sending refill to provider.     Rx requested:  Requested Prescriptions     Pending Prescriptions Disp Refills    olmesartan-hydroCHLOROthiazide (BENICAR HCT) 40-25 MG per tablet [Pharmacy Med Name: Olmesartan Medoxomil-HCTZ 40-25 MG Oral Tablet] 90 tablet 0     Sig: Take 1 tablet by mouth once daily

## 2024-03-01 ENCOUNTER — OFFICE VISIT (OUTPATIENT)
Dept: FAMILY MEDICINE CLINIC | Age: 84
End: 2024-03-01

## 2024-03-01 VITALS
DIASTOLIC BLOOD PRESSURE: 68 MMHG | OXYGEN SATURATION: 95 % | SYSTOLIC BLOOD PRESSURE: 122 MMHG | TEMPERATURE: 97.5 F | HEART RATE: 71 BPM

## 2024-03-01 DIAGNOSIS — R26.81 UNSTEADY GAIT: ICD-10-CM

## 2024-03-01 DIAGNOSIS — M85.89 OSTEOPENIA OF MULTIPLE SITES: ICD-10-CM

## 2024-03-01 DIAGNOSIS — Z23 NEED FOR VACCINATION: ICD-10-CM

## 2024-03-01 DIAGNOSIS — E66.01 MORBID OBESITY (HCC): ICD-10-CM

## 2024-03-01 DIAGNOSIS — I10 ESSENTIAL HYPERTENSION: Primary | ICD-10-CM

## 2024-03-01 DIAGNOSIS — R73.03 PRE-DIABETES: ICD-10-CM

## 2024-03-01 DIAGNOSIS — R53.81 PHYSICAL DECONDITIONING: ICD-10-CM

## 2024-03-01 DIAGNOSIS — I27.20 PULMONARY HYPERTENSION, MODERATE TO SEVERE (HCC): ICD-10-CM

## 2024-03-01 DIAGNOSIS — F41.1 GENERALIZED ANXIETY DISORDER: ICD-10-CM

## 2024-03-01 DIAGNOSIS — J44.89 ASTHMA WITH COPD (CHRONIC OBSTRUCTIVE PULMONARY DISEASE): Chronic | ICD-10-CM

## 2024-03-01 DIAGNOSIS — J43.8 OTHER EMPHYSEMA (HCC): ICD-10-CM

## 2024-03-01 DIAGNOSIS — E78.5 HYPERLIPIDEMIA, UNSPECIFIED HYPERLIPIDEMIA TYPE: ICD-10-CM

## 2024-03-01 DIAGNOSIS — F33.40 RECURRENT MAJOR DEPRESSIVE DISORDER, IN REMISSION (HCC): ICD-10-CM

## 2024-03-01 ASSESSMENT — ENCOUNTER SYMPTOMS
ABDOMINAL PAIN: 0
BLOOD IN STOOL: 0
NAUSEA: 0
SHORTNESS OF BREATH: 1
COUGH: 0
CHEST TIGHTNESS: 0
ANAL BLEEDING: 0
WHEEZING: 0
CONSTIPATION: 0
DIARRHEA: 0
VOMITING: 0

## 2024-03-01 ASSESSMENT — PATIENT HEALTH QUESTIONNAIRE - PHQ9
SUM OF ALL RESPONSES TO PHQ QUESTIONS 1-9: 0
10. IF YOU CHECKED OFF ANY PROBLEMS, HOW DIFFICULT HAVE THESE PROBLEMS MADE IT FOR YOU TO DO YOUR WORK, TAKE CARE OF THINGS AT HOME, OR GET ALONG WITH OTHER PEOPLE: 0
7. TROUBLE CONCENTRATING ON THINGS, SUCH AS READING THE NEWSPAPER OR WATCHING TELEVISION: 0
2. FEELING DOWN, DEPRESSED OR HOPELESS: 0
SUM OF ALL RESPONSES TO PHQ9 QUESTIONS 1 & 2: 0
9. THOUGHTS THAT YOU WOULD BE BETTER OFF DEAD, OR OF HURTING YOURSELF: 0
SUM OF ALL RESPONSES TO PHQ QUESTIONS 1-9: 0
6. FEELING BAD ABOUT YOURSELF - OR THAT YOU ARE A FAILURE OR HAVE LET YOURSELF OR YOUR FAMILY DOWN: 0
5. POOR APPETITE OR OVEREATING: 0
8. MOVING OR SPEAKING SO SLOWLY THAT OTHER PEOPLE COULD HAVE NOTICED. OR THE OPPOSITE, BEING SO FIGETY OR RESTLESS THAT YOU HAVE BEEN MOVING AROUND A LOT MORE THAN USUAL: 0
4. FEELING TIRED OR HAVING LITTLE ENERGY: 0
3. TROUBLE FALLING OR STAYING ASLEEP: 0
1. LITTLE INTEREST OR PLEASURE IN DOING THINGS: 0

## 2024-03-01 NOTE — ASSESSMENT & PLAN NOTE
Likely some difficulty with activity tolerance is related to underlying pulmonary hypertension.  I stressed with patient at length the importance of reestablishing care with pulmonology and keeping regular cardiology follow-up visits to discuss any further management.  New pulmonology referral given again today in the office.

## 2024-03-01 NOTE — ASSESSMENT & PLAN NOTE
Blood pressure within normal limits today in the office.  Patient instructed to continue with current dose of olmesartan-hydrochlorothiazide

## 2024-03-01 NOTE — ASSESSMENT & PLAN NOTE
Stressed with patient the importance of obtaining DEXA scan to monitor for any potential need to intensify treatment.  Patient instructed to continue with calcium and vitamin D supplementation along with weightbearing activity.

## 2024-03-01 NOTE — ASSESSMENT & PLAN NOTE
Stable respiratory status with continued use of supplemental oxygen.  Patient maintaining normal SpO2 on supplemental oxygen.  Instructions given to continue with current Advair dosing and as needed use of albuterol MDI and DuoNeb nebulizer treatments

## 2024-03-01 NOTE — ASSESSMENT & PLAN NOTE
At risk for falls secondary to unsteady gait related to physical deconditioning, morbid obesity, and poor baseline respiratory status.  Patient is a good candidate for home PT/OT to help with balance, fall prevention, and lower extremity strengthening.  Referral to [facility] placed today in the office

## 2024-03-01 NOTE — PROGRESS NOTES
Marvin Vasquez (: 1940) is a 84 y.o. female, Established patient, who presents today for:    Chief Complaint   Patient presents with    Follow-up     Pt is having some difficulties with bathing alone now & is living with the daughter         ASSESSMENT/PLAN:    1. Essential hypertension  Assessment & Plan:  Blood pressure within normal limits today in the office.  Patient instructed to continue with current dose of olmesartan-hydrochlorothiazide   Orders:  -     CBC with Auto Differential; Future  -     Comprehensive Metabolic Panel; Future  -     TSH; Future  -     Ambulatory referral to Home Health  2. Pulmonary hypertension, moderate to severe (HCC)  Assessment & Plan:  Likely some difficulty with activity tolerance is related to underlying pulmonary hypertension.  I stressed with patient at length the importance of reestablishing care with pulmonology and keeping regular cardiology follow-up visits to discuss any further management.  New pulmonology referral given again today in the office.  Orders:  -     CBC with Auto Differential; Future  -     Ambulatory referral to Home Health  3. Hyperlipidemia, unspecified hyperlipidemia type  Assessment & Plan:  Most recent lipid panel stable.  Patient instructed to continue with current dose of atorvastatin.  Orders:  -     Comprehensive Metabolic Panel; Future  -     Lipid Panel; Future  -     Ambulatory referral to Home Health  4. Other emphysema (HCC)  Assessment & Plan:  Stable respiratory status with continued use of supplemental oxygen.  Patient maintaining normal SpO2 on supplemental oxygen.  Instructions given to continue with current Advair dosing and as needed use of albuterol MDI and DuoNeb nebulizer treatments   Orders:  -     CBC with Auto Differential; Future  -     Ambulatory referral to Home Health  -     Ambulatory referral to Pulmonology  5. Asthma with COPD (chronic obstructive pulmonary disease)  Assessment & Plan:  Stable respiratory status

## 2024-03-01 NOTE — ASSESSMENT & PLAN NOTE
Likely related to combination of chronic pulmonary hypertension and emphysema/asthma along with sedentary lifestyle and morbid obesity.  I stressed with patient the importance of making efforts to remain active during the day.  She is a candidate for home PT/OT to help with improving lower extremity strength.  Referral placed to [facility] today to have this initiated.

## 2024-03-01 NOTE — ASSESSMENT & PLAN NOTE
Stable respiratory status.  Patient instructed to continue with current dose of Advair and to use as needed inhaler and nebulizer for symptom relief.  We will continue to monitor over time.  I will have office staff help patient schedule overdue follow-up visit with pulmonology office.

## 2024-03-01 NOTE — ASSESSMENT & PLAN NOTE
Patient counseled on healthy dietary choices and the benefits of a lower salt and/or lower carbohydrate diet as appropriate. Patient also counseled on benefits of moderate intensity cardiovascular exercise for 150 minutes per week as they are able. Advice was given to make small changes over time, setting smaller achievable goals until recommended lifestyle changes are reached.

## 2024-03-04 ENCOUNTER — TELEPHONE (OUTPATIENT)
Dept: FAMILY MEDICINE CLINIC | Age: 84
End: 2024-03-04

## 2024-03-04 ENCOUNTER — HOSPITAL ENCOUNTER (OUTPATIENT)
Dept: LAB | Age: 84
Discharge: HOME | End: 2024-03-04
Payer: MEDICARE

## 2024-03-04 DIAGNOSIS — J44.89 ASTHMA WITH COPD (CHRONIC OBSTRUCTIVE PULMONARY DISEASE): Chronic | ICD-10-CM

## 2024-03-04 DIAGNOSIS — E78.5 HYPERLIPIDEMIA, UNSPECIFIED HYPERLIPIDEMIA TYPE: ICD-10-CM

## 2024-03-04 DIAGNOSIS — J43.8 OTHER EMPHYSEMA (HCC): ICD-10-CM

## 2024-03-04 DIAGNOSIS — F33.40 RECURRENT MAJOR DEPRESSIVE DISORDER, IN REMISSION (HCC): ICD-10-CM

## 2024-03-04 DIAGNOSIS — E66.01 MORBID OBESITY (HCC): ICD-10-CM

## 2024-03-04 DIAGNOSIS — R73.03 PRE-DIABETES: ICD-10-CM

## 2024-03-04 DIAGNOSIS — M85.89 OSTEOPENIA OF MULTIPLE SITES: ICD-10-CM

## 2024-03-04 DIAGNOSIS — I27.20 PULMONARY HYPERTENSION, MODERATE TO SEVERE (HCC): ICD-10-CM

## 2024-03-04 DIAGNOSIS — I10 ESSENTIAL HYPERTENSION: ICD-10-CM

## 2024-03-04 LAB
ALBUMIN SERPL-MCNC: 4 G/DL (ref 3.5–4.6)
ALP SERPL-CCNC: 57 U/L (ref 40–130)
ALT SERPL-CCNC: 23 U/L (ref 0–33)
ANION GAP SERPL CALCULATED.3IONS-SCNC: 11 MEQ/L (ref 9–15)
ANISOCYTOSIS BLD QL SMEAR: ABNORMAL
AST SERPL-CCNC: 23 U/L (ref 0–35)
BASOPHILS # BLD: 0 K/UL (ref 0–0.2)
BASOPHILS NFR BLD: 0.5 %
BILIRUB SERPL-MCNC: 0.3 MG/DL (ref 0.2–0.7)
BUN SERPL-MCNC: 23 MG/DL (ref 8–23)
CALCIUM SERPL-MCNC: 9.2 MG/DL (ref 8.5–9.9)
CHLORIDE SERPL-SCNC: 101 MEQ/L (ref 95–107)
CHOLEST SERPL-MCNC: 124 MG/DL (ref 0–199)
CO2 SERPL-SCNC: 33 MEQ/L (ref 20–31)
CREAT SERPL-MCNC: 0.89 MG/DL (ref 0.5–0.9)
EOSINOPHIL # BLD: 0.2 K/UL (ref 0–0.7)
EOSINOPHIL NFR BLD: 3 %
ERYTHROCYTE [DISTWIDTH] IN BLOOD BY AUTOMATED COUNT: 14 % (ref 11.5–14.5)
GLOBULIN SER CALC-MCNC: 2.9 G/DL (ref 2.3–3.5)
GLUCOSE SERPL-MCNC: 137 MG/DL (ref 70–99)
HBA1C MFR BLD: 5.7 % (ref 4.8–5.9)
HCT VFR BLD AUTO: 35.4 % (ref 37–47)
HDLC SERPL-MCNC: 41 MG/DL (ref 40–59)
HGB BLD-MCNC: 10.3 G/DL (ref 12–16)
HYPOCHROMIA BLD QL SMEAR: ABNORMAL
LDLC SERPL CALC-MCNC: 61 MG/DL (ref 0–129)
LYMPHOCYTES # BLD: 1.9 K/UL (ref 1–4.8)
LYMPHOCYTES NFR BLD: 23.9 %
MCH RBC QN AUTO: 29 PG (ref 27–31.3)
MCHC RBC AUTO-ENTMCNC: 29.1 % (ref 33–37)
MCV RBC AUTO: 99.7 FL (ref 79.4–94.8)
MONOCYTES # BLD: 0.6 K/UL (ref 0.2–0.8)
MONOCYTES NFR BLD: 6.9 %
NEUTROPHILS # BLD: 5.3 K/UL (ref 1.4–6.5)
NEUTS SEG NFR BLD: 65.2 %
PLATELET # BLD AUTO: 199 K/UL (ref 130–400)
PLATELET BLD QL SMEAR: ADEQUATE
POTASSIUM SERPL-SCNC: 3.6 MEQ/L (ref 3.4–4.9)
PROT SERPL-MCNC: 6.9 G/DL (ref 6.3–8)
RBC # BLD AUTO: 3.55 M/UL (ref 4.2–5.4)
SLIDE REVIEW: ABNORMAL
SODIUM SERPL-SCNC: 145 MEQ/L (ref 135–144)
TRIGL SERPL-MCNC: 112 MG/DL (ref 0–150)
TSH SERPL-MCNC: 4.1 UIU/ML (ref 0.44–3.86)
VITAMIN D 25-HYDROXY: 13.5 NG/ML
WBC # BLD AUTO: 8.1 K/UL (ref 4.8–10.8)

## 2024-03-04 PROCEDURE — 83036 HEMOGLOBIN GLYCOSYLATED A1C: CPT

## 2024-03-04 PROCEDURE — 84443 ASSAY THYROID STIM HORMONE: CPT

## 2024-03-04 PROCEDURE — 80053 COMPREHEN METABOLIC PANEL: CPT

## 2024-03-04 PROCEDURE — 80061 LIPID PANEL: CPT

## 2024-03-04 PROCEDURE — 85025 COMPLETE CBC W/AUTO DIFF WBC: CPT

## 2024-03-04 PROCEDURE — 36415 COLL VENOUS BLD VENIPUNCTURE: CPT

## 2024-03-04 PROCEDURE — 82306 VITAMIN D 25 HYDROXY: CPT

## 2024-03-04 NOTE — TELEPHONE ENCOUNTER
[facility] ( ok to [facility])   requesting a return call for a verbal for home care to start on March 7,

## 2024-03-08 DIAGNOSIS — I10 ESSENTIAL HYPERTENSION: ICD-10-CM

## 2024-03-08 DIAGNOSIS — R79.89 ELEVATED TSH: ICD-10-CM

## 2024-03-08 DIAGNOSIS — D53.9 MACROCYTIC ANEMIA: Primary | ICD-10-CM

## 2024-03-08 NOTE — RESULT ENCOUNTER NOTE
Please notify patient and family that recent labwork shows the followin.  Blood counts show anemia slightly lower than previous baseline.  I would like to repeat testing in 1 month to reassess.  Future order left in chart  2.  Baseline thyroid function testing is mildly abnormal.  I would like to reassess with further testing in 1 month to determine if her thyroid function is underactive.  Future orders left in chart  3.  Her vitamin D level is very low.  Low vitamin D levels will contribute to fatigue, worsening mood, decreased bone density leading to fracture, and insulin resistance.  I would like for her to start a daily 5000 IU vitamin D3 supplement and we will repeat her vitamin D level at her next routine office visit to reassess.     Please advise patient that any other lab results not specifically mentioned in message above are either normal, stable compared to previous results, not clinically significant, or would not change the current treatment plan.

## 2024-04-17 DIAGNOSIS — G25.81 RESTLESS LEG SYNDROME: ICD-10-CM

## 2024-04-18 RX ORDER — ROPINIROLE 1 MG/1
1 TABLET, FILM COATED ORAL 3 TIMES DAILY
Qty: 270 TABLET | Refills: 1 | Status: SHIPPED | OUTPATIENT
Start: 2024-04-18

## 2024-04-18 NOTE — TELEPHONE ENCOUNTER
Comments:     Last Office Visit (last PCP visit):   3/1/2024    Next Visit Date:  Future Appointments   Date Time Provider Department Center   9/6/2024 12:30 PM Andres Aranda MD [facility]       **If hasn't been seen in over a year OR hasn't followed up according to last diabetes/ADHD visit, make appointment for patient before sending refill to provider.    Rx requested:  Requested Prescriptions     Pending Prescriptions Disp Refills    rOPINIRole (REQUIP) 1 MG tablet 90 tablet 0     Sig: Take 1 tablet by mouth 3 times daily

## 2024-06-18 DIAGNOSIS — F41.1 GENERALIZED ANXIETY DISORDER: ICD-10-CM

## 2024-06-18 NOTE — TELEPHONE ENCOUNTER
Comments:    Last Office Visit (last PCP visit):   3/1/2024    Next Visit Date:  Future Appointments   Date Time Provider Department Center   9/6/2024 12:30 PM Andres Aranda MD [facility]       **If hasn't been seen in over a year OR hasn't followed up according to last diabetes/ADHD visit, make appointment for patient before sending refill to provider.    Rx requested:  Requested Prescriptions     Pending Prescriptions Disp Refills    sertraline (ZOLOFT) 50 MG tablet [Pharmacy Med Name: Sertraline HCl 50 MG Oral Tablet] 90 tablet 0     Sig: Take 1 tablet by mouth once daily

## 2024-06-20 DIAGNOSIS — E78.5 DYSLIPIDEMIA: ICD-10-CM

## 2024-06-20 RX ORDER — ATORVASTATIN CALCIUM 40 MG/1
40 TABLET, FILM COATED ORAL DAILY
Qty: 90 TABLET | Refills: 1 | Status: SHIPPED | OUTPATIENT
Start: 2024-06-20

## 2024-06-20 NOTE — TELEPHONE ENCOUNTER
Comments:     Last Office Visit (last PCP visit):   3/1/2024    Next Visit Date:  Future Appointments   Date Time Provider Department Center   9/6/2024 12:30 PM Andres Aranda MD [facility]       **If hasn't been seen in over a year OR hasn't followed up according to last diabetes/ADHD visit, make appointment for patient before sending refill to provider.    Rx requested:  Requested Prescriptions     Pending Prescriptions Disp Refills    atorvastatin (LIPITOR) 40 MG tablet [Pharmacy Med Name: Atorvastatin Calcium 40 MG Oral Tablet] 90 tablet 0     Sig: Take 1 tablet by mouth once daily

## 2024-09-10 DIAGNOSIS — I10 ESSENTIAL HYPERTENSION: ICD-10-CM

## 2024-09-10 RX ORDER — OLMESARTAN MEDOXOMIL AND HYDROCHLOROTHIAZIDE 40/25 40; 25 MG/1; MG/1
1 TABLET ORAL DAILY
Qty: 90 TABLET | Refills: 1 | Status: SHIPPED | OUTPATIENT
Start: 2024-09-10

## 2024-11-04 ENCOUNTER — OFFICE VISIT (OUTPATIENT)
Dept: FAMILY MEDICINE CLINIC | Age: 84
End: 2024-11-04

## 2024-11-04 VITALS
HEIGHT: 59 IN | TEMPERATURE: 98.6 F | BODY MASS INDEX: 40.32 KG/M2 | SYSTOLIC BLOOD PRESSURE: 136 MMHG | HEART RATE: 93 BPM | DIASTOLIC BLOOD PRESSURE: 72 MMHG | WEIGHT: 200 LBS | OXYGEN SATURATION: 95 %

## 2024-11-04 DIAGNOSIS — E78.5 HYPERLIPIDEMIA, UNSPECIFIED HYPERLIPIDEMIA TYPE: ICD-10-CM

## 2024-11-04 DIAGNOSIS — M85.89 OSTEOPENIA OF MULTIPLE SITES: ICD-10-CM

## 2024-11-04 DIAGNOSIS — D53.9 MACROCYTIC ANEMIA: ICD-10-CM

## 2024-11-04 DIAGNOSIS — R73.03 PRE-DIABETES: ICD-10-CM

## 2024-11-04 DIAGNOSIS — E66.01 MORBID OBESITY: ICD-10-CM

## 2024-11-04 DIAGNOSIS — R79.89 ELEVATED TSH: ICD-10-CM

## 2024-11-04 DIAGNOSIS — Z00.00 MEDICARE ANNUAL WELLNESS VISIT, SUBSEQUENT: Primary | ICD-10-CM

## 2024-11-04 DIAGNOSIS — Z23 NEED FOR VACCINATION: ICD-10-CM

## 2024-11-04 DIAGNOSIS — I27.20 PULMONARY HYPERTENSION, MODERATE TO SEVERE (HCC): ICD-10-CM

## 2024-11-04 DIAGNOSIS — I10 ESSENTIAL HYPERTENSION: ICD-10-CM

## 2024-11-04 DIAGNOSIS — J44.89 ASTHMA WITH COPD (CHRONIC OBSTRUCTIVE PULMONARY DISEASE) (HCC): Chronic | ICD-10-CM

## 2024-11-04 SDOH — ECONOMIC STABILITY: FOOD INSECURITY: WITHIN THE PAST 12 MONTHS, YOU WORRIED THAT YOUR FOOD WOULD RUN OUT BEFORE YOU GOT MONEY TO BUY MORE.: NEVER TRUE

## 2024-11-04 SDOH — ECONOMIC STABILITY: INCOME INSECURITY: HOW HARD IS IT FOR YOU TO PAY FOR THE VERY BASICS LIKE FOOD, HOUSING, MEDICAL CARE, AND HEATING?: NOT HARD AT ALL

## 2024-11-04 SDOH — ECONOMIC STABILITY: FOOD INSECURITY: WITHIN THE PAST 12 MONTHS, THE FOOD YOU BOUGHT JUST DIDN'T LAST AND YOU DIDN'T HAVE MONEY TO GET MORE.: NEVER TRUE

## 2024-11-04 ASSESSMENT — PATIENT HEALTH QUESTIONNAIRE - PHQ9
9. THOUGHTS THAT YOU WOULD BE BETTER OFF DEAD, OR OF HURTING YOURSELF: NOT AT ALL
SUM OF ALL RESPONSES TO PHQ QUESTIONS 1-9: 0
3. TROUBLE FALLING OR STAYING ASLEEP: NOT AT ALL
6. FEELING BAD ABOUT YOURSELF - OR THAT YOU ARE A FAILURE OR HAVE LET YOURSELF OR YOUR FAMILY DOWN: NOT AT ALL
SUM OF ALL RESPONSES TO PHQ QUESTIONS 1-9: 0
SUM OF ALL RESPONSES TO PHQ QUESTIONS 1-9: 0
2. FEELING DOWN, DEPRESSED OR HOPELESS: NOT AT ALL
10. IF YOU CHECKED OFF ANY PROBLEMS, HOW DIFFICULT HAVE THESE PROBLEMS MADE IT FOR YOU TO DO YOUR WORK, TAKE CARE OF THINGS AT HOME, OR GET ALONG WITH OTHER PEOPLE: NOT DIFFICULT AT ALL
4. FEELING TIRED OR HAVING LITTLE ENERGY: NOT AT ALL
8. MOVING OR SPEAKING SO SLOWLY THAT OTHER PEOPLE COULD HAVE NOTICED. OR THE OPPOSITE, BEING SO FIGETY OR RESTLESS THAT YOU HAVE BEEN MOVING AROUND A LOT MORE THAN USUAL: NOT AT ALL
1. LITTLE INTEREST OR PLEASURE IN DOING THINGS: NOT AT ALL
7. TROUBLE CONCENTRATING ON THINGS, SUCH AS READING THE NEWSPAPER OR WATCHING TELEVISION: NOT AT ALL
SUM OF ALL RESPONSES TO PHQ QUESTIONS 1-9: 0
SUM OF ALL RESPONSES TO PHQ9 QUESTIONS 1 & 2: 0
5. POOR APPETITE OR OVEREATING: NOT AT ALL

## 2024-11-04 NOTE — PATIENT INSTRUCTIONS
cholesterol and LDL cholesterol   Omega-3 fatty acids  particularly those found in fatty fish (such as salmon, trout, tuna, mackerel, herring, and sardines); can decrease risk of arrhythmias, decrease triglyceride levels, and slightly lower blood pressure   The fats that you want to limit are:   Saturated fat  found in animal products, many fast foods, and a few vegetables; increases total blood cholesterol, including LDL levels   Animal fats that are saturated include: butter, lard, whole-milk dairy products, meat fat, and poultry skin   Vegetable fats that are saturated include: hydrogenated shortening, palm oil, coconut oil, cocoa butter   Hydrogenated or trans fat  found in margarine and vegetable shortening, most shelf stable snack foods, and fried foods; increases LDL and decreases HDL     It is generally recommended that you limit your total fat for the day to less than 30% of your total calories. If you follow an 1800-calorie heart healthy diet, for example, this would mean 60 grams of fat or less per day.   Saturated fat and trans fat in your diet raises your blood cholesterol the most, much more than dietary cholesterol does. For this reason, on a heart-healthy diet, less than 7% of your calories should come from saturated fat and ideally 0% from trans fat. On an 1800-calorie diet, this translates into less than 14 grams of saturated fat per day, leaving 46 grams of fat to come from mono- and polyunsaturated fats.   Food Choices on a Heart Healthy Diet   Food Category   Foods Recommended   Foods to Avoid   Grains   Breads and rolls without salted tops Most dry and cooked cereals Unsalted crackers and breadsticks Low-sodium or homemade breadcrumbs or stuffing All rice and pastas   Breads, rolls, and crackers with salted tops High-fat baked goods (eg, muffins, donuts, pastries) Quick breads, self-rising flour, and biscuit mixes Regular bread crumbs Instant hot cereals Commercially prepared rice, pasta, or

## 2024-11-04 NOTE — PROGRESS NOTES
Taking? Authorizing Provider   olmesartan-hydroCHLOROthiazide (BENICAR HCT) 40-25 MG per tablet Take 1 tablet by mouth daily Yes Andres Aranda MD   atorvastatin (LIPITOR) 40 MG tablet Take 1 tablet by mouth daily Yes Andres Aranda MD   sertraline (ZOLOFT) 50 MG tablet Take 1 tablet by mouth daily Yes Andres Aranda MD   rOPINIRole (REQUIP) 1 MG tablet Take 1 tablet by mouth 3 times daily Yes Andres Aranda MD   albuterol sulfate HFA (VENTOLIN HFA) 108 (90 Base) MCG/ACT inhaler Inhale 2 puffs into the lungs every 6 hours as needed for Wheezing Yes Andres Aranda MD   fluticasone-salmeterol (ADVAIR DISKUS) 250-50 MCG/ACT AEPB diskus inhaler Inhale 1 puff into the lungs in the morning and 1 puff in the evening. Yes Andres Aranda MD   ipratropium 0.5 mg-albuterol 2.5 mg (DUONEB) 0.5-2.5 (3) MG/3ML SOLN nebulizer solution Inhale 3 mLs into the lungs every 6 hours as needed for Shortness of Breath Yes Andres Aranda MD   fluticasone (FLONASE) 50 MCG/ACT nasal spray 1 spray by Nasal route daily Yes Andres Aranda MD   potassium chloride (KLOR-CON M) 10 MEQ extended release tablet Take 1 tablet by mouth daily Yes Pamella Castellon APRN - CNP   baclofen (LIORESAL) 20 MG tablet Take 1 tablet by mouth 2 times daily Yes Pamella Castellon APRN - CNP   fexofenadine (ALLEGRA) 180 MG tablet Take 1 tablet by mouth daily Yes Pamella Castellon APRN - CNP   Blood Pressure Monitoring (SPHYGMOMANOMETER) MISC 1 Device by Does not apply route daily Yes Pamella Castellon APRN - CNP   polyethyl glycol-propyl glycol 0.4-0.3 % (SYSTANE) 0.4-0.3 % ophthalmic solution 1 drop as needed for Dry Eyes Yes Provider, MD Kieran Pierre (Including outside providers/suppliers regularly involved in providing care):   Patient Care Team:  Andres Aranda MD as PCP - General (Family Medicine)  Andres Aranda MD as PCP - Empaneled Provider  Jozef Roberson MD as Surgeon (Otolaryngology)  Jese Coppola

## 2024-11-18 ENCOUNTER — TELEMEDICINE (OUTPATIENT)
Dept: FAMILY MEDICINE CLINIC | Age: 84
End: 2024-11-18

## 2024-11-18 DIAGNOSIS — J44.89 ASTHMA WITH COPD (CHRONIC OBSTRUCTIVE PULMONARY DISEASE) (HCC): Chronic | ICD-10-CM

## 2024-11-18 DIAGNOSIS — J20.9 ACUTE BRONCHITIS, UNSPECIFIED ORGANISM: Primary | ICD-10-CM

## 2024-11-18 DIAGNOSIS — I10 ESSENTIAL HYPERTENSION: ICD-10-CM

## 2024-11-18 DIAGNOSIS — R73.03 PRE-DIABETES: ICD-10-CM

## 2024-11-18 DIAGNOSIS — J96.11 CHRONIC RESPIRATORY FAILURE WITH HYPOXIA: ICD-10-CM

## 2024-11-18 DIAGNOSIS — J43.8 OTHER EMPHYSEMA (HCC): ICD-10-CM

## 2024-11-18 DIAGNOSIS — F33.1 MODERATE EPISODE OF RECURRENT MAJOR DEPRESSIVE DISORDER (HCC): ICD-10-CM

## 2024-11-18 DIAGNOSIS — E78.2 MIXED HYPERLIPIDEMIA: ICD-10-CM

## 2024-11-18 DIAGNOSIS — R26.81 UNSTEADY GAIT: ICD-10-CM

## 2024-11-18 PROBLEM — F32.9 MAJOR DEPRESSION: Status: RESOLVED | Noted: 2021-06-07 | Resolved: 2024-11-18

## 2024-11-18 PROBLEM — H04.123 DRY EYE SYNDROME, BILATERAL: Status: RESOLVED | Noted: 2017-09-01 | Resolved: 2024-11-18

## 2024-11-18 PROBLEM — J34.2 DEVIATED SEPTUM: Status: RESOLVED | Noted: 2017-12-29 | Resolved: 2024-11-18

## 2024-11-18 PROBLEM — J34.3 HYPERTROPHY OF NASAL TURBINATES: Status: RESOLVED | Noted: 2017-12-29 | Resolved: 2024-11-18

## 2024-11-18 PROBLEM — J32.0 CHRONIC MAXILLARY SINUSITIS: Status: RESOLVED | Noted: 2017-12-29 | Resolved: 2024-11-18

## 2024-11-18 PROBLEM — R09.02 HYPOXIA: Status: RESOLVED | Noted: 2018-08-23 | Resolved: 2024-11-18

## 2024-11-18 RX ORDER — AZITHROMYCIN 500 MG/1
500 TABLET, FILM COATED ORAL DAILY
Qty: 7 TABLET | Refills: 0 | Status: SHIPPED | OUTPATIENT
Start: 2024-11-18 | End: 2024-11-25

## 2024-11-18 RX ORDER — METHYLPREDNISOLONE 4 MG/1
TABLET ORAL
Qty: 1 KIT | Refills: 0 | Status: SHIPPED | OUTPATIENT
Start: 2024-11-18

## 2024-11-18 ASSESSMENT — ENCOUNTER SYMPTOMS
SHORTNESS OF BREATH: 0
BLOOD IN STOOL: 0
WHEEZING: 0
COUGH: 1
NAUSEA: 0
CHEST TIGHTNESS: 0
DIARRHEA: 0
VOICE CHANGE: 0
ABDOMINAL PAIN: 0
CONSTIPATION: 0
SINUS PAIN: 0

## 2024-11-18 NOTE — PROGRESS NOTES
[facility]  [address] [address]  Dept: [phone]  Dept Fax: [phone]  Loc: [phone]     11/18/2024    Visit type: Acute care    Reason for Visit: Cough (Patient and daughter report productive cough, chest/nasal congestion, feeling feverish x1 week. )     Marvin Vasquez, was evaluated through a synchronous (real-time) audio-video encounter. The patient (or guardian if applicable) is aware that this is a billable service, which includes applicable co-pays. This Virtual Visit was conducted with patient's (and/or legal guardian's) consent. Patient identification was verified, and a caregiver was present when appropriate.   The patient was located at Home: [address] [place] [id number]  Provider was located at Facility (Appt Dept): [address] [address]  Confirm you are appropriately licensed, registered, or certified to deliver care in the [facility] where the patient is located as indicated above. If you are not or unsure, please re-schedule the visit: Yes, I confirm.      Total time spent for this encounter: Not billed by time    --Mega El MD on 11/18/2024 at 11:29 AM    An electronic signature was used to authenticate this note.    ASSESSMENT/PLAN   1. Acute bronchitis, unspecified organism  Assessment & Plan:   Since this is a televisit I could not examine the patient she is not visibly short of breath she tells me she is not short of breath but the cough she is on 3 L home O2 her pulse ox machine is not working she is staying with her daughter on the chair up not lethargic I put her on antibiotics she has an inhaler to take and prednisone ordered chest x-ray strongly advised to get the chest x-ray done if anything getting worse strongly advised her to take her to the emergency room immediately strongly advised to check her pulse ox with a new machine if pulse ox drops on 3 L

## 2024-11-18 NOTE — ASSESSMENT & PLAN NOTE
Since this is a televisit I could not examine the patient she is not visibly short of breath she tells me she is not short of breath but the cough she is on 3 L home O2 her pulse ox machine is not working she is staying with her daughter on the chair up not lethargic I put her on antibiotics she has an inhaler to take and prednisone ordered chest x-ray strongly advised to get the chest x-ray done if anything getting worse strongly advised her to take her to the emergency room immediately strongly advised to check her pulse ox with a new machine if pulse ox drops on 3 L nasal cannula she has to go back to the ER to get checked daughter and the patient understood    Advised to follow-up with me in 1 week

## 2024-11-18 NOTE — ASSESSMENT & PLAN NOTE
Read the labels of the foods you buy from the market to find out how much fat is present. Under 5% of total fat on a label means it is \"low fat\". Over 20% of total fat on a label means it is \"high fat\".  Of total calories that you eat you should only be obtaining 10% or less of these calories from saturated fats.    Eat high fiber foods including green leafy vegetables, fruits, legum's and  whole grain breads. .This type of fiber helps lower cholesterol in the body. Choose oatmeal, fruits (like apples), beans, and nuts to get the most soluble fiber.    Stay away from butter, stick margarine, shortening, lard, palm and coconut oil.  Choose plant-based spreads instead.    Do not eat high-fat processed foods like hot dogs, ascencio, sausage, ham and other luncheon meats high in fat, and some frozen foods.  Routinely choose fish, chicken, turkey, and lean meats instead.    As a rule of thumb stay away from deep fried foods.

## 2025-03-04 DIAGNOSIS — E78.5 DYSLIPIDEMIA: ICD-10-CM

## 2025-03-04 RX ORDER — ATORVASTATIN CALCIUM 40 MG/1
40 TABLET, FILM COATED ORAL DAILY
Qty: 90 TABLET | Refills: 1 | Status: SHIPPED | OUTPATIENT
Start: 2025-03-04

## 2025-03-04 NOTE — TELEPHONE ENCOUNTER
Pharmacy is requesting medication refill. Please approve or deny this request.    Rx requested:  Requested Prescriptions     Pending Prescriptions Disp Refills    atorvastatin (LIPITOR) 40 MG tablet 90 tablet 1     Sig: Take 1 tablet by mouth daily         Last Office Visit:   11/18/2024      Next Visit Date:  Future Appointments   Date Time Provider Department Center   5/6/2025  3:00 PM Mega El MD [facility] DEP

## 2025-06-09 DIAGNOSIS — F41.1 GENERALIZED ANXIETY DISORDER: ICD-10-CM

## 2025-06-09 NOTE — TELEPHONE ENCOUNTER
Pharmacy is requesting medication refill for patient. Please approve or deny this request.    Rx requested:  Requested Prescriptions     Pending Prescriptions Disp Refills    sertraline (ZOLOFT) 50 MG tablet 90 tablet 1     Sig: Take 1 tablet by mouth daily         Last Office Visit:   11/18/2024      Next Visit Date:  No future appointments.

## 2025-06-13 DIAGNOSIS — I10 ESSENTIAL HYPERTENSION: ICD-10-CM

## 2025-06-13 RX ORDER — OLMESARTAN MEDOXOMIL AND HYDROCHLOROTHIAZIDE 40/25 40; 25 MG/1; MG/1
1 TABLET ORAL DAILY
Qty: 90 TABLET | Refills: 1 | Status: SHIPPED | OUTPATIENT
Start: 2025-06-13

## 2025-06-13 NOTE — TELEPHONE ENCOUNTER
[facility] requesting refill for patient   olmesartan-hydroCHLOROthiazide (BENICAR HCT) 40-25 MG per tablet       LOV 11/8/24

## 2025-06-13 NOTE — TELEPHONE ENCOUNTER
[facility] in [place] requesting refill for olmesartan-hydroCHLOROthiazide (BENICAR HCT) 40-25 MG per tablet. Please advise.    LOV: 11/18/2024  No future appts    [facility] Ph: [phone]

## 2025-06-16 DIAGNOSIS — F41.1 GENERALIZED ANXIETY DISORDER: ICD-10-CM

## 2025-06-16 NOTE — TELEPHONE ENCOUNTER
Pt stated  medication was picked  up a few days ago but she can not find it she looked everywhere in the house and the car she was in ..Pt asking if provider can resend the prescription

## 2025-06-16 NOTE — TELEPHONE ENCOUNTER
Pt requesting a refill sertraline (ZOLOFT) 50 MG tablet .    Patient not able to make an appointment at this time stated daughter is in [place] for 2months and has no ride.     LOV 11/18/2024

## (undated) DEVICE — INTENDED FOR TISSUE SEPARATION, AND OTHER PROCEDURES THAT REQUIRE A SHARP SURGICAL BLADE TO PUNCTURE OR CUT.: Brand: BARD-PARKER ® CARBON RIB-BACK BLADES

## (undated) DEVICE — GLOVE ORANGE PI 7 1/2   MSG9075

## (undated) DEVICE — GAUZE SPONGES,12 PLY: Brand: CURITY

## (undated) DEVICE — 1842 FOAM BLOCK NEEDLE COUNTER: Brand: DEVON

## (undated) DEVICE — ELECTRODE NDL L6.5IN S STL VERSATILE REUSE

## (undated) DEVICE — 2000CC GUARDIAN II: Brand: GUARDIAN

## (undated) DEVICE — GLOVE RAD SZ 8 L11.85IN THK9MIL IVRY POLYCHLOROPRENE SMOOTH

## (undated) DEVICE — SYSTEM ILLUMINATION L100CM SNUS RELIEVA LUMA SENTRY

## (undated) DEVICE — COVER EQUIP L32XW30IN FORXRAY TUBE OEC 8800 9600 9800 C ARM

## (undated) DEVICE — SUTURE CHROMIC GUT SZ 4-0 L18IN ABSRB BRN L13MM P-3 3/8 CIR 1654G

## (undated) DEVICE — Z CONVERTED USE 2271043 CONTAINER SPEC COLL 4OZ SCR ON LID PEEL PCH

## (undated) DEVICE — DBD-PACK,EENT,SIRUS,PK II: Brand: MEDLINE

## (undated) DEVICE — X-RAY DETECTABLE SPONGES,16 PLY: Brand: VISTEC

## (undated) DEVICE — NEEDLE HYPO 25GA L1.5IN BLU POLYPR HUB S STL REG BVL STR

## (undated) DEVICE — CATHETER ENDOSCP M-110 TIP SNUS GUID RELIEVA FLX

## (undated) DEVICE — LABEL MED MINI W/ MARKER

## (undated) DEVICE — BLADE 1882040HR 5PK M4 INF TURB 2MM ROT: Brand: STRAIGHTSHOT

## (undated) DEVICE — DEVICE INFL PRSS G INDIC DISP (MUST BE PURC IN MULTIPLES OF 5)

## (undated) DEVICE — TOWEL,OR,DSP,ST,BLUE,STD,4/PK,20PK/CS: Brand: MEDLINE

## (undated) DEVICE — SYRINGE MED 10ML TRNSLUC BRL PLUNG BLK MRK POLYPR CTRL

## (undated) DEVICE — GAUZE,SPONGE,2"X2",8PLY,STERILE,LF,2'S: Brand: MEDLINE

## (undated) DEVICE — CATHETER SNUS BLLN L16MM DIA6MM HI PERF DIL INT SNUS IRRIG

## (undated) DEVICE — ELECTRODE PT RET AD L9FT HI MOIST COND ADH HYDRGEL CORDED

## (undated) DEVICE — CODMAN® SURGICAL PATTIES 1/2" X 3" (1.27CM X 7.62CM): Brand: CODMAN®

## (undated) DEVICE — PAD N ADH W3XL4IN POLY COT SFT PERF FLM EASILY CUT ABSRB

## (undated) DEVICE — MEDI-VAC NON-CONDUCTIVE SUCTION TUBING: Brand: CARDINAL HEALTH

## (undated) DEVICE — SUTURE PERMAHAND SZ 2-0 L30IN NONABSORBABLE BLK L60MM KS 623H

## (undated) DEVICE — DEFOGGER!" ANTI FOG KIT: Brand: DEROYAL

## (undated) DEVICE — PENCIL ES L3M BTTN SWCH HOLSTER W/ BLDE ELECTRD EDGE

## (undated) DEVICE — Device

## (undated) DEVICE — Z CONVERTED USE 2271045 CONTAINER SPEC 4OZ W/ SCR LID